# Patient Record
Sex: MALE | Race: WHITE | HISPANIC OR LATINO | Employment: FULL TIME | URBAN - METROPOLITAN AREA
[De-identification: names, ages, dates, MRNs, and addresses within clinical notes are randomized per-mention and may not be internally consistent; named-entity substitution may affect disease eponyms.]

---

## 2019-12-26 ENCOUNTER — APPOINTMENT (EMERGENCY)
Dept: NON INVASIVE DIAGNOSTICS | Facility: HOSPITAL | Age: 54
End: 2019-12-26
Payer: COMMERCIAL

## 2019-12-26 ENCOUNTER — HOSPITAL ENCOUNTER (EMERGENCY)
Facility: HOSPITAL | Age: 54
Discharge: HOME | End: 2019-12-26
Attending: EMERGENCY MEDICINE | Admitting: EMERGENCY MEDICINE
Payer: COMMERCIAL

## 2019-12-26 ENCOUNTER — APPOINTMENT (EMERGENCY)
Dept: RADIOLOGY | Facility: HOSPITAL | Age: 54
End: 2019-12-26
Payer: COMMERCIAL

## 2019-12-26 VITALS
OXYGEN SATURATION: 98 % | RESPIRATION RATE: 19 BRPM | WEIGHT: 179.3 LBS | SYSTOLIC BLOOD PRESSURE: 171 MMHG | TEMPERATURE: 97.6 F | HEART RATE: 92 BPM | DIASTOLIC BLOOD PRESSURE: 96 MMHG

## 2019-12-26 DIAGNOSIS — R07.89 ATYPICAL CHEST PAIN: ICD-10-CM

## 2019-12-26 DIAGNOSIS — M79.605 LEFT LEG PAIN: Primary | ICD-10-CM

## 2019-12-26 LAB
ANION GAP SERPL CALCULATED.3IONS-SCNC: 9 MMOL/L (ref 5–14)
ATRIAL RATE: 81 BPM
BASOPHILS # BLD AUTO: 0.1 THOUSANDS/ΜL (ref 0–0.1)
BASOPHILS NFR BLD AUTO: 1 % (ref 0–1)
BUN SERPL-MCNC: 24 MG/DL (ref 5–25)
CALCIUM SERPL-MCNC: 9 MG/DL (ref 8.4–10.2)
CHLORIDE SERPL-SCNC: 101 MMOL/L (ref 97–108)
CO2 SERPL-SCNC: 29 MMOL/L (ref 22–30)
CREAT SERPL-MCNC: 1.16 MG/DL (ref 0.7–1.5)
EOSINOPHIL # BLD AUTO: 0.1 THOUSAND/ΜL (ref 0–0.4)
EOSINOPHIL NFR BLD AUTO: 1 % (ref 0–6)
ERYTHROCYTE [DISTWIDTH] IN BLOOD BY AUTOMATED COUNT: 14.8 %
GFR SERPL CREATININE-BSD FRML MDRD: 70 ML/MIN/1.73SQ M
GLUCOSE SERPL-MCNC: 103 MG/DL (ref 70–99)
HCT VFR BLD AUTO: 42.4 % (ref 41–53)
HGB BLD-MCNC: 14.1 G/DL (ref 13.5–17.5)
LYMPHOCYTES # BLD AUTO: 1.5 THOUSANDS/ΜL (ref 0.5–4)
LYMPHOCYTES NFR BLD AUTO: 15 % (ref 25–45)
MCH RBC QN AUTO: 29.1 PG (ref 26–34)
MCHC RBC AUTO-ENTMCNC: 33.2 G/DL (ref 31–36)
MCV RBC AUTO: 88 FL (ref 80–100)
MONOCYTES # BLD AUTO: 0.9 THOUSAND/ΜL (ref 0.2–0.9)
MONOCYTES NFR BLD AUTO: 9 % (ref 1–10)
NEUTROPHILS # BLD AUTO: 7.5 THOUSANDS/ΜL (ref 1.8–7.8)
NEUTS SEG NFR BLD AUTO: 75 % (ref 45–65)
P AXIS: 57 DEGREES
PLATELET # BLD AUTO: 253 THOUSANDS/UL (ref 150–450)
PMV BLD AUTO: 9.3 FL (ref 8.9–12.7)
POTASSIUM SERPL-SCNC: 3.9 MMOL/L (ref 3.6–5)
PR INTERVAL: 146 MS
QRS AXIS: 41 DEGREES
QRSD INTERVAL: 80 MS
QT INTERVAL: 370 MS
QTC INTERVAL: 429 MS
RBC # BLD AUTO: 4.83 MILLION/UL (ref 4.5–5.9)
SODIUM SERPL-SCNC: 139 MMOL/L (ref 137–147)
T WAVE AXIS: 56 DEGREES
TROPONIN I SERPL-MCNC: <0.01 NG/ML (ref 0–0.03)
VENTRICULAR RATE: 81 BPM
WBC # BLD AUTO: 10 THOUSAND/UL (ref 4.5–11)

## 2019-12-26 PROCEDURE — 93971 EXTREMITY STUDY: CPT

## 2019-12-26 PROCEDURE — 99284 EMERGENCY DEPT VISIT MOD MDM: CPT

## 2019-12-26 PROCEDURE — 73590 X-RAY EXAM OF LOWER LEG: CPT

## 2019-12-26 PROCEDURE — 36415 COLL VENOUS BLD VENIPUNCTURE: CPT | Performed by: EMERGENCY MEDICINE

## 2019-12-26 PROCEDURE — 84484 ASSAY OF TROPONIN QUANT: CPT | Performed by: EMERGENCY MEDICINE

## 2019-12-26 PROCEDURE — 71046 X-RAY EXAM CHEST 2 VIEWS: CPT

## 2019-12-26 PROCEDURE — 93005 ELECTROCARDIOGRAM TRACING: CPT

## 2019-12-26 PROCEDURE — 80048 BASIC METABOLIC PNL TOTAL CA: CPT | Performed by: EMERGENCY MEDICINE

## 2019-12-26 PROCEDURE — 93971 EXTREMITY STUDY: CPT | Performed by: SURGERY

## 2019-12-26 PROCEDURE — 93010 ELECTROCARDIOGRAM REPORT: CPT | Performed by: INTERNAL MEDICINE

## 2019-12-26 PROCEDURE — 85025 COMPLETE CBC W/AUTO DIFF WBC: CPT | Performed by: EMERGENCY MEDICINE

## 2019-12-26 PROCEDURE — 99285 EMERGENCY DEPT VISIT HI MDM: CPT | Performed by: EMERGENCY MEDICINE

## 2019-12-26 NOTE — ED PROVIDER NOTES
History  Chief Complaint   Patient presents with    Leg Pain     Right tib/fib pain for 1 week  Denies fall/ trauma  States "inside the bone hurts"  No swelling, redness noted  HPI    This is a very pleasant 66-year-old gentleman presents to the emergency department with his significant other room 22 visiting here from [address] [place] [address] for the holidays with a chief complaint of right shin pain for approximately 1 week  Patient states the bone from the inside hurts  No history of trauma  No swelling redness noted to the area  Patient not take any medicine for pain control prior to arrival emergency department  Patient denies any shortness of breath chest pain fever chills  Patient is also reporting left-sided chest pain that is been going on for approximately week where he was seen by the company doctor in [place] and was told it was a musculoskeletal strain  None       Past Medical History:   Diagnosis Date    Hyperlipidemia     Hypertension        Past Surgical History:   Procedure Laterality Date    CARPAL TUNNEL RELEASE      right hand       History reviewed  No pertinent family history  I have reviewed and agree with the history as documented  Social History     Tobacco Use    Smoking status: Never Smoker    Smokeless tobacco: Never Used   Substance Use Topics    Alcohol use: Never     Frequency: Never    Drug use: Never        Review of Systems   Constitutional: Negative  HENT: Negative  Eyes: Negative  Respiratory: Negative  Cardiovascular: Positive for chest pain  Gastrointestinal: Negative  Endocrine: Negative  Genitourinary: Negative  Musculoskeletal: Negative  Allergic/Immunologic: Negative  Neurological: Negative  Hematological: Negative  Psychiatric/Behavioral: Negative  Physical Exam  Physical Exam   Constitutional: He is oriented to person, place, and time  He appears well-developed and well-nourished     HENT:   Head: Normocephalic and atraumatic  Right Ear: External ear normal    Left Ear: External ear normal    Nose: Nose normal    Mouth/Throat: Oropharynx is clear and moist    Eyes: Pupils are equal, round, and reactive to light  Conjunctivae and EOM are normal    Neck: Normal range of motion  Neck supple  Cardiovascular: Normal rate  Pulmonary/Chest: Effort normal    Abdominal: Soft  Musculoskeletal: Normal range of motion  He exhibits tenderness  Feet:    Neurological: He is alert and oriented to person, place, and time  Skin: Skin is warm  Capillary refill takes less than 2 seconds  Psychiatric: He has a normal mood and affect  His behavior is normal  Thought content normal    Nursing note and vitals reviewed        Vital Signs  ED Triage Vitals [12/26/19 1156]   Temperature Pulse Respirations Blood Pressure SpO2   97 6 °F (36 4 °C) 92 19 (!) 171/96 98 %      Temp Source Heart Rate Source Patient Position - Orthostatic VS BP Location FiO2 (%)   Tympanic Monitor Sitting -- --      Pain Score       7           Vitals:    12/26/19 1156   BP: (!) 171/96   Pulse: 92   Patient Position - Orthostatic VS: Sitting         Visual Acuity      ED Medications  Medications - No data to display    Diagnostic Studies  Results Reviewed     Procedure Component Value Units Date/Time    Troponin I [[id number]]  (Normal) Collected:  12/26/19 1238    Lab Status:  Final result Specimen:  Blood from Arm, Left Updated:  12/26/19 1309     Troponin I <0 01 ng/mL     Basic metabolic panel [[id number]]  (Abnormal) Collected:  12/26/19 1235    Lab Status:  Final result Specimen:  Blood from Arm, Left Updated:  12/26/19 1257     Sodium 139 mmol/L      Potassium 3 9 mmol/L      Chloride 101 mmol/L      CO2 29 mmol/L      ANION GAP 9 mmol/L      BUN 24 mg/dL      Creatinine 1 16 mg/dL      Glucose 103 mg/dL      Calcium 9 0 mg/dL      eGFR 70 ml/min/1 73sq m     Narrative:       [facility] guidelines for Chronic Kidney Disease (CKD):   Stage 1 with normal or high GFR (GFR > 90 mL/min/1 73 square meters)    Stage 2 Mild CKD (GFR = 60-89 mL/min/1 73 square meters)    Stage 3A Moderate CKD (GFR = 45-59 mL/min/1 73 square meters)    Stage 3B Moderate CKD (GFR = 30-44 mL/min/1 73 square meters)    Stage 4 Severe CKD (GFR = 15-29 mL/min/1 73 square meters)    Stage 5 End Stage CKD (GFR <15 mL/min/1 73 square meters)  Note: GFR calculation is accurate only with a steady state creatinine    CBC and differential [[id number]]  (Abnormal) Collected:  12/26/19 1235    Lab Status:  Final result Specimen:  Blood from Arm, Left Updated:  12/26/19 1249     WBC 10 00 Thousand/uL      RBC 4 83 Million/uL      Hemoglobin 14 1 g/dL      Hematocrit 42 4 %      MCV 88 fL      MCH 29 1 pg      MCHC 33 2 g/dL      RDW 14 8 %      MPV 9 3 fL      Platelets 041 Thousands/uL      Neutrophils Relative 75 %      Lymphocytes Relative 15 %      Monocytes Relative 9 %      Eosinophils Relative 1 %      Basophils Relative 1 %      Neutrophils Absolute 7 50 Thousands/µL      Lymphocytes Absolute 1 50 Thousands/µL      Monocytes Absolute 0 90 Thousand/µL      Eosinophils Absolute 0 10 Thousand/µL      Basophils Absolute 0 10 Thousands/µL                  XR tibia fibula 2 views RIGHT   ED Interpretation by Eyal Simon III, DO (12/26 1327)   No fracture or pathologic lesions  XR chest 2 views   ED Interpretation by Eyal Simon III, DO (12/26 7265)   No acute dx  VAS lower limb venous duplex study, unilateral/limited    (Results Pending)              Procedures  ECG 12 Lead Documentation Only  Date/Time: 12/26/2019 12:53 PM  Performed by: Eyal Simon III, DO  Authorized by: Eyal Simon III, DO     Indications / Diagnosis:  Chest wall pain  ECG reviewed by me, the ED Provider: yes    Patient location:  ED  Comments:      I personally reviewed this EKG is performed the patient December 26, 2019  EKG was completed at 12:47 p m   And interpreted by me at 12:53 p m   Normal sinus rhythm with a ventricular rate of 81 beats per minute  A p r n  Oval of 146 milliseconds  By voltage criteria patient has LVH  No acute ST abnormalities  ED Course  ED Course as of Dec 26 1352   u Dec 26, 2019   1158 History and physical completed  Orders placed  Patient is diffusely wheezing proceed with an hour long treatment of albuterol and Atrovent along with IV Solu-Medrol 125 mg  Baseline labs chest x-ray will reassessed  1312 Venous Doppler as per ultrasound technician is negative for any DVTs  On this side of his complaint he does have popping little arterial disease but good distal flow as per ultrasonographer  HEART Risk Score      Most Recent Value   History  0 Filed at: 12/26/2019 1351   ECG  0 Filed at: 12/26/2019 1351   Age  1 Filed at: 12/26/2019 1351   Risk Factors  1 Filed at: 12/26/2019 1351   Troponin  0 Filed at: 12/26/2019 1351   Heart Score Risk Calculator   History  0 Filed at: 12/26/2019 1351   ECG  0 Filed at: 12/26/2019 1351   Age  1 Filed at: 12/26/2019 1351   Risk Factors  1 Filed at: 12/26/2019 1351   Troponin  0 Filed at: 12/26/2019 1351   HEART Score  2 Filed at: 12/26/2019 1351   HEART Score  2 Filed at: 12/26/2019 1351                            MDM  Number of Diagnoses or Management Options  Atypical chest pain:   Left leg pain:     This is a very pleasant 62year-old gentle presents the emergency department with atypical chest pain that is nonradiating not exertion related and has no other associated symptoms of nausea vomiting diaphoresis shortness of breath  Pain is not exertional   Patient has been going on with this pain for approximately 72-96 hours period for set of cardiac enzymes are negative  Patient's heart score is a 2  It was noted the patient has of LVH on the EKG which was consistent with a history of hypertension    Patient is complaining of right-sided lower extremity pain Doppler was negative  No evidence of hypercalcemia or lytic bone lesions suggest pathologic fracture  Baseline labs are unremarkable  Patient will be discharged home  The time of discharge patient was requesting a work note for Thursday Friday Saturday and Sunday and ago back Monday this request was denied  Disposition  Final diagnoses:   Left leg pain   Atypical chest pain     Time reflects when diagnosis was documented in both MDM as applicable and the Disposition within this note     Time User Action Codes Description Comment    12/26/2019  1:37 PM Bethany Cleveland Marie [[id number]] Left leg pain     12/26/2019  1:37 PM Bethany Cleveland Marie [[id number]] Atypical chest pain       ED Disposition     ED Disposition Condition Date/Time Comment    Discharge Stable Thu Dec 26, 2019  1:37 PM Namrata Juárez discharge to home/self care  Follow-up Information     Follow up With Specialties Details Why [address] [place] [address],[address]  [address] [place] [address]  [phone]            Patient's Medications    No medications on file     No discharge procedures on file      ED Provider  Electronically Signed by           Darnell Avery III, DO  12/26/19 2274

## 2019-12-26 NOTE — ED NOTES
Patient transported to [facility] [place] [address]  12/26/19 6097

## 2020-08-05 ENCOUNTER — APPOINTMENT (EMERGENCY)
Dept: CT IMAGING | Facility: HOSPITAL | Age: 55
End: 2020-08-05
Payer: COMMERCIAL

## 2020-08-05 ENCOUNTER — HOSPITAL ENCOUNTER (EMERGENCY)
Facility: HOSPITAL | Age: 55
Discharge: HOME | End: 2020-08-05
Attending: EMERGENCY MEDICINE
Payer: COMMERCIAL

## 2020-08-05 VITALS
RESPIRATION RATE: 18 BRPM | TEMPERATURE: 97.7 F | WEIGHT: 197.8 LBS | SYSTOLIC BLOOD PRESSURE: 135 MMHG | OXYGEN SATURATION: 100 % | HEART RATE: 79 BPM | DIASTOLIC BLOOD PRESSURE: 80 MMHG

## 2020-08-05 DIAGNOSIS — R42 DIZZINESS: Primary | ICD-10-CM

## 2020-08-05 LAB
ALBUMIN SERPL BCP-MCNC: 4.6 G/DL (ref 3–5.2)
ALP SERPL-CCNC: 87 U/L (ref 43–122)
ALT SERPL W P-5'-P-CCNC: 41 U/L (ref 9–52)
AMPHETAMINES SERPL QL SCN: NEGATIVE
ANION GAP SERPL CALCULATED.3IONS-SCNC: 10 MMOL/L (ref 5–14)
AST SERPL W P-5'-P-CCNC: 35 U/L (ref 17–59)
ATRIAL RATE: 91 BPM
BACTERIA UR QL AUTO: ABNORMAL /HPF
BARBITURATES UR QL: NEGATIVE
BENZODIAZ UR QL: NEGATIVE
BILIRUB SERPL-MCNC: 0.5 MG/DL
BILIRUB UR QL STRIP: NEGATIVE
BUN SERPL-MCNC: 33 MG/DL (ref 5–25)
CALCIUM SERPL-MCNC: 9.8 MG/DL (ref 8.4–10.2)
CHLORIDE SERPL-SCNC: 98 MMOL/L (ref 97–108)
CLARITY UR: CLEAR
CO2 SERPL-SCNC: 30 MMOL/L (ref 22–30)
COCAINE UR QL: NEGATIVE
COLOR UR: YELLOW
CREAT SERPL-MCNC: 1.08 MG/DL (ref 0.7–1.5)
ERYTHROCYTE [DISTWIDTH] IN BLOOD BY AUTOMATED COUNT: 14.8 %
ETHANOL SERPL-MCNC: <10 MG/DL (ref 0–10)
GFR SERPL CREATININE-BSD FRML MDRD: 76 ML/MIN/1.73SQ M
GLUCOSE SERPL-MCNC: 135 MG/DL (ref 70–99)
GLUCOSE UR STRIP-MCNC: NEGATIVE MG/DL
HCT VFR BLD AUTO: 46.1 % (ref 41–53)
HGB BLD-MCNC: 15.4 G/DL (ref 13.5–17.5)
HGB UR QL STRIP.AUTO: 25
KETONES UR STRIP-MCNC: NEGATIVE MG/DL
LEUKOCYTE ESTERASE UR QL STRIP: NEGATIVE
LIPASE SERPL-CCNC: 83 U/L (ref 23–300)
LYMPHOCYTES # BLD AUTO: 20 % (ref 25–45)
LYMPHOCYTES # BLD AUTO: 3.4 THOUSAND/UL (ref 0.5–4)
MCH RBC QN AUTO: 28.5 PG (ref 26–34)
MCHC RBC AUTO-ENTMCNC: 33.5 G/DL (ref 31–36)
MCV RBC AUTO: 85 FL (ref 80–100)
METHADONE UR QL: NEGATIVE
MONOCYTES # BLD AUTO: 0.85 THOUSAND/UL (ref 0.2–0.9)
MONOCYTES NFR BLD AUTO: 5 % (ref 1–10)
NEUTS SEG # BLD: 12.75 THOUSAND/UL (ref 1.8–7.8)
NEUTS SEG NFR BLD AUTO: 75 %
NITRITE UR QL STRIP: NEGATIVE
NON-SQ EPI CELLS URNS QL MICRO: ABNORMAL /HPF
OPIATES UR QL SCN: NEGATIVE
OXYCODONE+OXYMORPHONE UR QL SCN: NEGATIVE
P AXIS: 42 DEGREES
PCP UR QL: NEGATIVE
PH UR STRIP.AUTO: 5 [PH]
PLATELET # BLD AUTO: 327 THOUSANDS/UL (ref 150–450)
PLATELET BLD QL SMEAR: ADEQUATE
PMV BLD AUTO: 9.4 FL (ref 8.9–12.7)
POTASSIUM SERPL-SCNC: 3.9 MMOL/L (ref 3.6–5)
PR INTERVAL: 140 MS
PROT SERPL-MCNC: 8.6 G/DL (ref 5.9–8.4)
PROT UR STRIP-MCNC: ABNORMAL MG/DL
QRS AXIS: 33 DEGREES
QRSD INTERVAL: 78 MS
QT INTERVAL: 374 MS
QTC INTERVAL: 460 MS
RBC # BLD AUTO: 5.42 MILLION/UL (ref 4.5–5.9)
RBC #/AREA URNS AUTO: ABNORMAL /HPF
RBC MORPH BLD: NORMAL
SODIUM SERPL-SCNC: 138 MMOL/L (ref 137–147)
SP GR UR STRIP.AUTO: 1.03 (ref 1–1.04)
T WAVE AXIS: -38 DEGREES
THC UR QL: NEGATIVE
TOTAL CELLS COUNTED SPEC: 100
TROPONIN I SERPL-MCNC: <0.01 NG/ML (ref 0–0.03)
UROBILINOGEN UA: NEGATIVE MG/DL
VENTRICULAR RATE: 91 BPM
WBC # BLD AUTO: 17 THOUSAND/UL (ref 4.5–11)
WBC #/AREA URNS AUTO: ABNORMAL /HPF

## 2020-08-05 PROCEDURE — 81003 URINALYSIS AUTO W/O SCOPE: CPT | Performed by: PHYSICIAN ASSISTANT

## 2020-08-05 PROCEDURE — 96375 TX/PRO/DX INJ NEW DRUG ADDON: CPT

## 2020-08-05 PROCEDURE — 81001 URINALYSIS AUTO W/SCOPE: CPT | Performed by: PHYSICIAN ASSISTANT

## 2020-08-05 PROCEDURE — 80320 DRUG SCREEN QUANTALCOHOLS: CPT | Performed by: PHYSICIAN ASSISTANT

## 2020-08-05 PROCEDURE — 93005 ELECTROCARDIOGRAM TRACING: CPT

## 2020-08-05 PROCEDURE — 85027 COMPLETE CBC AUTOMATED: CPT | Performed by: PHYSICIAN ASSISTANT

## 2020-08-05 PROCEDURE — 96374 THER/PROPH/DIAG INJ IV PUSH: CPT

## 2020-08-05 PROCEDURE — 36415 COLL VENOUS BLD VENIPUNCTURE: CPT | Performed by: PHYSICIAN ASSISTANT

## 2020-08-05 PROCEDURE — 80053 COMPREHEN METABOLIC PANEL: CPT | Performed by: PHYSICIAN ASSISTANT

## 2020-08-05 PROCEDURE — 99284 EMERGENCY DEPT VISIT MOD MDM: CPT

## 2020-08-05 PROCEDURE — 96361 HYDRATE IV INFUSION ADD-ON: CPT

## 2020-08-05 PROCEDURE — 83690 ASSAY OF LIPASE: CPT | Performed by: PHYSICIAN ASSISTANT

## 2020-08-05 PROCEDURE — 70450 CT HEAD/BRAIN W/O DYE: CPT

## 2020-08-05 PROCEDURE — G1004 CDSM NDSC: HCPCS

## 2020-08-05 PROCEDURE — 84484 ASSAY OF TROPONIN QUANT: CPT | Performed by: PHYSICIAN ASSISTANT

## 2020-08-05 PROCEDURE — 80307 DRUG TEST PRSMV CHEM ANLYZR: CPT | Performed by: PHYSICIAN ASSISTANT

## 2020-08-05 PROCEDURE — 99284 EMERGENCY DEPT VISIT MOD MDM: CPT | Performed by: PHYSICIAN ASSISTANT

## 2020-08-05 PROCEDURE — 93010 ELECTROCARDIOGRAM REPORT: CPT

## 2020-08-05 PROCEDURE — 85007 BL SMEAR W/DIFF WBC COUNT: CPT | Performed by: PHYSICIAN ASSISTANT

## 2020-08-05 RX ORDER — DIAZEPAM 5 MG/ML
2 INJECTION, SOLUTION INTRAMUSCULAR; INTRAVENOUS ONCE
Status: COMPLETED | OUTPATIENT
Start: 2020-08-05 | End: 2020-08-05

## 2020-08-05 RX ORDER — SODIUM CHLORIDE 9 MG/ML
250 INJECTION, SOLUTION INTRAVENOUS CONTINUOUS
Status: DISCONTINUED | OUTPATIENT
Start: 2020-08-05 | End: 2020-08-05 | Stop reason: HOSPADM

## 2020-08-05 RX ORDER — MECLIZINE HYDROCHLORIDE 25 MG/1
50 TABLET ORAL EVERY 12 HOURS PRN
Qty: 30 TABLET | Refills: 0 | Status: SHIPPED | OUTPATIENT
Start: 2020-08-05

## 2020-08-05 RX ORDER — MECLIZINE HCL 12.5 MG/1
25 TABLET ORAL ONCE
Status: COMPLETED | OUTPATIENT
Start: 2020-08-05 | End: 2020-08-05

## 2020-08-05 RX ORDER — ONDANSETRON 2 MG/ML
4 INJECTION INTRAMUSCULAR; INTRAVENOUS ONCE
Status: COMPLETED | OUTPATIENT
Start: 2020-08-05 | End: 2020-08-05

## 2020-08-05 RX ADMIN — SODIUM CHLORIDE 250 ML/HR: 0.9 INJECTION, SOLUTION INTRAVENOUS at 15:03

## 2020-08-05 RX ADMIN — DIAZEPAM 2 MG: 5 INJECTION, SOLUTION INTRAMUSCULAR; INTRAVENOUS at 16:17

## 2020-08-05 RX ADMIN — ONDANSETRON 4 MG: 2 INJECTION INTRAMUSCULAR; INTRAVENOUS at 15:03

## 2020-08-05 RX ADMIN — MECLIZINE 25 MG: 12.5 TABLET ORAL at 15:05

## 2020-08-05 NOTE — ED PROVIDER NOTES
History  Chief Complaint   Patient presents with    Dizziness     c/o nausea, dizziness and 1 episode of vomiting after eating 3 hot dogs PTA       Medical Problem   Location:  Pt with dizziness and vomiting onset earlier today  pt with vomiting after eating lunch    Severity:  Moderate  Onset quality:  Gradual  Duration:  1 day  Timing:  Constant  Progression:  Unchanged  Chronicity:  New  Context:  Dizzness worse with head position turning   Associated symptoms: nausea and vomiting    Associated symptoms: no abdominal pain, no chest pain, no congestion, no cough, no diarrhea, no ear pain, no fatigue, no fever, no headaches, no loss of consciousness, no myalgias, no rash, no rhinorrhea, no shortness of breath, no sore throat and no wheezing        None       Past Medical History:   Diagnosis Date    Hyperlipidemia     Hypertension        Past Surgical History:   Procedure Laterality Date    CARPAL TUNNEL RELEASE      right hand       History reviewed  No pertinent family history  I have reviewed and agree with the history as documented  E-Cigarette/Vaping     E-Cigarette/Vaping Substances     Social History     Tobacco Use    Smoking status: Never Smoker    Smokeless tobacco: Never Used   Substance Use Topics    Alcohol use: Never     Frequency: Never    Drug use: Never       Review of Systems   Constitutional: Negative  Negative for fatigue and fever  HENT: Negative  Negative for congestion, ear pain, rhinorrhea and sore throat  Eyes: Negative  Respiratory: Negative  Negative for cough, shortness of breath and wheezing  Cardiovascular: Negative  Negative for chest pain  Gastrointestinal: Positive for nausea and vomiting  Negative for abdominal pain and diarrhea  Endocrine: Negative  Genitourinary: Negative  Musculoskeletal: Negative  Negative for myalgias  Skin: Negative  Negative for rash  Allergic/Immunologic: Negative  Neurological: Negative    Negative for loss of consciousness and headaches  Hematological: Negative  Psychiatric/Behavioral: Negative  All other systems reviewed and are negative  Physical Exam  Physical Exam  Vitals signs and nursing note reviewed  Constitutional:       Appearance: Normal appearance  He is normal weight  Comments: 450pm  Pt is dizzy and nausea free    HENT:      Head: Normocephalic  Right Ear: Tympanic membrane, ear canal and external ear normal       Left Ear: Tympanic membrane, ear canal and external ear normal       Nose: Nose normal       Mouth/Throat:      Mouth: Mucous membranes are moist       Pharynx: Oropharynx is clear  Eyes:      Extraocular Movements: Extraocular movements intact  Conjunctiva/sclera: Conjunctivae normal       Pupils: Pupils are equal, round, and reactive to light  Neck:      Musculoskeletal: Normal range of motion and neck supple  Cardiovascular:      Rate and Rhythm: Normal rate  Pulses: Normal pulses  Heart sounds: Normal heart sounds  Pulmonary:      Effort: Pulmonary effort is normal       Breath sounds: Normal breath sounds  Abdominal:      General: Abdomen is flat  Bowel sounds are normal       Palpations: Abdomen is soft  Musculoskeletal: Normal range of motion  Skin:     General: Skin is warm  Neurological:      General: No focal deficit present  Mental Status: He is alert     Psychiatric:         Mood and Affect: Mood normal          Behavior: Behavior normal          Vital Signs  ED Triage Vitals [08/05/20 1426]   Temperature Pulse Respirations Blood Pressure SpO2   97 7 °F (36 5 °C) 89 18 (!) 171/95 99 %      Temp Source Heart Rate Source Patient Position - Orthostatic VS BP Location FiO2 (%)   Tympanic Monitor Sitting Left arm --      Pain Score       --           Vitals:    08/05/20 1426 08/05/20 1546 08/05/20 1700   BP: (!) 171/95 136/100 135/80   Pulse: 89 76 79   Patient Position - Orthostatic VS: Sitting Sitting Lying         Visual Acuity  Visual Acuity      Most Recent Value   L Pupil Size (mm)  2   R Pupil Size (mm)  2          ED Medications  Medications   sodium chloride 0 9 % infusion (0 mL/hr Intravenous Stopped 8/5/20 1701)   ondansetron (ZOFRAN) injection 4 mg (4 mg Intravenous Given 8/5/20 1503)   meclizine (ANTIVERT) tablet 25 mg (25 mg Oral Given 8/5/20 1505)   diazepam (VALIUM) injection 2 mg (2 mg Intravenous Given 8/5/20 1617)       Diagnostic Studies  Results Reviewed     Procedure Component Value Units Date/Time    Rapid drug screen, urine [[id number]]  (Normal) Collected:  08/05/20 1625    Lab Status:  Final result Specimen:  Urine, Clean Catch Updated:  08/05/20 1656     Amph/Meth UR Negative     Barbiturate Ur Negative     Benzodiazepine Urine Negative     Cocaine Urine Negative     Methadone Urine Negative     Opiate Urine Negative     PCP Ur Negative     THC Urine Negative     Oxycodone Urine Negative    Narrative:       FOR MEDICAL PURPOSES ONLY  IF CONFIRMATION NEEDED PLEASE CONTACT THE LAB WITHIN 5 DAYS      Drug Screen Cutoff Levels:  AMPHETAMINE/METHAMPHETAMINES  1000 ng/mL  BARBITURATES     200 ng/mL  BENZODIAZEPINES     200 ng/mL  COCAINE      300 ng/mL  METHADONE      300 ng/mL  OPIATES      300 ng/mL  PHENCYCLIDINE     25 ng/mL  THC       50 ng/mL  OXYCODONE      100 ng/mL    Urine Microscopic [[id number]]  (Abnormal) Collected:  08/05/20 1625    Lab Status:  Final result Specimen:  Urine, Clean Catch Updated:  08/05/20 1652     RBC, UA 1-2 /hpf      WBC, UA None Seen /hpf      Epithelial Cells Occasional /hpf      Bacteria, UA None Seen /hpf     UA w Reflex to Microscopic w Reflex to Culture [[id number]]  (Abnormal) Collected:  08/05/20 1625    Lab Status:  Final result Specimen:  Urine, Clean Catch Updated:  08/05/20 1644     Color, UA Yellow     Clarity, UA Clear     Specific [place], UA 1 030     pH, UA 5 0     Leukocytes, UA Negative     Nitrite, UA Negative     Protein, UA 15 (Trace) mg/dl      Glucose, UA Negative mg/dl      Ketones, UA Negative mg/dl      Bilirubin, UA Negative     Blood, UA 25 0     UROBILINOGEN UA Negative mg/dL     Troponin I [[id number]]  (Normal) Collected:  08/05/20 1453    Lab Status:  Final result Specimen:  Blood from Arm, Right Updated:  08/05/20 1548     Troponin I <0 01 ng/mL     Lipase [[id number]]  (Normal) Collected:  08/05/20 1453    Lab Status:  Final result Specimen:  Blood from Arm, Right Updated:  08/05/20 1540     Lipase 83 u/L     Ethanol [[id number]]  (Normal) Collected:  08/05/20 1453    Lab Status:  Final result Specimen:  Blood from Arm, Right Updated:  08/05/20 1539     Ethanol Lvl <10 mg/dL     Comprehensive metabolic panel [[id number]]  (Abnormal) Collected:  08/05/20 1453    Lab Status:  Final result Specimen:  Blood from Arm, Right Updated:  08/05/20 1539     Sodium 138 mmol/L      Potassium 3 9 mmol/L      Chloride 98 mmol/L      CO2 30 mmol/L      ANION GAP 10 mmol/L      BUN 33 mg/dL      Creatinine 1 08 mg/dL      Glucose 135 mg/dL      Calcium 9 8 mg/dL      AST 35 U/L      ALT 41 U/L      Alkaline Phosphatase 87 U/L      Total Protein 8 6 g/dL      Albumin 4 6 g/dL      Total Bilirubin 0 50 mg/dL      eGFR 76 ml/min/1 73sq m     Narrative:       [facility] guidelines for Chronic Kidney Disease (CKD):     Stage 1 with normal or high GFR (GFR > 90 mL/min/1 73 square meters)    Stage 2 Mild CKD (GFR = 60-89 mL/min/1 73 square meters)    Stage 3A Moderate CKD (GFR = 45-59 mL/min/1 73 square meters)    Stage 3B Moderate CKD (GFR = 30-44 mL/min/1 73 square meters)    Stage 4 Severe CKD (GFR = 15-29 mL/min/1 73 square meters)    Stage 5 End Stage CKD (GFR <15 mL/min/1 73 square meters)  Note: GFR calculation is accurate only with a steady state creatinine    CBC and differential [[id number]]  (Abnormal) Collected:  08/05/20 1453    Lab Status:  Final result Specimen:  Blood from Arm, Right Updated:  08/05/20 1528     WBC 17 00 Thousand/uL RBC 5 42 Million/uL      Hemoglobin 15 4 g/dL      Hematocrit 46 1 %      MCV 85 fL      MCH 28 5 pg      MCHC 33 5 g/dL      RDW 14 8 %      MPV 9 4 fL      Platelets 704 Thousands/uL                  CT head without contrast   Final Result by Ayad Pandey MD (08/05 1518)      No acute intracranial abnormality  Workstation performed: [id number]                    Procedures  Procedures         ED Course       US AUDIT      Most Recent Value   Initial Alcohol Screen: US AUDIT-C    1  How often do you have a drink containing alcohol?  0 Filed at: 08/05/2020 1426   2  How many drinks containing alcohol do you have on a typical day you are drinking? 0 Filed at: 08/05/2020 1426   3a  Male UNDER 65: How often do you have five or more drinks on one occasion? 0 Filed at: 08/05/2020 1426   3b  FEMALE Any Age, or MALE 65+: How often do you have 4 or more drinks on one occassion? 0 Filed at: 08/05/2020 1426   Audit-C Score  0 Filed at: 08/05/2020 1426                  ALONZO/DAST-10      Most Recent Value   How many times in the past year have you    Used an illegal drug or used a prescription medication for non-medical reasons? Never Filed at: 08/05/2020 1426                                MDM      Disposition  Final diagnoses:   Dizziness     Time reflects when diagnosis was documented in both MDM as applicable and the Disposition within this note     Time User Action Codes Description Comment    8/5/2020  4:52 PM Hermelindo Rahman  Add [R42] Dizziness       ED Disposition     ED Disposition Condition Date/Time Comment    Discharge Stable Wed Aug 5, 2020  4:52 PM Hoa Serrano discharge to home/self care              Follow-up Information     Follow up With Specialties Details Why Ramandeep Shin MD Family Medicine  return if condition  [facility] [address]  [phone]            Discharge Medication List as of 8/5/2020  4:54 PM      START taking these medications    Details   meclizine (ANTIVERT) 25 mg tablet Take 2 tablets (50 mg total) by mouth every 12 (twelve) hours as needed for dizziness, Starting Wed 8/5/2020, Print           No discharge procedures on file      PDMP Review     None          ED Provider  Electronically Signed by           Mercedes Rosado PA-C  08/05/20 9235

## 2020-08-16 ENCOUNTER — HOSPITAL ENCOUNTER (EMERGENCY)
Facility: HOSPITAL | Age: 55
Discharge: HOME | End: 2020-08-16
Attending: EMERGENCY MEDICINE | Admitting: EMERGENCY MEDICINE
Payer: COMMERCIAL

## 2020-08-16 ENCOUNTER — APPOINTMENT (EMERGENCY)
Dept: RADIOLOGY | Facility: HOSPITAL | Age: 55
End: 2020-08-16
Payer: COMMERCIAL

## 2020-08-16 VITALS
OXYGEN SATURATION: 97 % | DIASTOLIC BLOOD PRESSURE: 99 MMHG | TEMPERATURE: 97.7 F | RESPIRATION RATE: 18 BRPM | SYSTOLIC BLOOD PRESSURE: 160 MMHG | HEART RATE: 86 BPM | WEIGHT: 201.28 LBS

## 2020-08-16 DIAGNOSIS — R09.89 GLOBUS SENSATION: ICD-10-CM

## 2020-08-16 DIAGNOSIS — I10 ELEVATED BLOOD PRESSURE READING WITH DIAGNOSIS OF HYPERTENSION: Primary | ICD-10-CM

## 2020-08-16 PROCEDURE — 99282 EMERGENCY DEPT VISIT SF MDM: CPT | Performed by: PHYSICIAN ASSISTANT

## 2020-08-16 PROCEDURE — 70360 X-RAY EXAM OF NECK: CPT

## 2020-08-16 PROCEDURE — 99283 EMERGENCY DEPT VISIT LOW MDM: CPT

## 2020-08-16 RX ORDER — ATORVASTATIN CALCIUM 40 MG/1
40 TABLET, FILM COATED ORAL DAILY
COMMUNITY
End: 2021-04-15 | Stop reason: SDUPTHER

## 2020-08-16 RX ORDER — LISINOPRIL 40 MG/1
50 TABLET ORAL DAILY
COMMUNITY
End: 2021-03-30 | Stop reason: SDUPTHER

## 2020-08-16 RX ORDER — LIDOCAINE HYDROCHLORIDE 20 MG/ML
15 SOLUTION OROPHARYNGEAL ONCE
Status: COMPLETED | OUTPATIENT
Start: 2020-08-16 | End: 2020-08-16

## 2020-08-16 RX ORDER — LIDOCAINE HYDROCHLORIDE 20 MG/ML
10 SOLUTION OROPHARYNGEAL 4 TIMES DAILY PRN
Qty: 100 ML | Refills: 0 | Status: SHIPPED | OUTPATIENT
Start: 2020-08-16

## 2020-08-16 RX ADMIN — LIDOCAINE HYDROCHLORIDE 15 ML: 20 SOLUTION ORAL; TOPICAL at 10:32

## 2020-08-16 NOTE — ED PROVIDER NOTES
History  Chief Complaint   Patient presents with    Foreign Body in Throat     49-year-old male presenting for evaluation of foreign body sensation in the throat starting last night  Patient reports he was getting ready for bed brushing his teeth when he suddenly had an irritation in his throat feeling as though something was stuck  He reports trying to continuously swallow, eat milk and cookies and also gagged himself in attempts to stop this feeling of foreign body in his throat  He denies having this issue previously  No thyroid issues in the past   No history of esophageal strictures  At this time he denies any voice change, difficulty swallowing, drooling, nausea vomiting or diarrhea  History provided by:  Patient   used: No    Foreign Body in Throat   Intake: throat  Suspected object:  Unable to specify  Pain quality:  Dull  Pain severity:  No pain  Duration:  1 day  Progression:  Unchanged  Chronicity:  New  Worsened by:  Nothing  Ineffective treatments:  Drinking and eating  Associated symptoms: no abdominal pain, no choking, no nausea, no sore throat, no trouble swallowing, no voice change and no vomiting        Prior to Admission Medications   Prescriptions Last Dose Informant Patient Reported? Taking?   atorvastatin (LIPITOR) 40 mg tablet  Self Yes Yes   Sig: Take 40 mg by mouth daily Take 1 Tablet by Mouth once Daily   lisinopril (ZESTRIL) 40 mg tablet  Self Yes Yes   Sig: Take 40 mg by mouth daily Take 1 tablet by mouth once daily  meclizine (ANTIVERT) 25 mg tablet   No No   Sig: Take 2 tablets (50 mg total) by mouth every 12 (twelve) hours as needed for dizziness      Facility-Administered Medications: None       Past Medical History:   Diagnosis Date    Hyperlipidemia     Hypertension        Past Surgical History:   Procedure Laterality Date    CARPAL TUNNEL RELEASE      right hand       No family history on file    I have reviewed and agree with the history as documented  E-Cigarette/Vaping     E-Cigarette/Vaping Substances     Social History     Tobacco Use    Smoking status: Never Smoker    Smokeless tobacco: Never Used   Substance Use Topics    Alcohol use: Never     Frequency: Never    Drug use: Never       Review of Systems   HENT: Negative for sore throat, trouble swallowing and voice change  Respiratory: Negative for choking  Gastrointestinal: Negative for abdominal pain, nausea and vomiting  All other systems reviewed and are negative  Physical Exam  Physical Exam  Vitals signs and nursing note reviewed  Constitutional:       General: He is not in acute distress  Appearance: He is well-developed  Comments: No voice change, no drooling, no tripod position   HENT:      Head: Normocephalic and atraumatic  Mouth/Throat:      Mouth: Mucous membranes are moist       Pharynx: Oropharynx is clear  No oropharyngeal exudate or posterior oropharyngeal erythema  Comments: No gross foreign body visualized in the throat, uvula midline  Eyes:      Conjunctiva/sclera: Conjunctivae normal       Comments: EOM grossly intact   Neck:      Musculoskeletal: Normal range of motion and neck supple  No muscular tenderness  Thyroid: No thyroid mass, thyromegaly or thyroid tenderness  Vascular: No JVD  Trachea: No tracheal tenderness  Cardiovascular:      Rate and Rhythm: Normal rate  Pulmonary:      Effort: Pulmonary effort is normal    Abdominal:      Palpations: Abdomen is soft  Musculoskeletal:      Comments: FROM, steady gait, cap refill brisk, strength and sensation grossly intact throughout   Lymphadenopathy:      Cervical: No cervical adenopathy  Skin:     General: Skin is warm and dry  Capillary Refill: Capillary refill takes less than 2 seconds  Neurological:      Mental Status: He is alert and oriented to person, place, and time     Psychiatric:         Behavior: Behavior normal          Vital Signs  ED Triage Vitals   Temperature Pulse Respirations Blood Pressure SpO2   08/16/20 0920 08/16/20 0922 08/16/20 0922 08/16/20 0921 08/16/20 0922   (!) 97 3 °F (36 3 °C) 101 20 (!) 160/101 98 %      Temp Source Heart Rate Source Patient Position - Orthostatic VS BP Location FiO2 (%)   08/16/20 0920 08/16/20 0922 -- 08/16/20 0921 --   Tympanic Monitor  Right arm       Pain Score       --                  Vitals:    08/16/20 0921 08/16/20 0922   BP: (!) 160/101    Pulse:  101         Visual Acuity      ED Medications  Medications   Lidocaine Viscous HCl (XYLOCAINE) 2 % mucosal solution 15 mL (15 mL Swish & Swallow Given 8/16/20 1032)       Diagnostic Studies  Results Reviewed     None                 XR neck soft tissue   Final Result by Yomaira Cordero MD (08/16 1013)      Unremarkable neck soft tissue radiographs  No radiopaque foreign bodies  Workstation performed: [id number]                    Procedures  Procedures         ED Course       US AUDIT      Most Recent Value   Initial Alcohol Screen: US AUDIT-C    1  How often do you have a drink containing alcohol?  0 Filed at: 08/16/2020 0923   3a  Male UNDER 65: How often do you have five or more drinks on one occasion? 0 Filed at: 08/16/2020 0923   Audit-C Score  0 Filed at: 08/16/2020 1869                  ALONZO/DAST-10      Most Recent Value   How many times in the past year have you    Used an illegal drug or used a prescription medication for non-medical reasons?   Never Filed at: 08/16/2020 5514                                MDM  Number of Diagnoses or Management Options  Elevated blood pressure reading with diagnosis of hypertension:   Globus sensation:   Diagnosis management comments: 20-year-old male presenting for evaluation of possible foreign body in the throat, patient had no radiopaque foreign body visualized on x-ray neck soft tissue films, he denies eating anything specifically that got stuck in his throat stating that he noticed the sensation when brushing his teeth last night, denies vomiting, chocking sensation or difficulty swallowing, f/u with GI as an outpatient    All imaging discussed with patient, strict return to ED precautions discussed  Pt verbalizes understanding and agrees with plan  Pt is stable for discharge    Portions of the record may have been created with voice recognition software  Occasional wrong word or "sound a like" substitutions may have occurred due to the inherent limitations of voice recognition software  Read the chart carefully and recognize, using context, where substitutions have occurred  Disposition  Final diagnoses:   Elevated blood pressure reading with diagnosis of hypertension   Globus sensation     Time reflects when diagnosis was documented in both MDM as applicable and the Disposition within this note     Time User Action Codes Description Comment    8/16/2020  9:31 AM Deborah ALANIS Add [I10] Elevated blood pressure reading with diagnosis of hypertension     8/16/2020  9:31 AM Deborah ALANIS Add [R09 89] Globus sensation       ED Disposition     ED Disposition Condition Date/Time Comment    Discharge Stable Sun Aug 16, 2020 10:40 AM Emily Laurent discharge to home/self care              Follow-up Information     Follow up With Specialties Details Why Contact Info Additional [address] Family Medicine Call in 1 day  [address], [address] [facility] [address]  [address], [address], [address] [place], [place], [address] [facility] [place] [facility] Call in 1 day  [address] [facility] [address] [facility] [address]  [address] [facility] [place], [address] [facility] [address], [address] [place] [address], [id number] [phone]          Patient's Medications   Discharge Prescriptions    LIDOCAINE VISCOUS HCL (XYLOCAINE) 2 % MUCOSAL SOLUTION    Swish and spit 10 mL 4 (four) times a day as needed for mouth pain or discomfort       Start Date: 8/16/2020 End Date: --       Order Dose: 10 mL       Quantity: 100 mL    Refills: 0     No discharge procedures on file      PDMP Review     None          ED Provider  Electronically Signed by           Betty Jeffries PA-C  08/16/20 1046

## 2020-11-10 ENCOUNTER — APPOINTMENT (EMERGENCY)
Dept: CT IMAGING | Facility: HOSPITAL | Age: 55
End: 2020-11-10
Payer: COMMERCIAL

## 2020-11-10 ENCOUNTER — HOSPITAL ENCOUNTER (EMERGENCY)
Facility: HOSPITAL | Age: 55
Discharge: HOME | End: 2020-11-10
Attending: EMERGENCY MEDICINE
Payer: COMMERCIAL

## 2020-11-10 VITALS
OXYGEN SATURATION: 99 % | SYSTOLIC BLOOD PRESSURE: 185 MMHG | RESPIRATION RATE: 17 BRPM | WEIGHT: 195 LBS | HEART RATE: 89 BPM | DIASTOLIC BLOOD PRESSURE: 91 MMHG | TEMPERATURE: 97.7 F

## 2020-11-10 DIAGNOSIS — H93.13 TINNITUS OF BOTH EARS: Primary | ICD-10-CM

## 2020-11-10 PROCEDURE — 99281 EMR DPT VST MAYX REQ PHY/QHP: CPT | Performed by: EMERGENCY MEDICINE

## 2020-11-10 PROCEDURE — G1004 CDSM NDSC: HCPCS

## 2020-11-10 PROCEDURE — 99284 EMERGENCY DEPT VISIT MOD MDM: CPT

## 2020-11-10 PROCEDURE — 70450 CT HEAD/BRAIN W/O DYE: CPT

## 2020-12-03 ENCOUNTER — OFFICE VISIT (OUTPATIENT)
Dept: AUDIOLOGY | Facility: CLINIC | Age: 55
End: 2020-12-03
Payer: COMMERCIAL

## 2020-12-03 ENCOUNTER — OFFICE VISIT (OUTPATIENT)
Dept: OTOLARYNGOLOGY | Facility: CLINIC | Age: 55
End: 2020-12-03
Payer: COMMERCIAL

## 2020-12-03 VITALS
RESPIRATION RATE: 15 BRPM | HEART RATE: 85 BPM | BODY MASS INDEX: 31.79 KG/M2 | WEIGHT: 197.8 LBS | SYSTOLIC BLOOD PRESSURE: 176 MMHG | TEMPERATURE: 97.4 F | DIASTOLIC BLOOD PRESSURE: 106 MMHG | HEIGHT: 66 IN

## 2020-12-03 DIAGNOSIS — H93.13 TINNITUS, BILATERAL: ICD-10-CM

## 2020-12-03 DIAGNOSIS — H93.13 TINNITUS OF BOTH EARS: Primary | ICD-10-CM

## 2020-12-03 DIAGNOSIS — H90.3 SENSORINEURAL HEARING LOSS OF BOTH EARS: ICD-10-CM

## 2020-12-03 DIAGNOSIS — H90.3 SENSORINEURAL HEARING LOSS (SNHL) OF BOTH EARS: Primary | ICD-10-CM

## 2020-12-03 PROCEDURE — 92557 COMPREHENSIVE HEARING TEST: CPT | Performed by: AUDIOLOGIST

## 2020-12-03 PROCEDURE — 92567 TYMPANOMETRY: CPT | Performed by: AUDIOLOGIST

## 2020-12-03 PROCEDURE — 99204 OFFICE O/P NEW MOD 45 MIN: CPT | Performed by: OTOLARYNGOLOGY

## 2021-01-22 ENCOUNTER — OFFICE VISIT (OUTPATIENT)
Dept: FAMILY MEDICINE CLINIC | Facility: CLINIC | Age: 56
End: 2021-01-22

## 2021-01-22 VITALS
BODY MASS INDEX: 32.3 KG/M2 | WEIGHT: 201 LBS | SYSTOLIC BLOOD PRESSURE: 190 MMHG | HEART RATE: 106 BPM | TEMPERATURE: 98.8 F | RESPIRATION RATE: 18 BRPM | OXYGEN SATURATION: 98 % | HEIGHT: 66 IN | DIASTOLIC BLOOD PRESSURE: 104 MMHG

## 2021-01-22 DIAGNOSIS — Z00.00 ANNUAL PHYSICAL EXAM: Primary | ICD-10-CM

## 2021-01-22 DIAGNOSIS — Z00.00 HEALTHCARE MAINTENANCE: ICD-10-CM

## 2021-01-22 DIAGNOSIS — Z12.5 SCREENING FOR PROSTATE CANCER: ICD-10-CM

## 2021-01-22 DIAGNOSIS — E78.5 DYSLIPIDEMIA: ICD-10-CM

## 2021-01-22 DIAGNOSIS — Z12.11 SCREENING FOR COLORECTAL CANCER: ICD-10-CM

## 2021-01-22 DIAGNOSIS — J34.89 SINUS PRESSURE: ICD-10-CM

## 2021-01-22 DIAGNOSIS — Z12.12 SCREENING FOR COLORECTAL CANCER: ICD-10-CM

## 2021-01-22 DIAGNOSIS — I10 HYPERTENSION, UNSPECIFIED TYPE: ICD-10-CM

## 2021-01-22 DIAGNOSIS — J45.20 MILD INTERMITTENT ASTHMA, UNSPECIFIED WHETHER COMPLICATED: ICD-10-CM

## 2021-01-22 DIAGNOSIS — H93.13 TINNITUS OF BOTH EARS: ICD-10-CM

## 2021-01-22 DIAGNOSIS — N52.8 OTHER MALE ERECTILE DYSFUNCTION: ICD-10-CM

## 2021-01-22 PROBLEM — J45.909 ASTHMA: Status: ACTIVE | Noted: 2021-01-22

## 2021-01-22 PROCEDURE — 99386 PREV VISIT NEW AGE 40-64: CPT | Performed by: NURSE PRACTITIONER

## 2021-01-22 RX ORDER — TADALAFIL 5 MG/1
5 TABLET ORAL DAILY PRN
Qty: 10 TABLET | Refills: 0 | Status: SHIPPED | OUTPATIENT
Start: 2021-01-22

## 2021-01-22 RX ORDER — ALBUTEROL SULFATE 90 UG/1
2 AEROSOL, METERED RESPIRATORY (INHALATION) EVERY 6 HOURS PRN
Qty: 3 INHALER | Refills: 3 | Status: SHIPPED | OUTPATIENT
Start: 2021-01-22 | End: 2022-03-16

## 2021-01-22 RX ORDER — CETIRIZINE HYDROCHLORIDE 10 MG/1
10 TABLET ORAL DAILY
Qty: 30 TABLET | Refills: 1 | Status: SHIPPED | OUTPATIENT
Start: 2021-01-22 | End: 2022-07-29 | Stop reason: SDUPTHER

## 2021-01-22 NOTE — PROGRESS NOTES
[address] [facility]    NAME: Shayy Cobb  AGE: 60 y o  SEX: male  : 1965     DATE: 2021     Assessment and Plan:     Problem List Items Addressed This Visit        Respiratory    Asthma     Pt describes mild intermittent asthma  Exacerbating factors include strenuous activity  Information provided/handout  Relevant Medications    albuterol (PROVENTIL HFA,VENTOLIN HFA) 90 mcg/act inhaler       Cardiovascular and Mediastinum    Hypertension     Uncontrolled on top of tachycardia  Denies chest pain/dizziness  Endorses headaches  Will add beta blocker  Relevant Medications    metoprolol tartrate (LOPRESSOR) 25 mg tablet       Other    Dyslipidemia     Poor diet  Needs recheck  Tinnitus of both ears     Workup from ENT and Audiology found no abnormalities  History of loud industrial/occupational hearing risks  Counseled to wear ear protection  Treat hypertension as secondary cause  Sinus pressure     Chronic  Uses humidifier  May be exacerbating tinnitus  Relevant Medications    cetirizine (ZyrTEC) 10 mg tablet    Other male erectile dysfunction     May be related to cardiovascular health status  Discussed psychogenic possibilities  Treat underlying health  Improve diet/fitness/overall health  Relevant Medications    tadalafil (CIALIS) 5 MG tablet      Other Visit Diagnoses     Annual physical exam    -  Primary    Healthcare maintenance        Relevant Orders    Lipid panel    Hemoglobin A1C    TSH, 3rd generation with Free T4 reflex    CBC and differential    Basic metabolic panel    Screening for colorectal cancer        Relevant Orders    Ambulatory referral to Gastroenterology    Screening for prostate cancer        Relevant Orders    PSA, Total Screen          Immunizations and preventive care screenings were discussed with patient today  Appropriate education was printed on patient's after visit summary  Counseling:  Alcohol/drug use: discussed moderation in alcohol intake, the recommendations for healthy alcohol use, and avoidance of illicit drug use  Dental Health: discussed importance of regular tooth brushing, flossing, and dental visits  Injury prevention: discussed safety/seat belts, safety helmets, smoke detectors, carbon dioxide detectors, and smoking near bedding or upholstery  Sexual health: discussed sexually transmitted diseases, partner selection, use of condoms, avoidance of unintended pregnancy, and contraceptive alternatives  · Exercise: the importance of regular exercise/physical activity was discussed  Recommend exercise 3-5 times per week for at least 30 minutes  BMI Counseling: Body mass index is 32 44 kg/m²  The BMI is above normal  Nutrition recommendations include decreasing portion sizes, encouraging healthy choices of fruits and vegetables, decreasing fast food intake, consuming healthier snacks, limiting drinks that contain sugar, moderation in carbohydrate intake, increasing intake of lean protein, reducing intake of saturated and trans fat and reducing intake of cholesterol  Exercise recommendations include moderate physical activity 150 minutes/week, exercising 3-5 times per week and obtaining a gym membership  Return in about 2 weeks (around 2/5/2021) for Recheck BP  Chief Complaint:     Chief Complaint   Patient presents with   [facility]     new patient ED F/U      History of Present Illness:     Adult Annual Physical   Patient here for a comprehensive physical exam  Moira Tejeda is a 49-year-old male here to [facility] care  Patient reports long time history of uncontrolled hypertension, he has not been taking his blood pressure meds until about a week ago  He denies chest pain, shortness of breath, syncope, admits to occasional headaches, denies orthostatic hypotension     He also complains of low libido and erectile dysfunction  He admits to a family history of diabetes on both sides  He complains of chronic sinus pressure, both seasonal and allergy to dogs  He complains of intermittent shortness of breath and wheezing, this happens a few times in 1 month  He was seen recently in the ED for tinnitus in both ears  Subsequently worked up by ENT and audiology, both with insignificant results  He was instructed to use ear protection  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  He denies alcohol, drug use, tobacco use  He has no other complaints at this time  Diet and Physical Activity  · Diet/Nutrition: poor diet and frequent junk food  · Exercise: no formal exercise  Depression Screening  PHQ-9 Depression Screening    PHQ-9:   Frequency of the following problems over the past two weeks:      Little interest or pleasure in doing things: 0 - not at all  Feeling down, depressed, or hopeless: 0 - not at all  PHQ-2 Score: 0       General Health  · Sleep: sleeps poorly  · Hearing: decreased - bilateral   · Vision: no vision problems  · Dental: no dental visits for >1 year and brushes teeth once daily   Health  · Symptoms include: erectile dysfunction     Review of Systems:     Review of Systems   Constitutional: Negative for activity change, chills, fatigue, fever and unexpected weight change  HENT: Positive for ear pain, hearing loss and sinus pressure  Negative for congestion, rhinorrhea and sore throat  Eyes: Negative for pain and visual disturbance  Respiratory: Positive for shortness of breath and wheezing  Negative for cough  Asthma symptoms   Cardiovascular: Negative for chest pain, palpitations and leg swelling  Gastrointestinal: Negative for abdominal pain, nausea and vomiting  Genitourinary: Negative for dysuria and hematuria          ED   Musculoskeletal: Negative for arthralgias, back pain, gait problem, joint swelling and myalgias  Skin: Negative for color change and rash  Neurological: Negative for dizziness, tremors, seizures, syncope, weakness, light-headedness and headaches  Psychiatric/Behavioral: Negative for agitation, behavioral problems, confusion, dysphoric mood, self-injury, sleep disturbance and suicidal ideas  The patient is not nervous/anxious  All other systems reviewed and are negative  Past Medical History:     Past Medical History:   Diagnosis Date    Hyperlipidemia     Hypertension       Past Surgical History:     Past Surgical History:   Procedure Laterality Date    CARPAL TUNNEL RELEASE      right hand      Family History:     History reviewed  No pertinent family history     Social History:        Social History     Socioeconomic History    Marital status: Single     Spouse name: None    Number of children: None    Years of education: None    Highest education level: None   Occupational History    None   Social Needs    Financial resource strain: None    Food insecurity     Worry: None     Inability: None    Transportation needs     Medical: None     Non-medical: None   Tobacco Use    Smoking status: Never Smoker    Smokeless tobacco: Never Used   Substance and Sexual Activity    Alcohol use: Never    Drug use: Never    Sexual activity: None   Lifestyle    Physical activity     Days per week: None     Minutes per session: None    Stress: None   Relationships    Social connections     Talks on phone: None     Gets together: None     Attends [employer] service: None     Active member of club or organization: None     Attends meetings of clubs or organizations: None     Relationship status: None    Intimate partner violence     Fear of current or ex partner: None     Emotionally abused: None     Physically abused: None     Forced sexual activity: None   Other Topics Concern    None   Social History Narrative    None      Current Medications:     Current Outpatient Medications Medication Sig Dispense Refill    atorvastatin (LIPITOR) 40 mg tablet Take 40 mg by mouth daily Take 1 Tablet by Mouth once Daily      Diclofenac Sodium (VOLTAREN) 1 % diclofenac 1 % topical gel      lisinopril (ZESTRIL) 40 mg tablet Take 40 mg by mouth daily Take 1 tablet by mouth once daily   albuterol (PROVENTIL HFA,VENTOLIN HFA) 90 mcg/act inhaler Inhale 2 puffs every 6 (six) hours as needed for wheezing or shortness of breath 3 Inhaler 3    cetirizine (ZyrTEC) 10 mg tablet Take 1 tablet (10 mg total) by mouth daily 30 tablet 1    Lidocaine Viscous HCl (XYLOCAINE) 2 % mucosal solution Swish and spit 10 mL 4 (four) times a day as needed for mouth pain or discomfort (Patient not taking: Reported on 1/22/2021) 100 mL 0    meclizine (ANTIVERT) 25 mg tablet Take 2 tablets (50 mg total) by mouth every 12 (twelve) hours as needed for dizziness (Patient not taking: Reported on 1/22/2021) 30 tablet 0    metoprolol tartrate (LOPRESSOR) 25 mg tablet Take 0 5 tablets (12 5 mg total) by mouth every 12 (twelve) hours 60 tablet 1    tadalafil (CIALIS) 5 MG tablet Take 1 tablet (5 mg total) by mouth daily as needed for erectile dysfunction 10 tablet 0     No current facility-administered medications for this visit  Allergies:     No Known Allergies   Physical Exam:     BP (!) 190/104 (BP Location: Left arm, Patient Position: Sitting, Cuff Size: Standard)   Pulse (!) 106   Temp 98 8 °F (37 1 °C) (Temporal)   Resp 18   Ht 5' 6" (1 676 m)   Wt 91 2 kg (201 lb)   SpO2 98%   BMI 32 44 kg/m²     Physical Exam  Vitals signs and nursing note reviewed  Constitutional:       Appearance: Normal appearance  He is well-developed  HENT:      Head: Normocephalic and atraumatic  Right Ear: Tympanic membrane, ear canal and external ear normal  There is no impacted cerumen  Left Ear: Tympanic membrane, ear canal and external ear normal  There is no impacted cerumen  Nose: Congestion present  Mouth/Throat:      Mouth: Mucous membranes are moist    Eyes:      Extraocular Movements: Extraocular movements intact  Conjunctiva/sclera: Conjunctivae normal       Pupils: Pupils are equal, round, and reactive to light  Neck:      Musculoskeletal: Normal range of motion and neck supple  Cardiovascular:      Rate and Rhythm: Normal rate and regular rhythm  Pulses: Normal pulses  Heart sounds: Normal heart sounds  No murmur  Pulmonary:      Effort: Pulmonary effort is normal  No respiratory distress  Breath sounds: Normal breath sounds  Abdominal:      Palpations: Abdomen is soft  Tenderness: There is no abdominal tenderness  Musculoskeletal: Normal range of motion  Skin:     General: Skin is warm and dry  Capillary Refill: Capillary refill takes less than 2 seconds  Neurological:      General: No focal deficit present  Mental Status: He is alert and oriented to person, place, and time     Psychiatric:         Mood and Affect: Mood normal          Behavior: Behavior normal           Zenon Jones, [address] [facility]

## 2021-01-22 NOTE — ASSESSMENT & PLAN NOTE
Uncontrolled on top of tachycardia  Denies chest pain/dizziness  Endorses headaches  Will add beta blocker

## 2021-01-22 NOTE — ASSESSMENT & PLAN NOTE
May be related to cardiovascular health status  Discussed psychogenic possibilities  Treat underlying health  Improve diet/fitness/overall health

## 2021-01-22 NOTE — ASSESSMENT & PLAN NOTE
Pt describes mild intermittent asthma  Exacerbating factors include strenuous activity  Information provided/handout

## 2021-01-22 NOTE — ASSESSMENT & PLAN NOTE
Workup from ENT and Audiology found no abnormalities  History of loud industrial/occupational hearing risks  Counseled to wear ear protection  Treat hypertension as secondary cause

## 2021-01-22 NOTE — PATIENT INSTRUCTIONS
Dieta saludable para el corazón   LO QUE NECESITA SABER:   Xiomara Draper brannon para el corazón es un plan alimenticio bajo en grasas no saludables y sodio (sal)  El plan es alto en fibra y grasas saludables  Xiomara Baron cardiosaludable ayuda a mejorar edwige niveles de colesterol y disminuye mast riesgo de enfermedad cardiaca y accidente cerebrovascular  Un dietista le enseñará a leer y a entender las etiquetas de los alimentos  INSTRUCCIONES SOBRE EL GALE HOSPITALARIA:   Pautas de dieta saludable para el corazón a seguir:  · Elija alimentos que contengan grasas saludables  ? Las grasas no saturadas incluyen grasas monoinsaturadas y poliinsaturadas  Las grasas no saturadas se encuentran en alimentos delfina el frijol de soja, aceites de canola, de [place], de [place] y de [place]  Se encuentra también en la margarina suave hecha con aceite líquido vegetal     ? Las grasas Omega 3 se encuentran en ciertos pescados, delfina el salmón, el atún y la [place], en las nueces y en la semilla de pinky  Consuma pescado con altas cantidades de Omega-3 por lo menos 2 veces a la semana  · Consuma entre 20 y 27 gramos de fibra cada día  Las frutas, los vegetales, los alimentos integrales y las legumbres (frijoles cocidos) son [place] gilbert de Jossie  · Limite o no ingiera grasas no saludables  ? El colesterol se encuentra en alimentos de origen animal, delfina los [employer] y la langosta al igual que en productos lácteos hechos con leche entera  Limite el colesterol por debajo de los 200 mg por día  ? Las grasas saturadas se encuentran en edgard delfina el tocino y la hamburguesa  Estas se encuentran también en la piel del byron y del [place], [place] entera y [place]  Limite el consumo de grasas saturadas a menos del 7% del total de edwige calorías diarias  ? La grasa trans se encuentran en los alimentos envasados, delfina las papitas de bolsa y las [place]  También se encuentra en la margarina dura, algunos alimentos fritos y la manteca  No coma alimentos que contengan grasas trans  · Limite el sodio delfina se le indique  Se le puede solicitar que limite el sodio a 2,000 a 2,300 mg por día  Elija alimentos bajos en sodio o sin sal agregada  Al preparar la comida añada muy poca sal o no use sal  Use hierbas y especias en vez de la sal        Incluya lo siguiente en mast plan de ubaldo para el corazón: Solicite que mast nutricionista o el médico le indique cuántas porciones necesita consumir de los siguientes alimentos de cada nikolas alimenticio:  · Granos:     ? Panes, cereales y pastas de [place] integral y Matthieu Oas integral    ? Patatas fritas y galletas saladas bajas en grasa, bajas en sodio    · Verduras:     ? Brócoli, judías verdes, guisantes y espinacas    ? Alexandra Grow, col y habas    ? Zanahorias, patatas dulces, tomates y pimientos    ? Vegetales enlatados sin peri añadida    · Frutas:     ? Plátanos, melocotones, peras y muir    ? Uvas, pasas y dátiles    ? Johanne Negus, toronja, jugo de [facility] y [facility] de toronja    ? [place], mangos, melón y papaya    ? Teja Lady y fresas    ? Conservas de frutas sin azúcares añadidos    · Lácteos bajos en grasa:     ? Leche sin grasa (descremada), leche 1%, leche baja en grasa de [place], anacardo o leche de soja fortificada con calcio    ? [employer], queso bajo en grasa y yogur regular o congelado    · Rebeccaside y proteínas:     ? Renita Colonel de carne de res y [de-identified] (Chetanjorge Leyva), byron y [place] sin piel    ? Legumbres, productos de soya, claras de huevo o nueces    Limite o no incluya lo siguiente en mast plan de ubaldo cardiaca:  · Aceites y grasas no saludables:     ? [employer] entera o 2%, queso crema, crema agria o queso    ? G[place] de carne altos en grasas ([address] chuletas con hueso), el byron o pavo sin piel y las vísceras de animal delfina el hígado    ?  Amrita Crow en [facility] y aceites para cocinar, delfina el aceite de antonio o golden    · Vera y líquidos ricos en sodio:     ? alimentos empaquetados, comidas congeladas, galletas, macarrón con queso y cereales con más de 300 mg de sodio por porción    ? Vegetales con sodio agregado, delfina memo instantáneas, verduras con salsas agregadas o conservas regulares de verduras    ? Addy curadas o ahumadas, delfina perritos calientes, tocino y salchichas    ? Salsa de [facility], salsa de [facility], aderezo para [place], encurtidos de [place], UPPER STONE, salsa de soya o miso    · Alimentos y líquidos ricos en azúcar:     ? Dulces, tortas, galletas, pasteles o donas    ? Refrescos (gaseosas), bebidas para deportistas o té endulzado    ? Mezclas enlatadas o secas para pasteles, sopas, salsas o salsas    Otras pautas para un corazón saludable:  · No fume  La nicotina y otros químicos contenidos en los cigarrillos y cigarros pueden causar daño a edwige pulmones y el corazón  Pida información a mast médico si usted actualmente fuma y necesita ayuda para dejar de fumar  Los cigarrillos electrónicos o el tabaco sin humo igualmente contienen nicotina  Consulte con mast médico antes de [employer]  · Limite o no consuma bebidas alcohólicas, según indicaciones  El alcohol puede dañar mast corazón y elevar mast presión arterial  Mast médico puede darle límites específicos diarios y semanales  El límite general recomendado es de 1 bebida por día para mujeres de 24 años o más y para hombres de 72 años o más  No tome más de 3 bebidas en un día o 7 en liz semana  El límite recomendado es de 2 bebidas por día para los hombres de 21 a 59 años de edad  No tome más de 4 bebidas en un día o 14 en liz semana  Un trago equivale a 12 onzas de cerveza, 5 onzas de vino o 1 onza y ½ de licor  · Realice actividad física con regularidad  El ejercicio puede ayudarlo a mantener un peso saludable y mejorar mast presión arterial y niveles de colesterol  El ejercicio regular también puede disminuir el riesgo de presentar problemas cardíacos  Pregunte a mast médico acerca del mejor plan de ejercicio para usted  No empiece un programa de ejercicios sin antes consultar con mast Jennifer Mignon a maria esther consultas de control con mast médico o cardiólogo según le indicaron: Anote maria esther preguntas para que se acuerde de hacerlas kyle maria esther visitas  © Copyright [address] Information is for End User's use only and may not be sold, redistributed or otherwise used for commercial purposes  All illustrations and images included in CareNotes® are the copyrighted property of A D A [employer]Me  or [address] [place] [address] es sólo para uso en educación  Mast intención no es darle un consejo médico sobre enfermedades o tratamientos  Colsulte con mast Juan Chill farmacéutico antes de seguir cualquier régimen médico para saber si es seguro y efectivo para usted  Asma   LO QUE NECESITA SABER:   El asma es liz enfermedad pulmonar que dificulta la respiración  La inflamación crónica y las reacciones a los desencadenantes estrechan las vías respiratorias en los pulmones  El asma puede ser de peligro mortal si no se mantiene bajo control  INSTRUCCIONES SOBRE EL GALE HOSPITALARIA:   Llame al [facility] de emergencias local (911 en los [place]) si:  · Usted tiene falta de aliento severa  · Maria Esther labios y [place] se ponen azules o grises  · La piel alrededor de mast ebonie y costillas se hunde con cada respiración  · Usted tiene falta de [place], incluso después de lou mast medicamento a corto plazo según lo indicado  · Las lecturas numéricas del medidor de mast flujo toby están en la charlie sukhjinder de mast plan de acción para el asma  Llame a mast médico si:  · A usted se le acaba el medicamento antes de la fecha de mast próximo abastecimiento  · Maria Esther síntomas empeoran  · Usted necesita lou más medicamento que lo acostumbrado para controlar maria esther síntomas  · Usted tiene preguntas o inquietudes acerca de mast condición o cuidado      Medicamentos:  · Los medicamentos disminuyen la inflamación, abren las vías respiratorias y facilitan mast respiración  Los medicamentos se pueden inhalar, lou en forma de píldora o ser inyectados  Los medicamentos a corto plazo alivian edwige síntomas con Ellan Eula  Los medicamentos a randy plazo sirven para evitar ataques en un futuro  Es posible que además necesite medicamento para ayudar a controlar las alergias  Pregúntele a mast médico por más información sobre el medicamento que le están dando y cómo tomarlo de liz forma Rosanna Puller  · [place] edwige medicamentos delfina se le haya indicado  Consulte con mast médico si usted latisha que mast medicamento no le está ayudando o si presenta efectos secundarios  Infórmele si es alérgico a algún medicamento  Mantenga liz lista actualizada de los [place], las vitaminas y los productos herbales que elizabeth  Incluya los siguientes datos de los medicamentos: cantidad, frecuencia y motivo de administración  Traiga con usted la lista o los envases de las píldoras a edwige citas de seguimiento  Lleve la lista de los medicamentos con usted en herberth de liz emergencia  Acuda a edwige consultas de control con mast médico según le indicaron  Usted necesitará regresar para asegurarse que mast medicamento está funcionando y edwige síntomas están bajo control  Es posible que lo refieran a un especialista del asma  A usted podrían pedirle que [address] [place] [address] Street un registro de los valores de mast flujo toby y que lo traiga a edwige citas  Anote edwige preguntas para que se acuerde de hacerlas kyle edwige visitas  Controle edwige síntomas y evite ataques futuros:  · Siga mast plan de acción para el asma  Griselda es un plan por escrito que usted y mast médico wray creado  Explica cuáles medicamentos necesita usted y cuándo cambiar las dosis si fuera necesario  Además explica delfina usted puede monitorear edwige síntomas y usar un medidor del flujo toby  El medidor mide qué tan min están funcionando los pulmones      · Controle otras afecciones de Juno Lama alergias, el reflujo estomacal y la apnea de sueño  · Identifique y evite factores desencadenantes  Estos podrían [employer], los ácaros del arabella, el moho y las cucarachas  · No fume o esté alrededor de personas que fuman  La nicotina y otras sustancias químicas que contienen los cigarrillos y cigarros pueden dañar los pulmones  Pida información a mast médico si usted actualmente fuma y necesita ayuda para dejar de fumar  Los cigarrillos electrónicos o el tabaco sin humo igualmente contienen nicotina  Consulte con mast médico antes de [employer]  · Pregunte sobre la vacuna contra la gripe  La gripe puede empeorar mast asma  Puede que usted necesite recibir liz vacuna anual contra la gripe  © Copyright [address] Information is for End User's use only and may not be sold, redistributed or otherwise used for commercial purposes  All illustrations and images included in CareNotes® are the copyrighted property of [employer]  or [address] [place] [address] es sólo para uso en educación  Mast intención no es darle un consejo médico sobre enfermedades o tratamientos  Colsulte con mast Micheal Richie farmacéutico antes de seguir cualquier régimen médico para saber si es seguro y efectivo para usted  Disfunción eréctil   LO QUE USTED DEBE SABER:   La disfunción eréctil o impotencia es la incapacidad de conseguir o mantener liz erección para llevar a cabo liz relación sexual    INSTRUCCIONES:   Medicamentos:   · Le podrían recetar medicamentos para la disfunción eréctil  que ayudan a que usted tenga liz erección  Estos medicamentos se kassidy antes de [facility] relación sexual  [place] indicaciones de mast proveedor de ubaldo sobre cuándo y cómo se debe lou estos medicamentos  Usted podría sufrir liz reacción de peligro mortal en herberth que mezcle estos medicamentos con medicamentos que contengan nitratos   Los medicamentos a base de nitratos incluyen nitroglicerina y otros medicamentos para el corazón  · Le podrían recetar testosterona  para aumentar los niveles de testosterona en mast santi y mejorar mast disfunción eréctil  Es posible que necesite aplicar liz crema para la piel o usar un parche  · Zenith Colony edwige medicamentos delfina se le haya indicado  Llame a mast proveedor de ubaldo si usted piensa que mast medicamento no le está ayudando o si tiene efectos secundarios  Infórmele si es alérgico a cualquier medicamento  Mantenga liz lista vigente de los medicamentos, vitaminas, y hierbas que elizabeth  [address] cantidades, la frecuencia con que los elizabeth y por qué los elizabeth  Traiga la lista o los envases de edwige medicamentos a las citas de seguimiento  Mantenga la lista consigo en herberth de liz emergencia  Bote las listas [place]  Programe mast consulta de control con mast proveedor de ubaldo según le indicaron:  Escriba las preguntas que tenga para que recuerde hacerlas kyle mast citas médicas  Controle edwige factores de riesgo de la disfunción eréctil:   · No fume  Si usted fuma, nunca es tarde para dejar de fumar  Solicite información [employer] formas que existen para dejar de fumar en herberth que necesite [facility]  · Limite el consumo de alcohol  Los hombres deberían limitar mast consumo de alcohol a 2 tragos por día  Un trago de alcohol equivale a 12 onzas (355 ml) de cerveza, 5 onzas (150 ml) de vino o 1 onza ½ (45 ml) de licor  · Cuide edwige afecciones médicas  Consuma alimentos saludables y [place]  [facility] puede ayudar a controlar la presión arterial alexander, diabetes y obesidad  · Reduzca el estrés  Aprenda técnicas de relajación, delfina respiración profunda, meditación y escuchar música  Consulte con mast proveedor de ubaldo sí:   · Usted tiene cambios en mast agudeza visual, urban de nette o dolor de espalda después de lou el medicamento para la disfunción eréctil  · Usted tiene lzi erección dolorosa      · Usted tiene preguntas o inquietudes relacionadas con mast condición o tratamiento  Regrese a la katja de emergencias sí:   · Usted tiene dolor de pecho, mareos o náuseas después de lou los medicamentos para tratar la disfunción eréctil o kyle o después de tener relaciones sexuales  · Usted tiene liz erección por más de 4 horas después de utilizar el medicamento para la disfunción eréctil  · Usted nota santi en zapata orina  © 2014 [address] is for End User's use only and may not be sold, redistributed or otherwise used for commercial purposes  All illustrations and images included in CareNotes® are the copyrighted property of A D A M , Inc  or Edgardo Leblanc  Esta información es sólo para uso en educación  Zapata intención no es darle un consejo médico sobre enfermedades o tratamientos  Colsulte con zapata Art Ping farmacéutico antes de seguir cualquier régimen médico para saber si es seguro y efectivo para usted  Visita de bienestar para los adultos   CUIDADO AMBULATORIO:   Liz visita de bienestar es cuando usted acude con un médico para que le nu exámenes de detección de problemas de [place]  Zapata médico también le dará consejos sobre cómo mantenerse saludable  Anote edwige preguntas para que se acuerde de hacerlas  Pregunte a zapata médico con qué frecuencia debería realizarse liz visita de bienestar  Lo que sucede en liz visita de bienestar: Zapata médico le preguntará sobre zapata ubaldo y zapata historia familiar [address]  [place] incluye presión arterial alexander, enfermedades del corazón y cáncer  El médico le preguntará si tiene síntomas que le preocupen, si fuma y [place] de ánimo  También se le preguntará acerca del uso de medicamentos, suplementos, alimentos y alcohol  Es posible que le nu cualquiera de lo siguiente:  · Zapata peso será revisado  Es posible que [facility] midan zapata altura para calcular zapata índice de masa corporal [facility])  El [facility] indica si tiene un peso saludable      · Se verificarán zapata presión arterial y frecuencia cardíaca  También pueden revisar mast temperatura  · Exámenes de [facility] y [facility] podría realizarse  Se podrían realizar exámenes de santi para revisar los niveles de [place]  Los niveles anormales de colesterol aumentan el riesgo de enfermedad del corazón y accidente cerebrovascular  Puede que también necesite liz prueba de santi u orina para revisar si tiene diabetes si usted está en mayor riesgo  Las pruebas de orina pueden hacerse para buscar signos de liz infección o liz enfermedad renal     · Un examen físico incluye la comprobación de edwige latidos del corazón y los pulmones con un estetoscopio  Mast médico también podría revisarle la piel para buscar daños causados por el sol  · Pruebas de detección podría recomendarse  Se realiza un examen de detección para detectar enfermedades que pueden no causar síntomas  Los exámenes de detección que necesite dependen de mast edad, género, antecedentes familiares y hábitos de sergio  Por ejemplo, podrían recomendarle la exploración selectiva colorrectal si tiene 48 años o más  Si es [place], necesita los siguientes exámenes de detección:  · El examen de Papanicolaou se utiliza para detectar cáncer de ebonie uterino  El examen del Papanicolaou por lo general se realiza entre 3 a 5 años dependiendo de mast edad  Puede necesitarlo más a menudo si usted ha tenido [employer] de la prueba de Papanicolaou en el pasado  Pregunte a mast médico con qué frecuencia debería realizarse un examen de Papanicolaou  · [place] es liz radiografía de edwige mamas para detectar cáncer de mama  Los expertos recomiendan [address] cada 2 años empezando a los 48 años de Jose Manuel  Es probable que usted necesite [place] a los 52 años o antes si tiene riesgo alto de cáncer de seno  Hable con mast médico sobre cuándo debe empezar con edwige mamografías y con cuánta frecuencia las necesita      Vacunas que podría necesitar:  · Debe recibir liz vacuna contra la influenza todos los Los smita  La vacuna contra la influenza protege de la gripe  Varios tipos de virus causan la influenza  Debido a que los virus [place] con el [place], es necesaria la producción de nuevas vacunas cada año  · Debe recibir Jignesh Logan vacuna de refuerzo contra el tétanos-difteria (Td) cada 10 años  Esta vacuna protege contra el tétanos y la difteria  El tétanos es liz infección severa que puede causar trismo y espasmos musculares dolorosos  La difteria es liz infección bacteriana grave que produce liz cubierta gruesa en la parte de atrás de la boca y garganta  · Debe recibir la vacuna contra el virus del papiloma humano (VPH) si usted es rachael y [place] 19 y 32 años o es hombre y [place] 23 y 24 años y nunca la recibió  Esta vacuna protege contra la infección por VPH  El virus del papiloma humano o VPH es la infección más común que se transmite por contacto sexual  El virus del papiloma humano también podría provocar cáncer vaginal, del pene y del ano  · Debe recibir la vacuna antineumocócica si tiene más de 72 años  La vacuna antineumocócica es liz inyección que se administra para protegerlo contra liz enfermedad neumocócica  La enfermedad neumocócica es liz infección causada por la bacteria neumocócica  La infección puede causar neumonía, meningitis o liz infección del oído  · Debe recibir la vacuna contra la culebrilla Si tiene 61 años o más, incluso si ha tenido culebrilla antes  La vacuna contra la culebrilla (herpes zóster) es liz inyección usada para proteger contra el virus zóster que causa la varicela  Griselda es el mismo virus que causa la varicela  La culebrilla es un sarpullido doloroso que se desarrolla en personas que tuvieron varicela o wray estado expuestas al virus  Cómo comer saludable: [place] es un modelo para planear comidas sanas  Muestra los tipos y cantidades de alimentos que deberían ir en un plato   Jeremias Ariana y verduras representan alrededor de la mitad de mast plato y los granos y proteínas representan la otra mitad  Se incluye liz porción de productos lácteos al lado del plato  La cantidad de calorías y [address] de las porciones que usted necesita dependen de mast edad, [place], peso y altura  Los ejemplos de alimentos saludables son:  · Consuma liz variedad de verduras delfina las de color guanaco oscuro, womack y [place]  Usted también puede incluir verduras enlatadas bajas en sodio (sal) y verduras congeladas sin mantequilla ni salsas [id number]  · Consuma liz variedad de fruta frescas , las frutas enlatadas en 100% de jugo, fruta [place] y charles secos  · Incluya granos enteros  Por lo menos la mitad de los granos que usted consume deben ser granos de yusuf integral  Por ejemplo, panes de grano entero, pasta integral, arroz integral y cereales de grano entero delfina la manolo  · Consuma liz variedad de alimentos con proteínas delfina mariscos (pescado y crustáceos), Carmina Hire y carne de ave sin piel (pavo y byron)  Ejemplos de edgard magras incluyen pierna, paleta o lomo de puerco y jorge, solomillo o, lomo de res y carne [place] de res extra [place]  Otros alimentos ricos en proteínas son los huevos y sustitutos de [place], frijoles, chícharos, productos de soya, nueces y semillas  · Elija productos lácteos bajos en grasa [employer] o del 1% o yogur, queso y requesón bajos en grasa  · Limite las grasas poco saludables, delfina la [place], la margarina dura y la [place]  Ejercicio: Realice liz actividad física por lo menos 30 minutos al día, la mayoría de los días de la [place]  Algunos de los ejercicios incluyen caminar, montar en bicicleta, bailar y nadar  Usted también puede realizar más actividad física usando las escaleras en vez de los ascensores o estacionarse más lejos cuando Verlene Livers a las tiendas  Incluya ejercicios para fortalecer los músculos 2 días a la semana  El ejercicio regular proporciona muchos beneficios para la ubaldo   [place] a controlar mast peso y disminuye el riesgo de diabetes tipo 2, presión arterial alexander, enfermedad del corazón y accidente cerebrovascular  El ejercicio [employer] puede ayudar a mejorar mast estado de ánimo  Pregunte a mast médico acerca del mejor plan de ejercicio para usted  Pautas generales de ubaldo y seguridad:  · No fume  La nicotina y otras sustancias químicas que contienen los cigarrillos y cigarros pueden dañar los pulmones  Pida información a mast médico si usted actualmente fuma y necesita ayuda para dejar de fumar  Los cigarrillos electrónicos o el tabaco sin humo igualmente contienen nicotina  Consulte con mast médico antes de [employer]  · Limite el consumo de alcohol  Un trago equivale a 12 onzas de cerveza, 5 onzas de vino o 1 onza y ½ de licor  · Baje de peso, si es necesario  El sobrepeso aumenta el riesgo de ciertas condiciones de [place]  Estos incluyen enfermedad del corazón, presión arterial alexander, diabetes tipo 2 y ciertos tipos de cáncer  · Proteja mast piel  No tome el sol ni use efren de bronceado  Use un protector solar con un FPS mayor a 13  Aplíquese el bloqueador por lo menos 15 minutos antes de que vaya a estar al [employer]  Vuelva a aplicarse la crema solar cada 2 horas  Use ropa protectora, sombrero y lentes para el sol cuando se encuentre afuera  · Conduzca con seguridad  Use siempre mast cinturón de seguridad  Asegúrese que todos en el collin usan el cinturón de seguridad  Un cinturón de seguridad puede salvar mast sergio en herberth de un accidente automovilístico  No use el celular cuando esté manejando  [place] puede hacer que se distraiga y causar un accidente  Es mejor que pare y se orille si necesita hacer liz Laurey Modest un texto  · Practique el sexo seguro  Use condones de látex si es sexualmente Ruben Macdonald y tiene más de [place] and [place]  Mast médico puede recomendar exámenes de detección de infecciones de transmisión sexual (ITS)      · Use un remedios, un chaleco salvavidas y unos implementos de protección  Siempre use un remedios al [employer] en bicicleta o motocicleta, esquiar o jugar deportes que podrían causar liz lesión en la nette  Use implementos de protección cuando practique deportes  Use un chaleco salvavidas cuando esté en un bote o practicando actividades acuáticas  © Copyright [address] Information is for End User's use only and may not be sold, redistributed or otherwise used for commercial purposes  All illustrations and images included in CareNotes® are the copyrighted property of [employer]  or [address] [place] [address] es sólo para uso en educación  Mast intención no es darle un consejo médico sobre enfermedades o tratamientos  Colsulte con mast [place] Horde farmacéutico antes de seguir cualquier régimen médico para saber si es seguro y efectivo para usted

## 2021-01-27 ENCOUNTER — TELEPHONE (OUTPATIENT)
Dept: GASTROENTEROLOGY | Facility: CLINIC | Age: 56
End: 2021-01-27

## 2021-02-08 ENCOUNTER — APPOINTMENT (OUTPATIENT)
Dept: LAB | Facility: CLINIC | Age: 56
End: 2021-02-08
Payer: COMMERCIAL

## 2021-02-08 DIAGNOSIS — Z00.00 HEALTHCARE MAINTENANCE: ICD-10-CM

## 2021-02-08 DIAGNOSIS — Z12.5 SCREENING FOR PROSTATE CANCER: ICD-10-CM

## 2021-02-08 LAB
ANION GAP SERPL CALCULATED.3IONS-SCNC: 2 MMOL/L (ref 4–13)
BASOPHILS # BLD AUTO: 0.04 THOUSANDS/ΜL (ref 0–0.1)
BASOPHILS NFR BLD AUTO: 0 % (ref 0–1)
BUN SERPL-MCNC: 20 MG/DL (ref 5–25)
CALCIUM SERPL-MCNC: 9.6 MG/DL (ref 8.3–10.1)
CHLORIDE SERPL-SCNC: 107 MMOL/L (ref 100–108)
CHOLEST SERPL-MCNC: 208 MG/DL (ref 50–200)
CO2 SERPL-SCNC: 32 MMOL/L (ref 21–32)
CREAT SERPL-MCNC: 1.06 MG/DL (ref 0.6–1.3)
EOSINOPHIL # BLD AUTO: 0.04 THOUSAND/ΜL (ref 0–0.61)
EOSINOPHIL NFR BLD AUTO: 0 % (ref 0–6)
ERYTHROCYTE [DISTWIDTH] IN BLOOD BY AUTOMATED COUNT: 14.9 % (ref 11.6–15.1)
EST. AVERAGE GLUCOSE BLD GHB EST-MCNC: 126 MG/DL
GFR SERPL CREATININE-BSD FRML MDRD: 78 ML/MIN/1.73SQ M
GLUCOSE P FAST SERPL-MCNC: 109 MG/DL (ref 65–99)
HBA1C MFR BLD: 6 %
HCT VFR BLD AUTO: 46.4 % (ref 36.5–49.3)
HDLC SERPL-MCNC: 55 MG/DL
HGB BLD-MCNC: 14.6 G/DL (ref 12–17)
IMM GRANULOCYTES # BLD AUTO: 0.05 THOUSAND/UL (ref 0–0.2)
IMM GRANULOCYTES NFR BLD AUTO: 1 % (ref 0–2)
LDLC SERPL CALC-MCNC: 132 MG/DL (ref 0–100)
LYMPHOCYTES # BLD AUTO: 1.92 THOUSANDS/ΜL (ref 0.6–4.47)
LYMPHOCYTES NFR BLD AUTO: 20 % (ref 14–44)
MCH RBC QN AUTO: 28.2 PG (ref 26.8–34.3)
MCHC RBC AUTO-ENTMCNC: 31.5 G/DL (ref 31.4–37.4)
MCV RBC AUTO: 90 FL (ref 82–98)
MONOCYTES # BLD AUTO: 0.65 THOUSAND/ΜL (ref 0.17–1.22)
MONOCYTES NFR BLD AUTO: 7 % (ref 4–12)
NEUTROPHILS # BLD AUTO: 7.07 THOUSANDS/ΜL (ref 1.85–7.62)
NEUTS SEG NFR BLD AUTO: 72 % (ref 43–75)
NONHDLC SERPL-MCNC: 153 MG/DL
NRBC BLD AUTO-RTO: 0 /100 WBCS
PLATELET # BLD AUTO: 292 THOUSANDS/UL (ref 149–390)
PMV BLD AUTO: 11.7 FL (ref 8.9–12.7)
POTASSIUM SERPL-SCNC: 4.8 MMOL/L (ref 3.5–5.3)
PSA SERPL-MCNC: 0.8 NG/ML (ref 0–4)
RBC # BLD AUTO: 5.18 MILLION/UL (ref 3.88–5.62)
SODIUM SERPL-SCNC: 141 MMOL/L (ref 136–145)
TRIGL SERPL-MCNC: 103 MG/DL
TSH SERPL DL<=0.05 MIU/L-ACNC: 1.26 UIU/ML (ref 0.36–3.74)
WBC # BLD AUTO: 9.77 THOUSAND/UL (ref 4.31–10.16)

## 2021-02-08 PROCEDURE — 80048 BASIC METABOLIC PNL TOTAL CA: CPT

## 2021-02-08 PROCEDURE — 80061 LIPID PANEL: CPT

## 2021-02-08 PROCEDURE — 84443 ASSAY THYROID STIM HORMONE: CPT

## 2021-02-08 PROCEDURE — G0103 PSA SCREENING: HCPCS

## 2021-02-08 PROCEDURE — 85025 COMPLETE CBC W/AUTO DIFF WBC: CPT

## 2021-02-08 PROCEDURE — 36415 COLL VENOUS BLD VENIPUNCTURE: CPT

## 2021-02-08 PROCEDURE — 83036 HEMOGLOBIN GLYCOSYLATED A1C: CPT

## 2021-02-09 DIAGNOSIS — E78.00 HYPERCHOLESTEREMIA: Primary | ICD-10-CM

## 2021-03-30 ENCOUNTER — OFFICE VISIT (OUTPATIENT)
Dept: FAMILY MEDICINE CLINIC | Facility: CLINIC | Age: 56
End: 2021-03-30

## 2021-03-30 VITALS
WEIGHT: 200 LBS | TEMPERATURE: 97.6 F | HEART RATE: 77 BPM | OXYGEN SATURATION: 96 % | SYSTOLIC BLOOD PRESSURE: 170 MMHG | BODY MASS INDEX: 32.14 KG/M2 | HEIGHT: 66 IN | RESPIRATION RATE: 18 BRPM | DIASTOLIC BLOOD PRESSURE: 102 MMHG

## 2021-03-30 DIAGNOSIS — I10 HYPERTENSION, UNSPECIFIED TYPE: ICD-10-CM

## 2021-03-30 DIAGNOSIS — J45.20 MILD INTERMITTENT ASTHMA, UNSPECIFIED WHETHER COMPLICATED: Primary | ICD-10-CM

## 2021-03-30 DIAGNOSIS — E78.5 DYSLIPIDEMIA: ICD-10-CM

## 2021-03-30 DIAGNOSIS — R06.83 LOUD SNORING: ICD-10-CM

## 2021-03-30 PROCEDURE — 99215 OFFICE O/P EST HI 40 MIN: CPT | Performed by: NURSE PRACTITIONER

## 2021-03-30 RX ORDER — LISINOPRIL 40 MG/1
40 TABLET ORAL DAILY
Qty: 90 TABLET | Refills: 2 | Status: SHIPPED | OUTPATIENT
Start: 2021-03-30 | End: 2021-10-13 | Stop reason: SDUPTHER

## 2021-03-30 RX ORDER — AMLODIPINE BESYLATE 5 MG/1
5 TABLET ORAL DAILY
Qty: 90 TABLET | Refills: 3 | Status: SHIPPED | OUTPATIENT
Start: 2021-03-30 | End: 2021-07-13 | Stop reason: SDUPTHER

## 2021-03-30 RX ORDER — METOPROLOL TARTRATE 50 MG/1
50 TABLET, FILM COATED ORAL EVERY 12 HOURS SCHEDULED
Qty: 90 TABLET | Refills: 2 | Status: SHIPPED | OUTPATIENT
Start: 2021-03-30 | End: 2021-07-13 | Stop reason: SDUPTHER

## 2021-03-30 RX ORDER — MONTELUKAST SODIUM 10 MG/1
10 TABLET ORAL
Qty: 90 TABLET | Refills: 3 | Status: SHIPPED | OUTPATIENT
Start: 2021-03-30 | End: 2021-07-13 | Stop reason: SDUPTHER

## 2021-03-30 NOTE — PROGRESS NOTES
Assessment/Plan:    Problem List Items Addressed This Visit        Respiratory    Asthma - Primary     Uncontrolled  Step up to moderate persistent  Add combined daily inhaler, cont albuterol PRN  Due to difficulty sleeping/breathing at night will also add Singulair  Instruction and medication counseling provided to patient; acknowledges understanding  Relevant Medications    montelukast (SINGULAIR) 10 mg tablet    fluticasone-salmeterol (Advair Diskus) 250-50 mcg/dose inhaler       Cardiovascular and Mediastinum    Hypertension     Improved over last visit but remains uncontrolled  Pt continues to increase exercise w/daily walks and reduce sodium  Reviewed DASH diet with patient  Add CCB to ACE and BB  Pt states he already urinates too much to agree to diuretic (prediabetic - currently making lifestyle changes appropriate to bring down A1C)  Returing in 2 weeks for BP check  Relevant Medications    metoprolol tartrate (LOPRESSOR) 50 mg tablet    lisinopril (ZESTRIL) 40 mg tablet    amLODIPine (NORVASC) 5 mg tablet    Other Relevant Orders    Ambulatory referral to Sleep Medicine       Other    Dyslipidemia     Reducing sat fat, cholesterol, carbs  Concurrently counseling patient on prediabetes  States he's exercising and cutting poor diet choices  Began lipitor last month after lipid panel, continue the same until next check  Loud snoring     ENT visit scheduled, may consider anatomical etiology of snoring  Referring also to Sleep medicine for apnea study  Singulair added to asthma regimen  Pt denies daytime somnolence  Relevant Orders    Ambulatory Referral to Otolaryngology    Ambulatory referral to Sleep Medicine            Return in about 2 weeks (around 4/13/2021) for Recheck BP NURSE VISIT      I have spent 40 minutes with Patient  today in which greater than 50% of this time was spent in counseling/coordination of care regarding Diagnostic results, Prognosis, Risks and benefits of tx options, Intructions for management, Patient and family education, Importance of tx compliance, Risk factor reductions, and Impressions as well as reviewing recent notes from specialist visits and relevant test and imaging results  Subjective:     HPI: Olimpia Gonzalez is a 60 y o  male who  has a past medical history of Hyperlipidemia and Hypertension  who presented to the office today for a follow-up hypertension, prediabetes, hyperlipidemia, snoring and asthma  Last months routine lab work revealed high cholesterol and patient was started on Lipitor  He endorses compliance with this new medication  He does disclose that he has been using his albuterol palm but 2-3 times daily  He also states he has shortness of breath and wheezing at night  Worsening the situation is loud snoring which prevents his wife from being able to sleep  He denies daytime somnolence  He is scheduled to see an ear nose and throat doctor for allergies and tinnitus but may be evaluated for possible anatomical contributions to snoring  He will also be referred to Sleep Medicine  He is hypertensive at today's visit as has been his trend over the last few encounters but it has improved with the addition of an Ace  He does state that he has been making dietary changes by reducing salt as well as increasing his exercise by walking more  He has no other concerns or questions at this time      The following portions of the patient's history were reviewed and updated as appropriate: allergies, current medications, past family history, past medical history, past social history, past surgical history, and problem list     Current Outpatient Medications on File Prior to Visit   Medication Sig Dispense Refill    albuterol (PROVENTIL HFA,VENTOLIN HFA) 90 mcg/act inhaler Inhale 2 puffs every 6 (six) hours as needed for wheezing or shortness of breath 3 Inhaler 3    atorvastatin (LIPITOR) 40 mg tablet Take 40 mg by mouth daily Take 1 Tablet by Mouth once Daily      cetirizine (ZyrTEC) 10 mg tablet Take 1 tablet (10 mg total) by mouth daily 30 tablet 1    Diclofenac Sodium (VOLTAREN) 1 % diclofenac 1 % topical gel      tadalafil (CIALIS) 5 MG tablet Take 1 tablet (5 mg total) by mouth daily as needed for erectile dysfunction 10 tablet 0    [DISCONTINUED] lisinopril (ZESTRIL) 40 mg tablet Take 50 mg by mouth daily Take 1 tablet by mouth once daily   [DISCONTINUED] metoprolol tartrate (LOPRESSOR) 25 mg tablet Take 0 5 tablets (12 5 mg total) by mouth every 12 (twelve) hours 60 tablet 1    Lidocaine Viscous HCl (XYLOCAINE) 2 % mucosal solution Swish and spit 10 mL 4 (four) times a day as needed for mouth pain or discomfort (Patient not taking: Reported on 1/22/2021) 100 mL 0    meclizine (ANTIVERT) 25 mg tablet Take 2 tablets (50 mg total) by mouth every 12 (twelve) hours as needed for dizziness (Patient not taking: Reported on 1/22/2021) 30 tablet 0     No current facility-administered medications on file prior to visit  Review of Systems   Constitutional: Negative for activity change, chills, fatigue, fever and unexpected weight change  HENT: Negative for congestion, ear pain, hearing loss, rhinorrhea, sinus pressure and sore throat  Eyes: Negative for pain and visual disturbance  Respiratory: Positive for shortness of breath and wheezing  Negative for cough  Cardiovascular: Negative for chest pain, palpitations and leg swelling  Gastrointestinal: Negative for abdominal pain, nausea and vomiting  Genitourinary: Negative for dysuria and hematuria  Musculoskeletal: Negative for arthralgias, back pain, gait problem, joint swelling and myalgias  Skin: Negative for color change and rash  Neurological: Negative for dizziness, tremors, seizures, syncope, weakness, light-headedness and headaches     Psychiatric/Behavioral: Negative for agitation, behavioral problems, confusion, dysphoric mood, self-injury, sleep disturbance and suicidal ideas  The patient is not nervous/anxious  All other systems reviewed and are negative  Objective:    BP (!) 170/102 (BP Location: Left arm, Patient Position: Sitting, Cuff Size: Large)   Pulse 77   Temp 97 6 °F (36 4 °C) (Temporal)   Resp 18   Ht 5' 6" (1 676 m)   Wt 90 7 kg (200 lb)   SpO2 96%   BMI 32 28 kg/m²     Physical Exam  Constitutional:       Appearance: Normal appearance  HENT:      Head: Normocephalic  Right Ear: Tympanic membrane, ear canal and external ear normal  There is no impacted cerumen  Left Ear: Tympanic membrane, ear canal and external ear normal  There is no impacted cerumen  Nose: Nose normal       Mouth/Throat:      Mouth: Mucous membranes are moist    Eyes:      Extraocular Movements: Extraocular movements intact  Pupils: Pupils are equal, round, and reactive to light  Neck:      Musculoskeletal: Normal range of motion  Cardiovascular:      Rate and Rhythm: Normal rate and regular rhythm  Pulses: Normal pulses  Heart sounds: Normal heart sounds  Pulmonary:      Effort: Pulmonary effort is normal       Breath sounds: Normal breath sounds  Abdominal:      General: Bowel sounds are normal       Palpations: Abdomen is soft  Musculoskeletal: Normal range of motion  General: No tenderness or signs of injury  Right lower leg: No edema  Left lower leg: No edema  Skin:     General: Skin is warm and dry  Capillary Refill: Capillary refill takes less than 2 seconds  Findings: No bruising, lesion or rash  Neurological:      General: No focal deficit present  Mental Status: He is alert and oriented to person, place, and time  Psychiatric:         Mood and Affect: Mood normal          Behavior: Behavior normal          Thought Content:  Thought content normal          MAREN Jacobs  03/30/21  3:56 PM    Patient Instructions     Plan de alimentación con "enfoque dietético para detener la hipertensión (DASH, por edwige siglas en inglés)   LO QUE NECESITA SABER:   El plan de alimentación DASH está diseñado para ayudar a prevenir o disminuir la hipertensión  También puede ayudar a bajar el colesterol terrance (colesterol LDL) y disminuír zapata riesgo de enfermedad cardíaca  El plan es bajo en sodio, azúcar, grasas dañinas, y grasas en zapata totalidad  Es alto en potasio, calcio, magnesio y Jossie  Estos nutrientes se agregan al consumir más frutas, vegetales y granos enteros  INSTRUCCIONES SOBRE EL GALE HOSPITALARIA:   Zapata límite de sodio cada día: Zapata dietista le indicará la cantidad de sodio que usted debe consumir a diario  La gente que tiene la presión arterial gale debe consumir de 1,500 a 2,300 mg de sodio al día delfina toby  Liz cucharadita (cdta) de sal tiene 2,300 mg de sodio  [place] puede parecer delfina liz meta difícil, blair pequeños cambios en los alimentos que usted consume pueden hacer liz gran diferencia  Zapata médico o dietista puede ayudarlo a crear un plan alimenticio que cumpla zapata límite de sodio  Formas de limitar el sodio:  · Inna las etiquetas de los alimentos  Las etiquetas pueden ayudarle a escoger alimentos bajos en sodio  La cantidad de sodio está incluida en miligramos (mg)  La columna del porcentaje de valor diario indica la cantidad de necesidades diarias satisfechas con 1 porción del alimento para cada nutriente en la lista  Escoja alimentos que tengan menos de 5% del porcentaje diario de [place]  Estos alimentos se consideran bajos en sodio  Los alimentos que tienen 20% o más del porcentaje diario de sodio se consideran alimentos altos en sodio  Evite alimentos que tengan más de 300 mg de sodio por porción  Escoja alimentos Sharon foy que son bajos en sodio, con sodio reducido, o sin sal agregada  · Evite la sal  No le agregue sal a la comida cuando se siente a la pulliam a comer, y agregue muy poca sal a los alimentos kyle la cocción   Use hierbas y condimentos, delfina cebollas, ajo y especias sin sal para agregar sabor a los alimentos  Use jugo de lima, yecenia o vinagre para darle a los alimentos un sabor ácido  Use chiles picantes o liz cantidad pequeña de salsa picante para agregar un sabor picante a los alimentos  · Pregunte sobre los sustitutos para la sal  Pregúntele a mast médico si es posible usar sustitutos de la sal  Algunos sustitutos de la sal vienen con ingredientes que pueden ser dañinos si usted tiene ciertos padecimientos médicos  · Escoja los alimentos cuidadosamente cuando sale a comer a restaurantes: las comidas de los restaurantes, sobre todo restaurantes de comida rápida, jeffy siempre son altas en sodio  Algunos restaurantes ofrecen información nutricional que indica la cantidad de sodio en edwige alimentos  Pida que preparen edwige comidas con menos sal o sin sal     Lo que necesita saber acerca de las grasas:  · Incluya grasas saludables  Las grasas insaturadas y los ácidos grasos omega-3 son ejemplos de grasas saludables  Las grasas insaturadas se encuentran en los aceites de soja, [place], [place] o [place] y la margarina [facility] y [facility]  Los ácidos grasos [place] se encuentran en el pescado con grasa, delfina el salmón, el atún, la caballa y las amaris  También se le puede encontrar en el aceita de linaza y en la linaza molida  · Evite las grasas insalubres  No consuma grasas dañinas, delfina grasas saturadas y grasas trans  Las grasas saturadas se encuentran en alimentos que contienen grasa animal  Conrath Abide son [place] grasosas, la [place], la [place], la crema y otros productos lácteos  Además se pueden encontrar en la [place], la margarina en valeri, el aceite de golden y el aceite de antonio  Las grasas trans se encuentran en comidas fritas, galletas estilo soda, tortillitas tostadas, y alimentos horneados hechos con [place]      Alimentos que puede incluir: Con el plan de alimentación DASH, usted necesita consumir un número específico de porciones de cada nikolas de alimentos  [place] le ayudará a consumir las cantidades suficientes de ciertos nutrientes y limitar otros  La cantidad de porciones que usted debe comer depende de la cantidad de calorías que usted necesita  Zapata dietista le informará sobre cuántas calorías necesita usted  El número de porciones que viene en la lista al lado de los grupos alimenticios a continuación es para las personas que necesitan aproximadamente 2,000 calorías al día  · Granos: 6 a 8 porciones (3 de estas porciones deben ser de alimentos de granos enteros o integrales)    ? 1 tajada de pan integral    ? 1 onza de cereal seco    ? ½ taza de cereal cocinado, pasta, o arroz integral    · Verduras y frutas: 4 a 5 porciones de frutas y 4 a 5 porciones de vegetales    ? 1 fruta mediana    ? ½ taza de vegetales congelados, enlatados (sin sal adicional), o vegetales frescos y picados    ? ½ taza de fruta fresca, congelada, seca o enlatada (enlatada en sirope liviano o jugo de fruta)    ? ½ taza de jugo de verduras o frutas    · Productos lácteos: 2 a 3 porciones    ? [address] 1%    ? 1½ onzas de queso descremado o bajo en grasas y bajo en sodio    ? 6 onzas de yogurt descremado o bajo en grasas    · Addy henriquez, addy fransico y pescado magros: 6 onzas o menos    ? Addy fransico (byron, pavo) sin piel    ? Pescado (sobre todo pescado con grasa, delfina salmón, atún fresco o FISH)    ? Carne de res o de cerdo magra (Ivon Gabriel extra Juno Palacios)    ? Claras de huevo y sustitutos del huevo    · [place] du sac, semillas y legumbres: 4 a 5 porciones por semana    ? ½ taza de frijoles y arbejas cocinadas    ? 1½ onzas de nueces sin sal    ? 2 cucharadas de mantequilla de maní o semillas    · Dulces y azúcares adicionales: 5 o menos por semana    ? 1 cucharada de azúcar, jalea o mermelada    ? ½ taza de sorbeto o gelatina    ?  1 taza de limonada    · Grasas: 2 a 3 porciones por semana    ? 1 cucharadita de margarina suave o aceite vegetal    ? 1 cucharada de mayonesa    ? 2 cucharadas de aderezo para [facility] [employer] se deben evitar:  · Granos:     ? Postres horneados o fritos delfina donas, pastelitos, galletas, y quequitos (altos en grasas y azúcar)    ? Mezclas para hacer pan de maíz y pasteles, alimentos empacados, delfina rellenos para pavo, mezclas para hacer arroz y pasta, macarrones con Ro-barre, y cereales instantáneos (altos en sodio)    · Samia Butts y verduras:     ? Vegetales regulares enlatados (altos en sodio)    ? Repollo preparado en vinagre, vegetales en vinagre, y otros alimentos preparados en escabeche (altos en sodio)    ? Vegetales fritos o vegetales en mantequilla o salsas altas en grasas    ? Samia Butts en crema o salsa de mantequilla (altas en grasas)    · Productos lácteos:     ? Leche entera, leche semi descremada (2%), y crema (alexander en grasas)    ? Queso regular y [place]-barre procesado (alto en grasa y [place])    · Addy y alimentos con proteínas:     ? Addy ahumadas o curadas, delfina carne de jessica en conserva, tocino, jamón, perros calientes, y salchicha (alexander en grasas y [place])    ? Frijoles enlatados y addy enlatadas o addy en pasta, delfina addy en conserva, amaris, anchoas y mariscos de imitación (altos en sodio)    ? Addy para emparedados, delfina [place], [place], [place], y carne de res en rebanada (altos en sodio)    ? Addy altas en grasas (biste estilo T-bone, carne molida para hamburguesas, costillas)    ? Huevos enteros y yemas de huevo (altos en grasas)    · Otros:     ? Condimentos hechos con sal, delfina sal de ajo, sal de apio, sal de cebolla, sal condimentada, suavizantes para addy, y glutamato de monosodio (MSG, por edwige siglas en inglés)    ? Sopa Miso y mezclas para sopa enlatadas o secas (altas en sodio)    ?  Salsa de soya regular, salsa de [facility], salsa [place], salsa para bistec, [address], y [address] con sabor (altas en sodio)    ? Condimentos regulares, delfina mostaza, salsa de tomate y aderezos para ensalada (altos en sodio)    ? Salsa para edgard y salsas en general, delfina salsa [place] o de queso (altas en sodio y [place])    ? Bebidas altas en azúcar, delfina bebidas gaseosas o jugos de frutas    ? Alimentos para merendar, delfina memo tostadas, palomitas de [place], pretzels, piel de cerdo, galletas de soda saladas, y nueces saladas    ? Alimentos congelados, delfina cenas preparadas, platos principales, vegetales con salsas, y edgard cubiertas en pan (altas en sodio)    Otras pautas que debe seguir:  · Mantenga un peso saludable  Mast riesgo de enfermedad cardíaca es aún más alto si usted tiene sobrepeso  Mast médico podría sugerirle que adelgace si tiene sobrepeso  Usted puede perder peso si se propone consumir menos calorías y alimentos que tengan azúcar y grasas agregadas  El plan de alimentación DASH puede ayudarle a lograrlo  [place] buena forma de disminuir el consumo de calorías es consumiendo porciones más pequeñas en cada comida y menos meriendas entre comidas  Consulte a mast médico para obtener más información sobre cómo adelgazar  · Realice actividad física con regularidad  El ejercicio regular puede ayudarle a alcanzar o mantener un peso saludable  El ejercicio regular también puede ayudarle a disminuir mast presión arterial y mejorar edwige niveles de colesterol  Ottoniel ejercicios moderados por 30 minutos o más todos los días de la [place]  Para bajar peso, asegúrese de ejercitarse por lo menos 60 minutos  Consulte con mast médico sobre un programa de ejercicio adecuado para usted  · Limite el consumo de alcohol  Las mujeres deberían limitar el consumo de alcohol a 1 bebida por día  Los hombres deberían limitar el consumo de alcohol a 2 tragos al día  Un trago equivale a 12 onzas de cerveza, 5 onzas de vino o 1 onza y ½ de licor      © Copyright [employer] 2020 Information is for End User's use only and may not be sold, redistributed or otherwise used for commercial purposes  All illustrations and images included in CareNotes® are the copyrighted property of A D A M , Inc  or [address] [place] [address] es sólo para uso en educación  Mast intención no es darle un consejo médico sobre enfermedades o tratamientos  Colsulte con mast [place] Horde farmacéutico antes de seguir cualquier régimen médico para saber si es seguro y efectivo para usted

## 2021-03-30 NOTE — ASSESSMENT & PLAN NOTE
Improved over last visit but remains uncontrolled  Pt continues to increase exercise w/daily walks and reduce sodium  Reviewed DASH diet with patient  Add CCB to ACE and BB  Pt states he already urinates too much to agree to diuretic (prediabetic - currently making lifestyle changes appropriate to bring down A1C)  Returing in 2 weeks for BP check

## 2021-03-30 NOTE — ASSESSMENT & PLAN NOTE
ENT visit scheduled, may consider anatomical etiology of snoring  Referring also to Sleep medicine for apnea study  Singulair added to asthma regimen  Pt denies daytime somnolence

## 2021-03-30 NOTE — PATIENT INSTRUCTIONS
Plan de alimentación con "enfoque dietético para detener la hipertensión (DASH, por edwige siglas en inglés)   LO QUE NECESITA SABER:   El plan de alimentación DASH está diseñado para ayudar a prevenir o disminuir la hipertensión  También puede ayudar a bajar el colesterol terrance (colesterol LDL) y disminuír mast riesgo de enfermedad cardíaca  El plan es bajo en sodio, azúcar, grasas dañinas, y grasas en mast totalidad  Es alto en potasio, calcio, magnesio y [place]  Estos nutrientes se agregan al consumir más frutas, vegetales y granos enteros  INSTRUCCIONES SOBRE EL GALE HOSPITALARIA:   Mast límite de sodio cada día: Mast dietista le indicará la cantidad de sodio que usted debe consumir a diario  La gente que tiene la presión arterial gale debe consumir de 1,500 a 2,300 mg de sodio al día delfina toby  Liz cucharadita (cdta) de sal tiene 2,300 mg de sodio  [place] puede parecer delfina liz meta difícil, blair pequeños cambios en los alimentos que usted consume pueden hacer liz gran diferencia  Mast médico o dietista puede ayudarlo a crear un plan alimenticio que cumpla mast límite de sodio  Formas de limitar el sodio:  · Inna las etiquetas de los alimentos  Las etiquetas pueden ayudarle a escoger alimentos bajos en sodio  La cantidad de sodio está incluida en miligramos (mg)  La columna del porcentaje de valor diario indica la cantidad de necesidades diarias satisfechas con 1 porción del alimento para cada nutriente en la lista  Escoja alimentos que tengan menos de 5% del porcentaje diario de [place]  Estos alimentos se consideran bajos en sodio  Los alimentos que tienen 20% o más del porcentaje diario de sodio se consideran alimentos altos en sodio  Evite alimentos que tengan más de 300 mg de sodio por porción  Escoja alimentos Ortiz Finely diga que son bajos en sodio, con sodio reducido, o sin sal agregada           · Evite la sal  No le agregue sal a la comida cuando se siente a la pulliam a comer, y agregue muy poca sal a los alimentos kyle la cocción  Use hierbas y condimentos, delfina cebollas, ajo y especias sin sal para agregar sabor a los alimentos  Use jugo de lima, yecenia o vinagre para darle a los alimentos un sabor ácido  Use chiles picantes o liz cantidad pequeña de salsa picante para agregar un sabor picante a los alimentos  · Pregunte sobre los sustitutos para la sal  Pregúntele a mast médico si es posible usar sustitutos de la sal  Algunos sustitutos de la sal vienen con ingredientes que pueden ser dañinos si usted tiene ciertos padecimientos médicos  · Escoja los alimentos cuidadosamente cuando sale a comer a restaurantes: las comidas de los restaurantes, sobre todo restaurantes de comida rápida, jeffy siempre son altas en sodio  Algunos restaurantes ofrecen información nutricional que indica la cantidad de sodio en edwige alimentos  Pida que preparen edwige comidas con menos sal o sin sal     Lo que necesita saber acerca de las grasas:  · Incluya grasas saludables  Las grasas insaturadas y los ácidos grasos omega-3 son ejemplos de grasas saludables  Las grasas insaturadas se encuentran en los aceites de soja, [place], [place] o [place] y la margarina [place] [facility] y [facility]  Los ácidos grasos [place] 3 se encuentran en el pescado con grasa, delfina el salmón, el atún, la caballa y las amaris  También se le puede encontrar en el aceita de linaza y en la linaza molida  · Evite las grasas insalubres  No consuma grasas dañinas, delfina grasas saturadas y grasas trans  Las grasas saturadas se encuentran en alimentos que contienen grasa animal  Sammy Minor son Joe garcia, la [place], la [facility] york, la crema y otros productos lácteos  Además se pueden encontrar en la [place], la margarina en valeri, el aceite de golden y el aceite de antonio  Las grasas trans se encuentran en comidas fritas, galletas estilo soda, tortillitas tostadas, y alimentos horneados hechos con Leva Lulu      Alimentos que puede incluir: Con el plan de alimentación DASH, usted necesita consumir un número específico de porciones de cada nikolas de alimentos  [place] le ayudará a consumir las cantidades suficientes de ciertos nutrientes y limitar otros  La cantidad de porciones que usted debe comer depende de la cantidad de calorías que usted necesita  [place] dietista le informará sobre cuántas calorías necesita usted  El número de porciones que viene en la lista al lado de los grupos alimenticios a continuación es para las personas que necesitan aproximadamente 2,000 calorías al día  · Granos: 6 a 8 porciones (3 de estas porciones deben ser de alimentos de granos enteros o integrales)    ? 1 tajada de pan integral    ? 1 onza de cereal seco    ? ½ taza de cereal cocinado, pasta, o arroz integral    · Verduras y frutas: 4 a 5 porciones de frutas y 4 a 5 porciones de vegetales    ? 1 fruta mediana    ? ½ taza de vegetales congelados, enlatados (sin sal adicional), o vegetales frescos y picados    ? ½ taza de fruta fresca, congelada, seca o enlatada (enlatada en sirope liviano o jugo de fruta)    ? ½ taza de jugo de verduras o frutas    · Productos lácteos: 2 a 3 porciones    ? [address] 1%    ? 1½ onzas de queso descremado o bajo en grasas y bajo en sodio    ? 6 onzas de yogurt descremado o bajo en grasas    · Addy henriquez, addy fransico y pescado magros: 6 onzas o menos    ? Addy fransico (byron, pavo) sin piel    ? Pescado (sobre todo pescado con grasa, delfina salmón, atún fresco o FISH)    ? Carne de res o de cerdo magra (Ivon Palacios)    ? Claras de huevo y sustitutos del huevo    · [place] du sac, semillas y legumbres: 4 a 5 porciones por semana    ? ½ taza de frijoles y arbejas cocinadas    ? 1½ onzas de nueces sin sal    ? 2 cucharadas de mantequilla de maní o semillas    · Dulces y azúcares adicionales: 5 o menos por semana    ? 1 cucharada de azúcar, jalea o mermelada    ? ½ taza de sorbeto o gelatina    ?  1 taza de limonada    · Grasas: 2 a 3 porciones por semana    ? 1 cucharadita de margarina suave o aceite vegetal    ? 1 cucharada de mayonesa    ? 2 cucharadas de aderezo para [facility] [employer] se deben evitar:  · Granos:     ? Postres horneados o fritos delfina donas, pastelitos, galletas, y quequitos (altos en grasas y azúcar)    ? Mezclas para hacer pan de maíz y pasteles, alimentos empacados, delfina rellenos para pavo, mezclas para hacer arroz y pasta, macarrones con [place], y cereales instantáneos (altos en sodio)    · [place] y verduras:     ? Vegetales regulares enlatados (altos en sodio)    ? Repollo preparado en vinagre, vegetales en vinagre, y otros alimentos preparados en escabeche (altos en sodio)    ? Vegetales fritos o vegetales en mantequilla o salsas altas en grasas    ? [place] en crema o salsa de mantequilla (altas en grasas)    · Productos lácteos:     ? Leche entera, leche semi descremada (2%), y crema (alexander en grasas)    ? Queso regular y [place]-barre procesado (alto en grasa y [place])    · Addy y alimentos con proteínas:     ? Addy ahumadas o curadas, delfina carne de jessica en conserva, tocino, jamón, perros calientes, y salchicha (alexander en grasas y [place])    ? Frijoles enlatados y addy enlatadas o addy en pasta, delfina addy en conserva, amaris, anchoas y mariscos de imitación (altos en sodio)    ? Addy para emparedados, delfina [place], [place], [place], y carne de res en rebanada (altos en sodio)    ? Addy altas en grasas (bistec estilo T-bone, carne molida para hamburguesas, costillas)    ? Huevos enteros y yemas de huevo (altos en grasas)    · Otros:     ? Condimentos hechos con sal, delfina sal de ajo, sal de apio, sal de cebolla, sal condimentada, suavizantes para addy, y glutamato de monosodio (MSG, por edwige siglas en inglés)    ? Sopa Miso y mezclas para sopa enlatadas o secas (altas en sodio)    ?  Salsa de soya regular, salsa de [address], salsa Marvin, salsa para bistec, [employer], y la [address] (altas en sodio)    ? Condimentos regulares, delfina mostaza, salsa de tomate y aderezos para ensalada (altos en sodio)    ? Salsa para edgard y salsas en general, delfina salsa [place] o de queso (altas en sodio y [place])    ? Bebidas altas en azúcar, delfina bebidas gaseosas o jugos de frutas    ? Alimentos para merendar, delfina memo tostadas, palomitas de [place], pretzels, piel de cerdo, galletas de soda saladas, y nueces saladas    ? Alimentos congelados, delfina cenas preparadas, platos principales, vegetales con salsas, y edgard cubiertas en pan (altas en sodio)    Otras pautas que debe seguir:  · Mantenga un peso saludable  Mast riesgo de enfermedad cardíaca es aún más alto si usted tiene sobrepeso  Mast médico podría sugerirle que adelgace si tiene sobrepeso  Usted puede perder peso si se propone consumir menos calorías y alimentos que tengan azúcar y grasas agregadas  El plan de alimentación DASH puede ayudarle a lograrlo  [place] buena forma de disminuir el consumo de calorías es consumiendo porciones más pequeñas en cada comida y menos meriendas entre comidas  Consulte a mast médico para obtener más información sobre cómo adelgazar  · Realice actividad física con regularidad  El ejercicio regular puede ayudarle a alcanzar o mantener un peso saludable  El ejercicio regular también puede ayudarle a disminuir mast presión arterial y mejorar edwige niveles de colesterol  Ottoniel ejercicios moderados por 30 minutos o más todos los días de la [place]  Para bajar peso, asegúrese de ejercitarse por lo menos 60 minutos  Consulte con mast médico sobre un programa de ejercicio adecuado para usted  · Limite el consumo de alcohol  Las mujeres deberían limitar el consumo de alcohol a 1 bebida por día  Los hombres deberían limitar el consumo de alcohol a 2 tragos al día  Un trago equivale a 12 onzas de cerveza, 5 onzas de vino o 1 onza y ½ de licor      © Copyright [place] [employer] 2020 Information is for End User's use only and may not be sold, redistributed or otherwise used for commercial purposes  All illustrations and images included in CareNotes® are the copyrighted property of [employer]  or [address] [place] [address] es sólo para uso en educación  Mast intención no es darle un consejo médico sobre enfermedades o tratamientos  Colsulte con mast Peter Carmichael farmacéutico antes de seguir cualquier régimen médico para saber si es seguro y efectivo para usted

## 2021-03-30 NOTE — ASSESSMENT & PLAN NOTE
Reducing sat fat, cholesterol, carbs  Concurrently counseling patient on prediabetes  States he's exercising and cutting poor diet choices  Began lipitor last month after lipid panel, continue the same until next check

## 2021-03-30 NOTE — ASSESSMENT & PLAN NOTE
Uncontrolled  Step up to moderate persistent  Add combined daily inhaler, cont albuterol PRN  Due to difficulty sleeping/breathing at night will also add Singulair  Instruction and medication counseling provided to patient; acknowledges understanding

## 2021-04-15 DIAGNOSIS — E78.5 HYPERLIPIDEMIA, UNSPECIFIED HYPERLIPIDEMIA TYPE: Primary | ICD-10-CM

## 2021-04-15 NOTE — TELEPHONE ENCOUNTER
Pt came in and is requesting refill of following medication      atorvastatin (LIPITOR) 40 mg tablet

## 2021-04-16 RX ORDER — ATORVASTATIN CALCIUM 40 MG/1
40 TABLET, FILM COATED ORAL DAILY
Qty: 90 TABLET | Refills: 2 | Status: SHIPPED | OUTPATIENT
Start: 2021-04-16 | End: 2022-03-16

## 2021-04-21 ENCOUNTER — IMMUNIZATIONS (OUTPATIENT)
Dept: FAMILY MEDICINE CLINIC | Facility: HOSPITAL | Age: 56
End: 2021-04-21

## 2021-04-21 DIAGNOSIS — Z23 ENCOUNTER FOR IMMUNIZATION: Primary | ICD-10-CM

## 2021-04-21 PROCEDURE — 0011A SARS-COV-2 / COVID-19 MRNA VACCINE (MODERNA) 100 MCG: CPT

## 2021-04-21 PROCEDURE — 91301 SARS-COV-2 / COVID-19 MRNA VACCINE (MODERNA) 100 MCG: CPT

## 2021-04-22 ENCOUNTER — TELEPHONE (OUTPATIENT)
Dept: FAMILY MEDICINE CLINIC | Facility: CLINIC | Age: 56
End: 2021-04-22

## 2021-04-22 NOTE — TELEPHONE ENCOUNTER
Patient had called earlier this week stating [address] was too far to get to and requested referral be sent to [facility] sleep center   Patient handed phone to his S O  I spoke directly with her and let her know referral have faxed referral and demographic sheet and OV notes to [address] per her request

## 2021-04-22 NOTE — TELEPHONE ENCOUNTER
[address] [facility]  Ambulatory Referral To Sleep Medicine  FORM RECEIVED VIA FAX  WILL BE PLACED IN YOUR BIN AT ASSIGNED DELIVERY TIMES

## 2021-04-22 NOTE — TELEPHONE ENCOUNTER
Patient called wanting to go to [facility] on [address] regarding the referral that was put in for Ambulatory referral to Sleep Medicine and Ambulatory Referral to Otolaryngology  Patient would like a call back when referral is done

## 2021-05-13 ENCOUNTER — TELEPHONE (OUTPATIENT)
Dept: SLEEP CENTER | Facility: CLINIC | Age: 56
End: 2021-05-13

## 2021-05-13 ENCOUNTER — OFFICE VISIT (OUTPATIENT)
Dept: FAMILY MEDICINE CLINIC | Facility: CLINIC | Age: 56
End: 2021-05-13

## 2021-05-13 ENCOUNTER — TRANSCRIBE ORDERS (OUTPATIENT)
Dept: SLEEP CENTER | Facility: CLINIC | Age: 56
End: 2021-05-13

## 2021-05-13 VITALS
RESPIRATION RATE: 18 BRPM | SYSTOLIC BLOOD PRESSURE: 148 MMHG | OXYGEN SATURATION: 98 % | WEIGHT: 199 LBS | BODY MASS INDEX: 31.98 KG/M2 | HEIGHT: 66 IN | DIASTOLIC BLOOD PRESSURE: 92 MMHG | TEMPERATURE: 97.8 F | HEART RATE: 87 BPM

## 2021-05-13 DIAGNOSIS — R21 RASH: ICD-10-CM

## 2021-05-13 DIAGNOSIS — R06.83 LOUD SNORING: Primary | ICD-10-CM

## 2021-05-13 PROCEDURE — 99214 OFFICE O/P EST MOD 30 MIN: CPT | Performed by: NURSE PRACTITIONER

## 2021-05-13 RX ORDER — TRIAMCINOLONE ACETONIDE 1 MG/G
CREAM TOPICAL 2 TIMES DAILY
Qty: 30 G | Refills: 0 | Status: SHIPPED | OUTPATIENT
Start: 2021-05-13 | End: 2021-10-13 | Stop reason: SDUPTHER

## 2021-05-13 NOTE — PROGRESS NOTES
Assessment/Plan:    Problem List Items Addressed This Visit        Musculoskeletal and Integument    Rash     Vesicular rash on forearm, same side a covid vaccine received  Advised pt that immune response to vaccine likely caused small localized rash down the same dermatome  He can use kenalog for symptomatic relief  Relevant Medications    triamcinolone (KENALOG) 0 1 % cream       Other    Loud snoring - Primary     Called [facility] sleep center with patient today  Rescheduled for consult for home study  Gave him copy of order to bring with him  No follow-ups on file  Subjective:     HPI: Mai Howell is a 60 y o  male who  has a past medical history of Hyperlipidemia and Hypertension  who presented to the office today for a referral     The following portions of the patient's history were reviewed and updated as appropriate: allergies, current medications, past family history, past medical history, past social history, past surgical history, and problem list     Current Outpatient Medications on File Prior to Visit   Medication Sig Dispense Refill    albuterol (PROVENTIL HFA,VENTOLIN HFA) 90 mcg/act inhaler Inhale 2 puffs every 6 (six) hours as needed for wheezing or shortness of breath 3 Inhaler 3    amLODIPine (NORVASC) 5 mg tablet Take 1 tablet (5 mg total) by mouth daily 90 tablet 3    atorvastatin (LIPITOR) 40 mg tablet Take 1 tablet (40 mg total) by mouth daily Take 1 Tablet by Mouth once Daily 90 tablet 2    cetirizine (ZyrTEC) 10 mg tablet Take 1 tablet (10 mg total) by mouth daily 30 tablet 1    Diclofenac Sodium (VOLTAREN) 1 % diclofenac 1 % topical gel      fluticasone-salmeterol (Advair Diskus) 250-50 mcg/dose inhaler Inhale 1 puff 2 (two) times a day Rinse mouth after use   3 Inhaler 3    Lidocaine Viscous HCl (XYLOCAINE) 2 % mucosal solution Swish and spit 10 mL 4 (four) times a day as needed for mouth pain or discomfort (Patient not taking: Reported on 1/22/2021) 100 mL 0    lisinopril (ZESTRIL) 40 mg tablet Take 1 tablet (40 mg total) by mouth daily Take 1 tablet by mouth once daily  90 tablet 2    meclizine (ANTIVERT) 25 mg tablet Take 2 tablets (50 mg total) by mouth every 12 (twelve) hours as needed for dizziness (Patient not taking: Reported on 1/22/2021) 30 tablet 0    metoprolol tartrate (LOPRESSOR) 50 mg tablet Take 1 tablet (50 mg total) by mouth every 12 (twelve) hours 90 tablet 2    montelukast (SINGULAIR) 10 mg tablet Take 1 tablet (10 mg total) by mouth daily at bedtime 90 tablet 3    tadalafil (CIALIS) 5 MG tablet Take 1 tablet (5 mg total) by mouth daily as needed for erectile dysfunction 10 tablet 0     No current facility-administered medications on file prior to visit  Review of Systems   Constitutional: Negative for activity change, chills, fatigue, fever and unexpected weight change  HENT: Negative for congestion, ear pain, hearing loss, rhinorrhea, sinus pressure and sore throat  Eyes: Negative for pain and visual disturbance  Respiratory: Negative for cough, shortness of breath and wheezing  Cardiovascular: Negative for chest pain, palpitations and leg swelling  Gastrointestinal: Negative for abdominal pain, nausea and vomiting  Genitourinary: Negative for dysuria and hematuria  Musculoskeletal: Negative for arthralgias, back pain, gait problem, joint swelling and myalgias  Skin: Positive for rash  Negative for color change  Right lower arm   Neurological: Negative for dizziness, tremors, seizures, syncope, weakness, light-headedness and headaches  Psychiatric/Behavioral: Positive for sleep disturbance  Negative for agitation, behavioral problems, confusion, dysphoric mood, self-injury and suicidal ideas  The patient is not nervous/anxious  All other systems reviewed and are negative              Objective:    /92 (BP Location: Left arm, Patient Position: Sitting, Cuff Size: Standard)   Pulse 87 Temp 97 8 °F (36 6 °C) (Temporal)   Resp 18   Ht 5' 6" (1 676 m)   Wt 90 3 kg (199 lb)   SpO2 98%   BMI 32 12 kg/m²     Physical Exam  Constitutional:       Appearance: Normal appearance  HENT:      Head: Normocephalic  Right Ear: Tympanic membrane, ear canal and external ear normal  There is no impacted cerumen  Left Ear: Tympanic membrane, ear canal and external ear normal  There is no impacted cerumen  Nose: Nose normal       Mouth/Throat:      Mouth: Mucous membranes are moist    Eyes:      Extraocular Movements: Extraocular movements intact  Pupils: Pupils are equal, round, and reactive to light  Neck:      Musculoskeletal: Normal range of motion  Cardiovascular:      Rate and Rhythm: Normal rate and regular rhythm  Pulses: Normal pulses  Heart sounds: Normal heart sounds  Pulmonary:      Effort: Pulmonary effort is normal       Breath sounds: Normal breath sounds  Abdominal:      General: Bowel sounds are normal       Palpations: Abdomen is soft  Musculoskeletal: Normal range of motion  General: No tenderness or signs of injury  Right lower leg: No edema  Left lower leg: No edema  Skin:     General: Skin is warm and dry  Capillary Refill: Capillary refill takes less than 2 seconds  Findings: No bruising, lesion or rash  Neurological:      General: No focal deficit present  Mental Status: He is alert and oriented to person, place, and time  Psychiatric:         Mood and Affect: Mood normal          Behavior: Behavior normal          Thought Content: Thought content normal          MAREN Brown  05/13/21  3:52 PM    There are no Patient Instructions on file for this visit

## 2021-05-13 NOTE — ASSESSMENT & PLAN NOTE
Called [facility] sleep center with patient today  Rescheduled for consult for home study  Gave him copy of order to bring with him

## 2021-05-13 NOTE — TELEPHONE ENCOUNTER
Nell Gosselin, thank you for doing the legwork on this  He wants to stay with [facility] because he can walk there  [facility] he had us fax the referral to [facility]  Anyway, I called that clinic this morning and arranged an appointment for him for next Thursday  Clinical: can we please call this patient and instruct him to call [facility] at [address] to change his consult for home study to a in-office sleep study  Their number is [phone]

## 2021-05-13 NOTE — TELEPHONE ENCOUNTER
I am from the [facility] sleep disorders center in [place], his order popped into our workque  Since he has [address], which is medical assistance, they wont cover a home study so he would have to come in lab OR he can pay the $250 out of pocket to proceed with the home sleep study  I cant have someone from our office give him a call and explain that to him if you'd like

## 2021-05-13 NOTE — TELEPHONE ENCOUNTER
Patient insurance doesn't cover a home sleep study, please place an order for an in lab diagnostic sleep study  The code is an Cite Say Gonzalez, thank you!

## 2021-05-13 NOTE — ASSESSMENT & PLAN NOTE
Vesicular rash on forearm, same side a covid vaccine received  Advised pt that immune response to vaccine likely caused small localized rash down the same dermatome  He can use kenalog for symptomatic relief

## 2021-05-13 NOTE — TELEPHONE ENCOUNTER
The patient was very understanding regarding his insurance not covering the home sleep study and he was okay coming in for an in lab sleep study  Did you want him to do the sleep study at [facility] or [place] [address]?  If you place the order for the in lab sleep study through [place] [address] I can get him scheduled for our next available, he the patient wants [facility] then I can't schedule him

## 2021-05-14 NOTE — TELEPHONE ENCOUNTER
I called an left pt a detailed msg regarding this information   And to call our office if he has any further questions

## 2021-05-21 ENCOUNTER — IMMUNIZATIONS (OUTPATIENT)
Dept: FAMILY MEDICINE CLINIC | Facility: HOSPITAL | Age: 56
End: 2021-05-21

## 2021-05-21 DIAGNOSIS — Z23 ENCOUNTER FOR IMMUNIZATION: Primary | ICD-10-CM

## 2021-05-21 PROCEDURE — 0012A SARS-COV-2 / COVID-19 MRNA VACCINE (MODERNA) 100 MCG: CPT

## 2021-05-21 PROCEDURE — 91301 SARS-COV-2 / COVID-19 MRNA VACCINE (MODERNA) 100 MCG: CPT

## 2021-07-13 ENCOUNTER — OFFICE VISIT (OUTPATIENT)
Dept: FAMILY MEDICINE CLINIC | Facility: CLINIC | Age: 56
End: 2021-07-13

## 2021-07-13 VITALS
TEMPERATURE: 97.6 F | RESPIRATION RATE: 18 BRPM | BODY MASS INDEX: 31.5 KG/M2 | SYSTOLIC BLOOD PRESSURE: 128 MMHG | OXYGEN SATURATION: 95 % | HEART RATE: 83 BPM | HEIGHT: 66 IN | DIASTOLIC BLOOD PRESSURE: 86 MMHG | WEIGHT: 196 LBS

## 2021-07-13 DIAGNOSIS — S76.301A HAMSTRING INJURY, RIGHT, INITIAL ENCOUNTER: Primary | ICD-10-CM

## 2021-07-13 DIAGNOSIS — R06.83 LOUD SNORING: ICD-10-CM

## 2021-07-13 DIAGNOSIS — J45.20 MILD INTERMITTENT ASTHMA, UNSPECIFIED WHETHER COMPLICATED: ICD-10-CM

## 2021-07-13 DIAGNOSIS — I10 HYPERTENSION, UNSPECIFIED TYPE: ICD-10-CM

## 2021-07-13 PROCEDURE — T1015 CLINIC SERVICE: HCPCS | Performed by: NURSE PRACTITIONER

## 2021-07-13 PROCEDURE — 3074F SYST BP LT 130 MM HG: CPT | Performed by: NURSE PRACTITIONER

## 2021-07-13 PROCEDURE — 99214 OFFICE O/P EST MOD 30 MIN: CPT | Performed by: NURSE PRACTITIONER

## 2021-07-13 PROCEDURE — 3079F DIAST BP 80-89 MM HG: CPT | Performed by: NURSE PRACTITIONER

## 2021-07-13 RX ORDER — AMLODIPINE BESYLATE 5 MG/1
5 TABLET ORAL DAILY
Qty: 90 TABLET | Refills: 3 | Status: SHIPPED | OUTPATIENT
Start: 2021-07-13 | End: 2022-05-09 | Stop reason: SDUPTHER

## 2021-07-13 RX ORDER — METHOCARBAMOL 750 MG/1
750 TABLET, FILM COATED ORAL 2 TIMES DAILY PRN
Qty: 60 TABLET | Refills: 1 | Status: SHIPPED | OUTPATIENT
Start: 2021-07-13

## 2021-07-13 RX ORDER — IBUPROFEN 800 MG/1
800 TABLET ORAL EVERY 6 HOURS PRN
Qty: 30 TABLET | Refills: 0 | Status: SHIPPED | OUTPATIENT
Start: 2021-07-13

## 2021-07-13 RX ORDER — MONTELUKAST SODIUM 10 MG/1
10 TABLET ORAL
Qty: 90 TABLET | Refills: 3 | Status: SHIPPED | OUTPATIENT
Start: 2021-07-13 | End: 2021-10-13 | Stop reason: SDUPTHER

## 2021-07-13 RX ORDER — METOPROLOL TARTRATE 50 MG/1
50 TABLET, FILM COATED ORAL EVERY 12 HOURS SCHEDULED
Qty: 90 TABLET | Refills: 2 | Status: SHIPPED | OUTPATIENT
Start: 2021-07-13 | End: 2021-10-13 | Stop reason: SDUPTHER

## 2021-07-13 NOTE — ASSESSMENT & PLAN NOTE
Improved since starting Advair but explained to patient he needs the maintenance inhaler BID every day, not just when he feels SOB  Explained this is needed for prevention of symptoms  Pt acknowledges understanding

## 2021-07-13 NOTE — ASSESSMENT & PLAN NOTE
Dx with sleep apnea at sleep medicine clinic, CPAP ordered but pt explains device is still in prior auth process  Did lose approx 5 lbs, patient cont to diet and exercise  Complex Repair And A-T Advancement Flap Text: The defect edges were debeveled with a #15 scalpel blade.  The primary defect was closed partially with a complex linear closure.  Given the location of the remaining defect, shape of the defect and the proximity to free margins an A-T advancement flap was deemed most appropriate for complete closure of the defect.  Using a sterile surgical marker, an appropriate advancement flap was drawn incorporating the defect and placing the expected incisions within the relaxed skin tension lines where possible.    The area thus outlined was incised deep to adipose tissue with a #15 scalpel blade.  The skin margins were undermined to an appropriate distance in all directions utilizing iris scissors.

## 2021-07-13 NOTE — PATIENT INSTRUCTIONS
Ejercicios para la bursitis de cadera   CUIDADO AMBULATORIO:   Los ejercicios para la bursitis de cadera ayudan a fortalecer los músculos de la cadera y a mantener la articulación flexible  Los músculos delaney pueden ayudar a reducir el dolor, prevenir lesiones y mantener la articulación estable  Los ejercicios también pueden ayudar para aumentar el rango de movimiento de la articulación de la cadera  Lo que usted necesita saber acerca de la seguridad de los ejercicios:  · Muévase lenta y suavemente  Evite movimientos rápidos o bruscos  [place] ayudará a evitar liz lesión  · Respire normalmente  No contenga la respiración  Es importante inspirar y exhalar para no tensionarse kyle el ejercicio  La tensión le podría impedir  la articulación en un rango completo de movimiento  · Realice los ejercicios y el estiramiento con las dos piernas  Ottoniel esto para que ambas caderas se mantengan delaney y flexibles  · Deténgase si siente dolor luis o aumento del dolor  Comuníquese con mast médico o fisioterapeuta  Es normal que sienta cierta molestia kyle el ejercicio  Practicar los ejercicios con regularidad ayudará a disminuir mast incomodidad con el paso del [place]  · Entre en calor y estírese antes de hacer ejercicio  Camine o jimbo en bicicleta fija kyle 5 a 10 minutos  Cómo hacer estiramientos de cadera: Mast médico o fisioterapeuta le indicará cuántas veces debe hacer cada estiramiento  Ottoniel el estiramiento en ambos lados antes de pasar al siguiente estiramiento  · Estiramiento de arana iliotibial de pie: Párese con la pierna lesionada detrás de la otra pierna  Dóblese hacia el lado que no está lesionado  Deténgase cuando sienta un estiramiento en la parte externa de mast cadera  Mantenga esta posición por 5 a 10 segundos  Luego vuelva a la posición inicial          · Estiramiento de la arana iliotibial recostado: Acuéstese boca madinaiba  Doble la romi del [address] [facility] las [employer] parte exterior de la rodilla y [place]  Tire lentamente la rodilla atravesada sobre mast cuerpo  Deténgase cuando sienta un estiramiento en mast cadera y en la parte exterior del muslo  Mantenga esta posición por 5 a 10 segundos  Regrese mast pierna a la posición inicial          · Estiramiento de cadera: Acuéstese boca arriba con ambas piernas estiradas y en el suelo  Doble la rodilla del lado lesionado hacia el pecho hasta que pueda alcanzar la parte inferior de la pierna  Coloque ambas xavier sobre la espinilla y tire de la [place]  Mantenga esta posición por 5 a 10 segundos  Regrese mast pierna a la posición inicial          · Rodillas al pecho: Acuéstese boca arriba con ambas rodillas dobladas y edwige pies planos sobre el piso  Doble la rodilla del lado lesionado hacia el pecho hasta que pueda alcanzar la parte inferior de la pierna  Coloque ambas xavier sobre la espinilla y tire de la [place]  Mantenga esta posición por 5 a 10 segundos  Regrese mast pierna a la posición inicial          · Estiramiento interno del rotador de cadera: Para nikkie ejercicio, debe acostarse sobre lzi pulliam  Acuéstese de lado con la cadera lesionada en la parte superior  Es posible que le indiquen que coloque un almohadón [employer]  Mueva la pierna superior de modo que el pie cuelgue por debajo del borde de la pulliam  Gire la cadera para levantar el pie en la dirección opuesta al hombro inferior  Levante el pie tan alto delfina pueda para sentir un estiramiento en la parte posterior del muslo  Sostenga está posición por 5 segundos  Luego baje lentamente el pie a la posición de elia  · Estiramiento externo del rotador de cadera: Para nikkie ejercicio, debe acostarse sobre liz pulliam  Acuéstese de lado con la cadera lesionada en la parte inferior  No necesita un almohadón entre los muslos para nikkie ejercicio  Mueva la pierna inferior de modo que el pie cuelgue del borde de la pulliam   Gire la [employer] para levantar el pie en la dirección opuesta al hombro inferior  Levante el pie lo más alto que pueda para sentir un estiramiento en el glúteo  Sostenga está posición por 5 segundos  Luego baje lentamente el pie a la posición de elia  · Estiramiento del flexor de la cadera de rodillas: Arrodíllese sobre mast rodilla en el lado lesionado  Coloque el pie de la otra pierna en el piso para que la rodilla esté doblada  [place] ambas xavier [employer]  Mantenga la Arleen Like y los músculos abdominales tensos  Inclínese hacia adelante hasta que sienta un estiramiento en el otro muslo  Sostenga el estiramiento por 10 segundos  Regrese a la posición original de pie  Cómo realizar los ejercicios de estiramiento de cadera: Mast médico o fisioterapeuta le indicará cuántas veces debe hacer cada ejercicio  Ottoniel el ejercicio en ambos lados antes de pasar al siguiente ejercicio  · Levantamiento de la pierna recta hacia un lado: Griselda ejercicio también se llama abducción de cadera  Acuéstese de lado con las piernas rectas, con la cadera lesionada en la parte superior  Lentamente levante la pierna superior hacia el techo tan alto delfina pueda  Mantenga mast pie apuntando  Sostenga está posición por 5 segundos  Luego baje lentamente la pierna a la posición de elia  · Elevación de la peter interna del muslo: Griselda ejercicio también se llama aducción de cadera  Acuéstese de lado con las piernas rectas, con la cadera lesionada en la parte inferior  Cruce la pierna superior sobre la pierna inferior  Ponga el pie de mast pierna superior en el piso frente a usted  Eleve la pierna inferior [employer] toque la otra pierna  Sostenga está posición por 5 segundos  Luego baje lentamente la piernahed al piso  · Ejercicio de almeja: Acuéstese de lado, de manera que el lado lesionado quede en la parte superior  Doble las rodillas  Mantenga los talones juntos kyle griselda ejercicio   Lentamente levante la rodilla superior [employer] techo  Erica Renetta baje la aleksey hasta que las Mila Linger a juntarse  Llame a mast médico si:  · Tiene dolor luis cuando hace ejercicio o está en reposo  · Tiene preguntas o inquietudes acerca de los estiramientos o ejercicios  © Copyright [address] Information is for End User's use only and may not be sold, redistributed or otherwise used for commercial purposes  All illustrations and images included in CareNotes® are the copyrighted property of [employer]  or [facility] es sólo para uso en educación  Mast intención no es darle un consejo médico sobre enfermedades o tratamientos  Colsulte con mast Velma Cables farmacéutico antes de seguir cualquier régimen médico para saber si es seguro y efectivo para usted  Ejercicios para los isquiotibiales   LO QUE NECESITA SABER:   Los ejercicios para los músculos isquiotibiales ayudan a fortalecer y estirar los músculos que soportan la charlie lumbar, las caderas y la rodilla  Pana disminuye el dolor, mejora la circulación y disminuye el riesgo de lesiones en el futuro  INSTRUCCIONES SOBRE EL GALE HOSPITALARIA:   Comuníquese con mast médico si:  · Tiene dolor luis o creciente cuando hace ejercico o está en reposo  · Usted tiene preguntas o inquietudes acerca de mast condición, cuidado o programa de ejercicios  Milderd Madalyn de manera darling:  · Muévase lenta y suavemente  Evite movimientos rápidos o bruscos  [place] ayudará a evitar otra lesión  · Respire normalmente  No contenga la respiración  Es importante inspirar y exhalar para no tensionarse kyle el ejercicio  La tensión podría [employer] músculos se estiren  · Realice los ejercicios y el estiramiento con las dos piernas  Ottoniel esto para que los músculos de ambas piernas sigan siendo delaney y flexibles  · Deténgase si siente dolor luis o aumento del dolor  Suspenda el ejercicio y póngase en contacto con mast médico si se presentan estos síntomas   Es normal sentir algunas molestias, delfina dolor sordo, kyle el ejercicio  Practicar los ejercicios con regularidad ayudará a disminuir mast incomodidad con el paso del [place]  · Entre en calor y estírese antes de hacer ejercicio  [place] ayudará a evitar liz lesión  Camine o jimbo en bicicleta fija kyle 5 a 10 minutos  Cómo realizar los ejercicios de estiramiento: Pregunte a mast médico con qué frecuencia hacerlos  · Estiramiento de los isquiotibiales con Laverda Nati toalla: Acuéstese sobre mast espalda en el piso  Doble ambas piernas para que edwige pies descansen planos  Levante liz pierna del suelo y enrede liz toalla en mast pie  Agarre los extremos de la toalla y enderece lentamente la pierna que tiene [place]  Use liz toalla para jalar suavemente mast pierna hacia usted hasta que sienta que se estire  Mantenga la pierna derecha y mast pie flexionado hacia mast cuerpo  Sostenga está posición por 30 segundos  Use liz toalla más larga si es necesario  · Sentado, estiramiento del tendón de la corva, parte posterior del muslo: Siéntese en el piso con las piernas enfrente suyo  No apunte con los dedos de edwige pies o flexione edwige pies  Coloque las milo de edwige xavier en el piso y deslice edwige xavier hacia adelante hasta que usted sienta liz resistencia o un estiramiento leve  Mantenga mast Elayne Lark y no bloquee edwige rodillas  Sostenga el estiramiento por 30 segundos  · [employer], estiramiento de los músculos isquiotibiales: Párese con los pies separados a distancia del ancho de caderas  Levante liz pierna y apóyela sobre liz superficie firme, delfina liz pulliam o liz silla  Mantenga los dedos del pie apuntando Arleth Buckle  Deslice las xavier hacia abajo por el costado de la pierna hasta sentir un estiramiento  Mantenga el pecho levantado y la espalda recta  Sostenga está posición por 30 segundos           · Estiramiento de sentado con abertura de piernas: Siéntese en el piso y extienda las piernas tan ampliamente delfina sea posible  Mantenga las piernas rectas e inclínese sobre liz pierna  Deslice las xavier hacia adelante hasta que sienta un estiramiento  Mantenga el pecho levantado y la espalda recta  Sostenga está posición por 30 segundos  Cómo realizar ejercicios de fortalecimiento: Siempre sarai ejercicios de fortalecimiento después de estirar  A medida que se fortalece, mast médico le dirá que agregue peso o más repeticiones a los ejercicios de fortalecimiento  · Encorvamiento de los isquiotibiales: Coloque mast mano en liz pared o detrás de liz silla para equilibrarse  Coloque el peso en liz de edwige piernas  Levante la otra pierna y eleve mast talón hacia edwige glúteos  Sostenga está posición por 5 segundos  Lentamente baje la pierna hasta que quede a unas pocas pulgadas del suelo  Realice 3 series de 10  Repita en el otro lado  · Elevación recta de la pierna: Acuéstese en el piso boca abajo  Descanse la frente [employer] brazos cruzados  Mantenga mast cuerpo derecho  [place] los [employer] de mast cadera [address], apriete los músculos de los glúteos y de los muslos de la pierna lesionada  Mantenga liz pierna recta y elévela hacia el techo tan alto delfina pueda  Sostenga está posición por 5 segundos  Despacio regrese a la posición inicial  Realice 3 series de 10  Repita en el otro lado  · [place] a la mitad: Párese con los pies separados a distancia del hombro  Descanse edwige xavier sobre de edwige muslos o estírelas chad de usted  Usted podría sostenerse en el respaldo liz silla o contra liz pared para mantener el equilibrio  Mantenga el pecho levantado y baje edwige caderas aproximadamente 10 pulgadas, delfina si se fuera a sentar  Asegúrese de que mast peso esté [employer] [place], y sostenga por 5 segundos  Mantenga mast peso en los talones y párese lentamente  Realice 3 series de 10  Acuda a edwige consultas de control con mast médico según le indicaron  Anote edwige preguntas para que se acuerde de hacerlas kyle edwige visitas    © Copyright [address] [facility] Information is for [employer] use only and may not be sold, redistributed or otherwise used for commercial purposes  All illustrations and images included in CareNotes® are the copyrighted property of A D A M , Inc  or [address] [place] [address] es sólo para uso en educación  Mast intención no es darle un consejo médico sobre enfermedades o tratamientos  Colsulte con mast Eveleth Elisabet farmacéutico antes de seguir cualquier régimen médico para saber si es seguro y efectivo para usted

## 2021-07-13 NOTE — ASSESSMENT & PLAN NOTE
Leg raise test neg for sciatica back pain  Add'l hip tests elicit posterior thigh pain with radiation to buttocks  Differentials include bursitis, arthritis, greater trochanteric pain syndrome  Pt recently started new job involving a lot walking   -Advised to wear proper footwear for work    -Advised that in all conditions involving musculoskeletal pain, PT is recommended  -Ibuprofen 800 prn, robaxin in evening or HS  -handout on exercises  -pt to f/u if pain fails to improve

## 2021-07-13 NOTE — PROGRESS NOTES
Assessment/Plan:    Problem List Items Addressed This Visit        Respiratory    Asthma     Improved since starting Advair but explained to patient he needs the maintenance inhaler BID every day, not just when he feels SOB  Explained this is needed for prevention of symptoms  Pt acknowledges understanding  Relevant Medications    montelukast (SINGULAIR) 10 mg tablet    fluticasone-salmeterol (Advair Diskus) 250-50 mcg/dose inhaler       Cardiovascular and Mediastinum    Hypertension     Stable  Continue current medication regimen  Will reassess at next scheduled follow-up  Relevant Medications    metoprolol tartrate (LOPRESSOR) 50 mg tablet    amLODIPine (NORVASC) 5 mg tablet       Musculoskeletal and Integument    Hamstring injury, right, initial encounter - Primary     Leg raise test neg for sciatica back pain  Add'l hip tests elicit posterior thigh pain with radiation to buttocks  Differentials include bursitis, arthritis, greater trochanteric pain syndrome  Pt recently started new job involving a lot walking   -Advised to wear proper footwear for work  -Advised that in all conditions involving musculoskeletal pain, PT is recommended  -Ibuprofen 800 prn, robaxin in evening or HS  -handout on exercises  -pt to f/u if pain fails to improve         Relevant Medications    ibuprofen (MOTRIN) 800 mg tablet    methocarbamol (ROBAXIN) 750 mg tablet       Other    Loud snoring     Dx with sleep apnea at sleep medicine clinic, CPAP ordered but pt explains device is still in prior auth process  Did lose approx 5 lbs, patient cont to diet and exercise  Return in about 3 months (around 10/13/2021) for Recheck asthma, htn  A chart review was performed and previous primary care visit notes were reviewed  All applicable imaging studies were reviewed and images were reviewed personally  All applicable laboratory studies were reviewed personally    Care everywhere review was performed if available and all pertinent notes were reviewed  Subjective:     HPI: Derrick Macdonald is a 60 y o  male who  has a past medical history of Hyperlipidemia and Hypertension  who presented to the office today for right hip pain  Patient explains that 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  He states years ago he had lower back pain with radiculopathy down his left hip and left leg  He states he went to physical therapy and that has since become manageable and on most days not a problem  He does state that now he has this right hip pain, it is not associated with lower back pain  He states this pain sometimes radiates to the glute and down his thigh  He denies any injury or fall  He denies any swelling overlying skin changes or bony tenderness  He also does speak to his recent sleep study which revealed that he has sleep apnea  A CPAP machine was ordered but he states it is going through a prior authorization process  He does state that he has been eating healthier and losing weight her my recommendations  He has no other concerns at this time      The following portions of the patient's history were reviewed and updated as appropriate: allergies, current medications, past family history, past medical history, past social history, past surgical history, and problem list     Current Outpatient Medications on File Prior to Visit   Medication Sig Dispense Refill    albuterol (PROVENTIL HFA,VENTOLIN HFA) 90 mcg/act inhaler Inhale 2 puffs every 6 (six) hours as needed for wheezing or shortness of breath 3 Inhaler 3    atorvastatin (LIPITOR) 40 mg tablet Take 1 tablet (40 mg total) by mouth daily Take 1 Tablet by Mouth once Daily 90 tablet 2    cetirizine (ZyrTEC) 10 mg tablet Take 1 tablet (10 mg total) by mouth daily 30 tablet 1    Diclofenac Sodium (VOLTAREN) 1 % diclofenac 1 % topical gel      Lidocaine Viscous HCl (XYLOCAINE) 2 % mucosal solution Swish and spit 10 mL 4 (four) times a day as needed for mouth pain or discomfort (Patient not taking: Reported on 1/22/2021) 100 mL 0    lisinopril (ZESTRIL) 40 mg tablet Take 1 tablet (40 mg total) by mouth daily Take 1 tablet by mouth once daily  90 tablet 2    meclizine (ANTIVERT) 25 mg tablet Take 2 tablets (50 mg total) by mouth every 12 (twelve) hours as needed for dizziness (Patient not taking: Reported on 1/22/2021) 30 tablet 0    tadalafil (CIALIS) 5 MG tablet Take 1 tablet (5 mg total) by mouth daily as needed for erectile dysfunction 10 tablet 0    triamcinolone (KENALOG) 0 1 % cream Apply topically 2 (two) times a day 30 g 0    [DISCONTINUED] amLODIPine (NORVASC) 5 mg tablet Take 1 tablet (5 mg total) by mouth daily 90 tablet 3    [DISCONTINUED] fluticasone-salmeterol (Advair Diskus) 250-50 mcg/dose inhaler Inhale 1 puff 2 (two) times a day Rinse mouth after use  3 Inhaler 3    [DISCONTINUED] metoprolol tartrate (LOPRESSOR) 50 mg tablet Take 1 tablet (50 mg total) by mouth every 12 (twelve) hours 90 tablet 2    [DISCONTINUED] montelukast (SINGULAIR) 10 mg tablet Take 1 tablet (10 mg total) by mouth daily at bedtime 90 tablet 3     No current facility-administered medications on file prior to visit  Review of Systems   Constitutional: Negative for activity change, chills, fatigue, fever and unexpected weight change  HENT: Negative for congestion, ear pain, hearing loss, rhinorrhea, sinus pressure and sore throat  Eyes: Negative for pain and visual disturbance  Respiratory: Positive for shortness of breath and wheezing  Negative for cough  Cardiovascular: Negative for chest pain, palpitations and leg swelling  Gastrointestinal: Negative for abdominal pain, nausea and vomiting  Genitourinary: Negative for dysuria and hematuria  Musculoskeletal: Negative for arthralgias, back pain, gait problem, joint swelling and myalgias  R hip pain   Skin: Negative for color change and rash  Neurological: Negative for dizziness, tremors, seizures, syncope, weakness, light-headedness and headaches  Psychiatric/Behavioral: Negative for agitation, behavioral problems, confusion, dysphoric mood, self-injury, sleep disturbance and suicidal ideas  The patient is not nervous/anxious  All other systems reviewed and are negative  Objective:    /86 (BP Location: Left arm, Patient Position: Sitting, Cuff Size: Standard)   Pulse 83   Temp 97 6 °F (36 4 °C) (Temporal)   Resp 18   Ht 5' 6" (1 676 m)   Wt 88 9 kg (196 lb)   SpO2 95%   BMI 31 64 kg/m²     Physical Exam  Constitutional:       Appearance: Normal appearance  He is normal weight  HENT:      Head: Normocephalic  Right Ear: Tympanic membrane, ear canal and external ear normal  There is no impacted cerumen  Left Ear: Tympanic membrane, ear canal and external ear normal  There is no impacted cerumen  Nose: Nose normal       Mouth/Throat:      Mouth: Mucous membranes are moist    Eyes:      Extraocular Movements: Extraocular movements intact  Pupils: Pupils are equal, round, and reactive to light  Cardiovascular:      Rate and Rhythm: Normal rate and regular rhythm  Pulses: Normal pulses  Heart sounds: Normal heart sounds  Pulmonary:      Effort: Pulmonary effort is normal       Breath sounds: Normal breath sounds  Abdominal:      General: Bowel sounds are normal       Palpations: Abdomen is soft  Musculoskeletal:         General: No signs of injury  Normal range of motion  Cervical back: Normal range of motion  Right hip: Tenderness present  Right upper leg: Normal  No swelling, edema or deformity  Right lower leg: No edema  Left lower leg: No edema  Comments: Posterior thigh pain on straight leg raise test    Skin:     General: Skin is warm and dry  Capillary Refill: Capillary refill takes less than 2 seconds  Findings: No bruising, lesion or rash  Neurological:      General: No focal deficit present  Mental Status: He is alert and oriented to person, place, and time  Psychiatric:         Mood and Affect: Mood normal          Behavior: Behavior normal          Thought Content: Thought content normal          MAREN Og  07/13/21  12:20 PM    Patient Instructions     Ejercicios para la bursitis de cadera   CUIDADO AMBULATORIO:   Los ejercicios para la bursitis de cadera ayudan a fortalecer los músculos de la cadera y a mantener la articulación flexible  Los músculos delaney pueden ayudar a reducir el dolor, prevenir lesiones y mantener la articulación estable  Los ejercicios también pueden ayudar para aumentar el rango de movimiento de la articulación de la cadera  Lo que usted necesita saber acerca de la seguridad de los ejercicios:  · Muévase lenta y suavemente  Evite movimientos rápidos o bruscos  [place] ayudará a evitar liz lesión  · Respire normalmente  No contenga la respiración  Es importante inspirar y exhalar para no tensionarse kyle el ejercicio  La tensión le podría impedir  la articulación en un rango completo de movimiento  · Realice los ejercicios y el estiramiento con las dos piernas  Ottoniel esto para que ambas caderas se mantengan delaney y flexibles  · Deténgase si siente dolor luis o aumento del dolor  Comuníquese con mast médico o fisioterapeuta  Es normal que sienta cierta molestia kyle el ejercicio  Practicar los ejercicios con regularidad ayudará a disminuir mast incomodidad con el paso del [place]  · Entre en calor y estírese antes de hacer ejercicio  Camine o jimbo en bicicleta fija kyle 5 a 10 minutos  Cómo hacer estiramientos de cadera: Mast médico o fisioterapeuta le indicará cuántas veces debe hacer cada estiramiento  Ottoniel el estiramiento en ambos lados antes de pasar al siguiente estiramiento  · Estiramiento de arana iliotibial de pie: Párese con la pierna lesionada detrás de la otra pierna  Dóblese hacia el lado que no está lesionado  Deténgase cuando sienta un estiramiento en la parte externa de mast cadera  Mantenga esta posición por 5 a 10 segundos  Luego vuelva a la posición inicial          · Estiramiento de la arana iliotibial recostado: Acuéstese boca arriba  Doble la rodilla del [address]  Colóquese las [employer] parte exterior de la rodilla y [place]  Tire lentamente la rodilla atravesada sobre mast cuerpo  Deténgase cuando sienta un estiramiento en mast cadera y en la parte exterior del muslo  Mantenga esta posición por 5 a 10 segundos  Regrese mast pierna a la posición inicial          · Estiramiento de cadera: Acuéstese boca arriba con ambas piernas estiradas y en el suelo  Doble la rodilla del lado lesionado hacia el pecho hasta que pueda alcanzar la parte inferior de la pierna  Coloque ambas xavier sobre la espinilla y tire de la [place]  Mantenga esta posición por 5 a 10 segundos  Regrese mast pierna a la posición inicial          · Rodillas al pecho: Acuéstese boca arriba con ambas rodillas dobladas y edwige pies planos sobre el piso  Doble la rodilla del lado lesionado hacia el pecho hasta que pueda alcanzar la parte inferior de la pierna  Coloque ambas xavier sobre la espinilla y tire de la [place]  Mantenga esta posición por 5 a 10 segundos  Regrese mast pierna a la posición inicial          · Estiramiento interno del rotador de cadera: Para nikkie ejercicio, debe acostarse sobre liz pulliam  Acuéstese de lado con la cadera lesionada en la parte superior  Es posible que le indiquen que coloque un almohadón [employer]  Mueva la pierna superior de modo que el pie cuelgue por debajo del borde de la pulliam  Gire la cadera para levantar el pie en la dirección opuesta al hombro inferior  Levante el pie tan alto delfina pueda para sentir un estiramiento en la parte posterior del muslo  Sostenga está posición por 5 segundos   Luego baje lentamente el pie a la posición de elia  · Estiramiento externo del rotador de cadera: Para nikkie ejercicio, debe acostarse sobre liz pulliam  Acuéstese de lado con la cadera lesionada en la parte inferior  No necesita un almohadón entre los muslos para nikkie ejercicio  Mueva la pierna inferior de modo que el pie cuelgue del borde de la pulliam  Gire la cadera para levantar el pie en la dirección opuesta al hombro inferior  Levante el pie lo más alto que pueda para sentir un estiramiento en el glúteo  Sostenga está posición por 5 segundos  Luego baje lentamente el pie a la posición de elia  · Estiramiento del flexor de la cadera de rodillas: Arrodíllese sobre mast rodilla en el lado lesionado  Coloque el pie de la otra pierna en el piso para que la rodilla esté doblada  [place] ambas xavier [employer]  Mantenga la [place] y los músculos abdominales tensos  Inclínese hacia adelante hasta que sienta un estiramiento en el otro muslo  Sostenga el estiramiento por 10 segundos  Regrese a la posición original de pie  Cómo realizar los ejercicios de estiramiento de cadera: Mast médico o fisioterapeuta le indicará cuántas veces debe hacer cada ejercicio  Ottoniel el ejercicio en ambos lados antes de pasar al siguiente ejercicio  · Levantamiento de la pierna recta hacia un lado: [place] ejercicio también se llama abducción de cadera  Acuéstese de lado con las piernas rectas, con la cadera lesionada en la parte superior  Lentamente levante la pierna superior hacia el techo tan alto delfina pueda  Mantenga mast pie apuntando  Sostenga está posición por 5 segundos  Luego baje lentamente la pierna a la posición de elia  · Elevación de la peter interna del muslo: [place] ejercicio también se llama aducción de cadera  Acuéstese de lado con las piernas rectas, con la cadera lesionada en la parte inferior  Cruce la pierna superior sobre la pierna inferior  Ponga el pie de mast pierna superior en el piso frente a usted   Eleve la pierna inferior hasta que toque la otra pierna  Sostenga está posición por 5 segundos  Luego baje lentamente la pierna al piso  · Ejercicio de almeja: Acuéstese de lado, de manera que el lado lesionado quede en la parte superior  Doble las rodillas  Mantenga los talones juntos kyle nikkie ejercicio  Lentamente levante la rodilla superior hacia el techo  Luego baje la pierna [employer] las Earna [place]rows a juntarse  Llame a mast médico si:  · Tiene dolor luis cuando hace ejercicio o está en reposo  · Tiene preguntas o inquietudes acerca de los estiramientos o ejercicios  © [employer] Information is for End User's use only and may not be sold, redistributed or otherwise used for commercial purposes  All illustrations and images included in CareNotes® are the copyrighted property of [employer] A [employer]  or [address] [place] [address] es sólo para uso en educación  Mast intención no es darle un consejo médico sobre enfermedades o tratamientos  Colsulte con mast Melven Rimes farmacéutico antes de seguir cualquier régimen médico para saber si es seguro y efectivo para usted  Ejercicios para los isquiotibiales   LO QUE NECESITA SABER:   Los ejercicios para los músculos isquiotibiales ayudan a fortalecer y estirar los músculos que soportan la charlie lumbar, las caderas y la rodilla  [place] disminuye el dolor, mejora la circulación y disminuye el riesgo de lesiones en el futuro  INSTRUCCIONES SOBRE EL GALE HOSPITALARIA:   Comuníquese con mast médico si:  · Tiene dolor luis o creciente cuando hace ejercico o está en reposo  · Usted tiene preguntas o inquietudes acerca de mast condición, cuidado o programa de ejercicios  Nell Sins de manera darling:  · Muévase lenta y suavemente  Evite movimientos rápidos o bruscos  [place] ayudará a evitar otra lesión  · Respire normalmente  No contenga la respiración  Es importante inspirar y exhalar para no tensionarse kyle el ejercicio   Alanis Davila podría evitar que los músculos se estiren  · Realice los ejercicios y el estiramiento con las dos piernas  Ottoniel esto para que los músculos de ambas piernas sigan siendo delaney y flexibles  · Deténgase si siente dolor luis o aumento del dolor  Suspenda el ejercicio y póngase en contacto con mast médico si se presentan estos síntomas  Es normal sentir [place], delfina dolor sordo, kyle el ejercicio  Practicar los ejercicios con regularidad ayudará a disminuir mast incomodidad con el paso del [place]  · Entre en calor y estírese antes de hacer ejercicio  [place] ayudará a evitar liz lesión  Camine o jimbo en bicicleta fija kyle 5 a 10 minutos  Cómo realizar los ejercicios de estiramiento: Pregunte a mast médico con qué frecuencia hacerlos  · Estiramiento de los isquiotibiales con Duane Edgar toalla: Acuéstese sobre mast espalda en el piso  Doble ambas piernas para que edwige pies descansen planos  Levante liz pierna del suelo y enrede liz toalla en mast pie  Agarre los extremos de la toalla y enderece lentamente la pierna que tiene [place]  Use liz toalla para jalar suavemente mast pierna hacia usted hasta que sienta que se estire  Mantenga la pierna derecha y mast pie flexionado hacia mast cuerpo  Sostenga está posición por 30 segundos  Use liz toalla más larga si es necesario  · Sentado, estiramiento del tendón de la corva, parte posterior del muslo: Siéntese en el piso con las piernas enfrente suyo  No apunte con los dedos de edwige pies o flexione edwige pies  Coloque las milo de edwige xavier en el piso y deslice edwige xavier hacia adelante hasta que usted sienta liz resistencia o un estiramiento leve  Mantenga mast [place] y no bloquee edwige rodillas  Sostenga el estiramiento por 30 segundos  · [employer], estiramiento de los músculos isquiotibiales: Párese con los pies separados a distancia del ancho de caderas  Levante liz pierna y apóyela sobre liz superficie firme, delfina liz pulliam o liz silla   Jojo Heap dedos del pie apuntando [place]  Deslice las xavier hacia abajo por el costado de la pierna hasta sentir un estiramiento  Mantenga el pecho levantado y la espalda recta  Sostenga está posición por 30 segundos  · Estiramiento de sentado con abertura de piernas: Siéntese en el piso y extienda las piernas tan ampliamente delfina sea posible  Mantenga las piernas rectas e inclínese sobre liz pierna  Deslice las xavier hacia adelante hasta que sienta un estiramiento  Mantenga el pecho levantado y la espalda recta  Sostenga está posición por 30 segundos  Cómo realizar ejercicios de fortalecimiento: Siempre sarai ejercicios de fortalecimiento después de estirar  A medida que se fortalece, mast médico le dirá que agregue peso o más repeticiones a los ejercicios de fortalecimiento  · Encorvamiento de los isquiotibiales: Coloque mast mano en liz pared o detrás de liz silla para equilibrarse  Coloque el peso en liz de edwige piernas  Levante la otra pierna y eleve mast talón hacia edwige glúteos  Sostenga está posición por 5 segundos  Lentamente baje la pierna hasta que quede a unas pocas pulgadas del suelo  Realice 3 series de 10  Repita en el otro lado  · Elevación recta de la pierna: Acuéstese en el piso boca abajo  Descanse la frente [employer] brazos cruzados  Mantenga mast cuerpo derecho  Apoye los [employer] de mast cadera [address], apriete los músculos de los glúteos y de los muslos de la pierna lesionada  Mantenga liz pierna recta y elévela hacia el techo tan alto delfina pueda  Sostenga está posición por 5 segundos  Despacio regrese a la posición inicial  Realice 3 series de 10  Repita en el otro lado  · [place] a la mitad: Párese con los pies separados a distancia del hombro  Descanse edwige xavier sobre de edwige muslos o estírelas chad de usted  Usted podría sostenerse en el respaldo liz silla o contra liz pared para mantener el equilibrio   Mantenga el pecho levantado y baje edwige caderas aproximadamente 10 pulgadas, delfina si se fuera a sentar  Asegúrese de que mast peso esté [address] [facility], y sostenga por 5 segundos  Mantenga mast peso en los talones y párese lentamente  Realice 3 series de 10  Acuda a edwige consultas de control con mast médico según le indicaron  Anote edwige preguntas para que se acuerde de hacerlas kyle edwige visitas  © [facility] [address] Information is for End User's use only and may not be sold, redistributed or otherwise used for commercial purposes  All illustrations and images included in CareNotes® are the copyrighted property of A D A [employer]  or [address] [place] [address] es sólo para uso en educación  Mast intención no es darle un consejo médico sobre enfermedades o tratamientos  Colsulte con mast Taj Lisandro farmacéutico antes de seguir cualquier régimen médico para saber si es seguro y efectivo para usted

## 2021-10-13 ENCOUNTER — OFFICE VISIT (OUTPATIENT)
Dept: FAMILY MEDICINE CLINIC | Facility: CLINIC | Age: 56
End: 2021-10-13

## 2021-10-13 VITALS
SYSTOLIC BLOOD PRESSURE: 128 MMHG | TEMPERATURE: 97.6 F | OXYGEN SATURATION: 98 % | DIASTOLIC BLOOD PRESSURE: 86 MMHG | HEIGHT: 66 IN | RESPIRATION RATE: 18 BRPM | BODY MASS INDEX: 31.82 KG/M2 | HEART RATE: 81 BPM | WEIGHT: 198 LBS

## 2021-10-13 DIAGNOSIS — R21 RASH: ICD-10-CM

## 2021-10-13 DIAGNOSIS — R07.9 CHEST PAIN, EXERTIONAL: Primary | ICD-10-CM

## 2021-10-13 DIAGNOSIS — R06.83 LOUD SNORING: ICD-10-CM

## 2021-10-13 DIAGNOSIS — I10 HYPERTENSION, UNSPECIFIED TYPE: ICD-10-CM

## 2021-10-13 DIAGNOSIS — F51.01 PRIMARY INSOMNIA: ICD-10-CM

## 2021-10-13 DIAGNOSIS — J45.20 MILD INTERMITTENT ASTHMA, UNSPECIFIED WHETHER COMPLICATED: ICD-10-CM

## 2021-10-13 PROCEDURE — 3074F SYST BP LT 130 MM HG: CPT | Performed by: NURSE PRACTITIONER

## 2021-10-13 PROCEDURE — 3079F DIAST BP 80-89 MM HG: CPT | Performed by: NURSE PRACTITIONER

## 2021-10-13 PROCEDURE — 99214 OFFICE O/P EST MOD 30 MIN: CPT | Performed by: NURSE PRACTITIONER

## 2021-10-13 RX ORDER — METOPROLOL TARTRATE 50 MG/1
50 TABLET, FILM COATED ORAL EVERY 12 HOURS SCHEDULED
Qty: 90 TABLET | Refills: 2 | Status: SHIPPED | OUTPATIENT
Start: 2021-10-13 | End: 2022-05-09 | Stop reason: SDUPTHER

## 2021-10-13 RX ORDER — LISINOPRIL 40 MG/1
40 TABLET ORAL DAILY
Qty: 90 TABLET | Refills: 2 | Status: SHIPPED | OUTPATIENT
Start: 2021-10-13 | End: 2022-06-16 | Stop reason: SDUPTHER

## 2021-10-13 RX ORDER — MONTELUKAST SODIUM 10 MG/1
10 TABLET ORAL
Qty: 90 TABLET | Refills: 3 | Status: SHIPPED | OUTPATIENT
Start: 2021-10-13

## 2021-10-13 RX ORDER — TRIAMCINOLONE ACETONIDE 1 MG/G
CREAM TOPICAL 2 TIMES DAILY
Qty: 30 G | Refills: 0 | Status: SHIPPED | OUTPATIENT
Start: 2021-10-13

## 2021-10-13 RX ORDER — QUETIAPINE FUMARATE 50 MG/1
50 TABLET, FILM COATED ORAL
Qty: 30 TABLET | Refills: 3 | Status: SHIPPED | OUTPATIENT
Start: 2021-10-13

## 2021-10-14 PROBLEM — R07.9 CHEST PAIN, EXERTIONAL: Status: ACTIVE | Noted: 2021-10-14

## 2021-10-26 ENCOUNTER — TELEPHONE (OUTPATIENT)
Dept: PULMONOLOGY | Facility: CLINIC | Age: 56
End: 2021-10-26

## 2022-03-16 DIAGNOSIS — E78.5 HYPERLIPIDEMIA, UNSPECIFIED HYPERLIPIDEMIA TYPE: ICD-10-CM

## 2022-03-16 DIAGNOSIS — J45.20 MILD INTERMITTENT ASTHMA, UNSPECIFIED WHETHER COMPLICATED: ICD-10-CM

## 2022-03-16 RX ORDER — ATORVASTATIN CALCIUM 40 MG/1
TABLET, FILM COATED ORAL
Qty: 30 TABLET | Refills: 2 | Status: SHIPPED | OUTPATIENT
Start: 2022-03-16 | End: 2022-05-09 | Stop reason: SDUPTHER

## 2022-03-16 RX ORDER — ALBUTEROL SULFATE 90 UG/1
AEROSOL, METERED RESPIRATORY (INHALATION)
Qty: 18 G | Refills: 2 | Status: SHIPPED | OUTPATIENT
Start: 2022-03-16 | End: 2022-06-16 | Stop reason: SDUPTHER

## 2022-05-09 ENCOUNTER — TELEPHONE (OUTPATIENT)
Dept: FAMILY MEDICINE CLINIC | Facility: CLINIC | Age: 57
End: 2022-05-09

## 2022-05-09 ENCOUNTER — OFFICE VISIT (OUTPATIENT)
Dept: FAMILY MEDICINE CLINIC | Facility: CLINIC | Age: 57
End: 2022-05-09

## 2022-05-09 VITALS
TEMPERATURE: 96.6 F | SYSTOLIC BLOOD PRESSURE: 142 MMHG | BODY MASS INDEX: 33.33 KG/M2 | WEIGHT: 207.4 LBS | RESPIRATION RATE: 18 BRPM | OXYGEN SATURATION: 99 % | HEIGHT: 66 IN | HEART RATE: 91 BPM | DIASTOLIC BLOOD PRESSURE: 88 MMHG

## 2022-05-09 DIAGNOSIS — I10 HYPERTENSION, UNSPECIFIED TYPE: ICD-10-CM

## 2022-05-09 DIAGNOSIS — Z99.89 CPAP (CONTINUOUS POSITIVE AIRWAY PRESSURE) DEPENDENCE: Primary | ICD-10-CM

## 2022-05-09 DIAGNOSIS — E78.5 HYPERLIPIDEMIA, UNSPECIFIED HYPERLIPIDEMIA TYPE: ICD-10-CM

## 2022-05-09 DIAGNOSIS — J45.20 MILD INTERMITTENT ASTHMA, UNSPECIFIED WHETHER COMPLICATED: ICD-10-CM

## 2022-05-09 PROCEDURE — 99214 OFFICE O/P EST MOD 30 MIN: CPT | Performed by: NURSE PRACTITIONER

## 2022-05-09 PROCEDURE — 3079F DIAST BP 80-89 MM HG: CPT | Performed by: NURSE PRACTITIONER

## 2022-05-09 PROCEDURE — 3077F SYST BP >= 140 MM HG: CPT | Performed by: NURSE PRACTITIONER

## 2022-05-09 RX ORDER — FLUTICASONE PROPIONATE AND SALMETEROL XINAFOATE 230; 21 UG/1; UG/1
2 AEROSOL, METERED RESPIRATORY (INHALATION) 2 TIMES DAILY
Qty: 12 G | Refills: 2 | Status: SHIPPED | OUTPATIENT
Start: 2022-05-09

## 2022-05-09 RX ORDER — METOPROLOL TARTRATE 50 MG/1
50 TABLET, FILM COATED ORAL EVERY 12 HOURS SCHEDULED
Qty: 90 TABLET | Refills: 2 | Status: SHIPPED | OUTPATIENT
Start: 2022-05-09

## 2022-05-09 RX ORDER — AMLODIPINE BESYLATE 5 MG/1
5 TABLET ORAL DAILY
Qty: 90 TABLET | Refills: 3 | Status: SHIPPED | OUTPATIENT
Start: 2022-05-09 | End: 2022-07-11 | Stop reason: SINTOL

## 2022-05-09 RX ORDER — ATORVASTATIN CALCIUM 40 MG/1
40 TABLET, FILM COATED ORAL DAILY
Qty: 30 TABLET | Refills: 2 | Status: SHIPPED | OUTPATIENT
Start: 2022-05-09

## 2022-05-09 NOTE — PATIENT INSTRUCTIONS
Prediabetes   LO QUE NECESITA SABER:   ¿Qué es la prediabetes? La prediabetes es un nivel de glucosa (azúcar) en la santi que es más alto de lo normal  No es lo suficientemente alto para ser considerado diabetes  La prediabetes aumenta el riesgo de padecer diabetes de tipo 2 y enfermedades cardíacas, especialmente si padece hipertensión arterial o colesterol alto  ¿Qué aumenta mi riesgo de prediabetes? · Tener sobrepeso u obesidad, con un índice de masa corporal ([address]) de 22 o más (21 o más si es asiático-americano)    · Falta de actividad física    · Edad avanzada    · Antecedentes familiares de diabetes (padres o hermanos)    · Antecedentes de enfermedad cardíaca, diabetes gestacional o síndrome de ovario poliquístico (SOPQ)    · Presión arterial alexander o niveles altos de colesterol    · Ser afroamericano, , nativo americano, asiático-americano o de las andrade del [facility]    · En los niños, tener liz madre con diabetes o diabetes gestacional (DG) kyle el embarazo    ¿Cuáles son los signos y síntomas de la prediabetes? Es posible que usted no presente ningún síntoma  ¿Cómo se diagnostica la prediabetes? Los análisis de santi pueden detectar la prediabetes aunque no tenga signos o síntomas  Los adultos se someten a pruebas a partir de los [place], o a los 28 si tienen un alto riesgo de diabetes  Los niños con sobrepeso u obesidad son examinados al comienzo de la pubertad, o tan pronto delfina a los 10 años de [place]  ¿Cómo prevenir o retrasar la diabetes tipo 2? Las opciones saludables funcionan mejor para retrasar o prevenir la diabetes tipo 2  Es posible que mast médico le dé las siguientes pautas:  · Realice actividad física regularmente  Los adultos deben hacer al menos 150 minutos (2 5 horas) de actividad física moderada cada semana  Reparta la cantidad de actividad kyle al menos 3 días a la semana  No deje de realizarla kyle más de 2 días seguidos   Los niños deben hacer al menos 60 minutos de actividad física moderada la mayoría de los días de la [place]  Ejemplos de actividad física moderada incluyen caminar a paso ligero, correr y nadar  No permanezca sentada por más de 30 minutos cada vez  Colabore con mast médico para crear un plan de [employer]  · Baje de peso si usted tiene sobrepeso  Liz pérdida de peso del 7% de mast peso corporal puede ayudar  Mast médico puede indicarle cuál es el peso saludable para usted  Puede ayudarlo a crear un plan para perder peso  · Consuma alimentos saludables  Consuma liz variedad de frutas y vegetales  Consuma alimentos de grano integral con más frecuencia  Escoja productos lácteos, edgard y otras proteínas que kristen bajas en grasa  Consuma pocos dulces delfina caramelos, galletas, sodas regulares y bebidas azucaradas  Usted también puede disminuir las calorías comiendo porciones pequeñas  Trabaje junto con mast médico o dietista para desarrollar un plan de alimentación adecuado para usted  · Medtronic delfina se le haya indicado  Mast médico puede indicarle medicamentos para la diabetes si usted está en alto riesgo de tener diabetes  También puede necesitar medicamentos para la hipertensión y para el colesterol alto  · Acuda a edwige consultas de control con mast médico según le indicaron  Usted necesitará regresar cada año para que le realicen pruebas de diabetes  · No fume  No use cigarrillos electrónicos o tabaco sin humo en vez de cigarrillos o para tratar de dejar de fumar  Todos estos aún contienen nicotina  Pida a mast médico información si usted fuma actualmente y [place] para dejar de hacerlo  · Empiece con objetivos pequeños y [address]  Es posible que tenga que [address] con 3 amalia de [employer]  Puede añadir un día después de 3 semanas de actividad más o Rani  Ponga delfina objetivo perder 5 libras al principio  Cambie el postre o los dulces por liz Luciano Cameron  Empiece con 2 frutas a la semana y [employer]   Estas son sugerencias  Hable con edwige médicos sobre preparar un plan adecuado para usted  ¿Cuándo dustin llamar a mi médico?  · Usted tiene más hambre o sed de lo usual     · Usted está orinando con más frecuencia de lo normal     · Siempre está exhausto  · Usted tiene visión borrosa  · Usted tiene preguntas o inquietudes acerca de mast condición o cuidado  ACUERDOS SOBRE MAST CUIDADO:   Usted tiene el derecho de ayudar a planear mast cuidado  Aprenda todo lo que pueda sobre mast condición y delfina darle tratamiento  Discuta edwige opciones de tratamiento con edwige médicos para decidir el cuidado que usted desea recibir  Usted siempre tiene el derecho de rechazar el tratamiento  Esta información es sólo para uso en educación  Mast intención no es darle un consejo médico sobre enfermedades o tratamientos  Colsulte con mast Myrtle Clipper farmacéutico antes de seguir cualquier régimen médico para saber si es seguro y efectivo para usted  © Copyright [employer] 2022 Information is for End User's use only and may not be sold, redistributed or otherwise used for commercial purposes   All illustrations and images included in CareNotes® are the copyrighted property of A D A M , Inc  or [address] [place] [address]

## 2022-05-10 ENCOUNTER — TELEPHONE (OUTPATIENT)
Dept: FAMILY MEDICINE CLINIC | Facility: CLINIC | Age: 57
End: 2022-05-10

## 2022-05-10 NOTE — ASSESSMENT & PLAN NOTE
· States tubing and connection to mask have black substance, had tried to clean without success  · Ordered new supplies - our nursing staff will fax to medical supplier

## 2022-05-10 NOTE — ASSESSMENT & PLAN NOTE
· Cont Advair, singulair, and albuterol    · Still experiences dyspnea on exertion  · Stress test ordered but he didn't schedule this

## 2022-05-10 NOTE — ASSESSMENT & PLAN NOTE
Previously well controlled  Above goal today  May be related to recent 9 pound weight gain  No changes to meds today, advised aggressive lifestyle change by increasing moderate physical activity and reducing salt, carbs, fat in diet

## 2022-05-10 NOTE — TELEPHONE ENCOUNTER
PATIENT RETURNED CALL HE WILL CALL BACK TOMORROW AND GIVE INFORMATION ABOUT WHERE HE GOT THE CPAP MACHINE

## 2022-05-10 NOTE — TELEPHONE ENCOUNTER
Received script in my DME bin for replacement CPAP supplies  We would need to know where the pt got his machine from to be able to send script to that supplier

## 2022-05-10 NOTE — PROGRESS NOTES
Assessment/Plan:    Problem List Items Addressed This Visit        Respiratory    Asthma     · Cont Advair, singulair, and albuterol  · Still experiences dyspnea on exertion  · Stress test ordered but he didn't schedule this         Relevant Medications    fluticasone-salmeterol (Advair HFA) 230-21 MCG/ACT inhaler       Cardiovascular and Mediastinum    Hypertension     Previously well controlled  Above goal today  May be related to recent 9 pound weight gain  No changes to meds today, advised aggressive lifestyle change by increasing moderate physical activity and reducing salt, carbs, fat in diet  Relevant Medications    metoprolol tartrate (LOPRESSOR) 50 mg tablet    amLODIPine (NORVASC) 5 mg tablet       Other    CPAP (continuous positive airway pressure) dependence - Primary     · States tubing and connection to mask have black substance, had tried to clean without success  · Ordered new supplies - our nursing staff will fax to medical supplier  Relevant Orders    PAP DME Resupply/Reorder      Other Visit Diagnoses     Hyperlipidemia, unspecified hyperlipidemia type        Relevant Medications    atorvastatin (LIPITOR) 40 mg tablet            Return in about 3 months (around 8/9/2022) for Recheck prediabetes  A chart review was performed and previous primary care visit notes were reviewed  All applicable imaging studies were reviewed and images were reviewed personally  All applicable laboratory studies were reviewed personally  Care everywhere review was performed if  available and all pertinent notes were reviewed  Subjective:     HPI: Yury Hayden is a 60 y o  male who  has a past medical history of Hyperlipidemia and Hypertension  who presented to the office today for follow up  He is dismayed he has not been able to find permanent work, 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  For this reason he states he is also gained the 9-10 lb as noted on his chart    He did not schedule the stress test for dyspnea on exertion  This may be related to his asthma which was uncontrolled because he was not taking his Advair regularly 2 puffs 2 times a day  He does state however this is now more controlled as he has been using this more often  Also states he has tried multiple times to get social security but was denied  The following portions of the patient's history were reviewed and updated as appropriate: allergies, current medications, past family history, past medical history, past social history, past surgical history, and problem list     Current Outpatient Medications on File Prior to Visit   Medication Sig Dispense Refill    albuterol (PROVENTIL HFA,VENTOLIN HFA) 90 mcg/act inhaler 1 PUFF EVERY 6 HOURS 18 g 2    cetirizine (ZyrTEC) 10 mg tablet Take 1 tablet (10 mg total) by mouth daily 30 tablet 1    Diclofenac Sodium (VOLTAREN) 1 % diclofenac 1 % topical gel      ibuprofen (MOTRIN) 800 mg tablet Take 1 tablet (800 mg total) by mouth every 6 (six) hours as needed for moderate pain 30 tablet 0    Lidocaine Viscous HCl (XYLOCAINE) 2 % mucosal solution Swish and spit 10 mL 4 (four) times a day as needed for mouth pain or discomfort (Patient not taking: Reported on 1/22/2021) 100 mL 0    lisinopril (ZESTRIL) 40 mg tablet Take 1 tablet (40 mg total) by mouth daily Take 1 tablet by mouth once daily   90 tablet 2    meclizine (ANTIVERT) 25 mg tablet Take 2 tablets (50 mg total) by mouth every 12 (twelve) hours as needed for dizziness (Patient not taking: Reported on 1/22/2021) 30 tablet 0    methocarbamol (ROBAXIN) 750 mg tablet Take 1 tablet (750 mg total) by mouth 2 (two) times a day as needed for muscle spasms 60 tablet 1    montelukast (SINGULAIR) 10 mg tablet Take 1 tablet (10 mg total) by mouth daily at bedtime 90 tablet 3    QUEtiapine (SEROquel) 50 mg tablet Take 1 tablet (50 mg total) by mouth daily at bedtime 30 tablet 3    tadalafil (CIALIS) 5 MG tablet Take 1 tablet (5 mg total) by mouth daily as needed for erectile dysfunction 10 tablet 0    triamcinolone (KENALOG) 0 1 % cream Apply topically 2 (two) times a day 30 g 0     No current facility-administered medications on file prior to visit  Review of Systems   Constitutional: Negative  HENT: Negative  Eyes: Negative  Respiratory: Negative  Cardiovascular: Negative  Gastrointestinal: Negative  Genitourinary: Negative  Musculoskeletal: Negative  Neurological: Negative  Psychiatric/Behavioral: Negative  Objective:    /88 (BP Location: Right arm, Patient Position: Sitting, Cuff Size: Standard)   Pulse 91   Temp (!) 96 6 °F (35 9 °C) (Temporal)   Resp 18   Ht 5' 6" (1 676 m)   Wt 94 1 kg (207 lb 6 4 oz)   SpO2 99%   BMI 33 48 kg/m²     Physical Exam  Constitutional:       Appearance: Normal appearance  HENT:      Head: Normocephalic  Right Ear: External ear normal       Left Ear: External ear normal       Nose: Nose normal       Mouth/Throat:      Mouth: Mucous membranes are moist    Eyes:      Extraocular Movements: Extraocular movements intact  Conjunctiva/sclera: Conjunctivae normal    Cardiovascular:      Rate and Rhythm: Normal rate and regular rhythm  Pulses: Normal pulses  Heart sounds: Normal heart sounds  Pulmonary:      Effort: Pulmonary effort is normal       Breath sounds: Normal breath sounds  Musculoskeletal:         General: No tenderness or signs of injury  Normal range of motion  Cervical back: Normal range of motion  Right lower leg: No edema  Left lower leg: No edema  Skin:     General: Skin is warm and dry  Capillary Refill: Capillary refill takes less than 2 seconds  Findings: No bruising, lesion or rash  Neurological:      General: No focal deficit present  Mental Status: He is alert and oriented to person, place, and time     Psychiatric:         Mood and Affect: Mood normal  Behavior: Behavior normal          Thought Content: Thought content normal          DUSTIN CazaresISMAEL  05/10/22  9:46 AM    Patient Instructions     Prediabetes   LO QUE NECESITA SABER:   ¿Qué es la prediabetes? La prediabetes es un nivel de glucosa (azúcar) en la santi que es más alto de lo normal  No es lo suficientemente alto para ser considerado diabetes  La prediabetes aumenta el riesgo de padecer diabetes de tipo 2 y enfermedades cardíacas, especialmente si padece hipertensión arterial o colesterol alto  ¿Qué aumenta mi riesgo de prediabetes? · Tener sobrepeso u obesidad, con un índice de masa corporal ([address]) de 22 o más (21 o más si es asiático-americano)    · Falta de actividad física    · Edad avanzada    · Antecedentes familiares de diabetes (padres o hermanos)    · Antecedentes de enfermedad cardíaca, diabetes gestacional o síndrome de ovario poliquístico (SOPQ)    · Presión arterial alexander o niveles altos de colesterol    · Ser afroamericano, , nativo americano, asiático-americano o de las andrade del [facility]    · En los niños, tener liz madre con diabetes o diabetes gestacional (DG) kyle el embarazo    ¿Cuáles son los signos y síntomas de la prediabetes? Es posible que usted no presente ningún síntoma  ¿Cómo se diagnostica la prediabetes? Los análisis de santi pueden detectar la prediabetes aunque no tenga signos o síntomas  Los adultos se someten a pruebas a partir de los [place], o a los 28 si tienen un alto riesgo de diabetes  Los niños con sobrepeso u obesidad son examinados al comienzo de la pubertad, o tan pronto delfina a los [address] años de [facility]  ¿Cómo prevenir o retrasar la diabetes tipo 2? Las opciones saludables funcionan mejor para retrasar o prevenir la diabetes tipo 2  Es posible que mast médico le dé las siguientes pautas:  · Realice actividad física regularmente  Los adultos deben hacer al menos 150 minutos (2 5 horas) de actividad física moderada cada semana   Reparta la cantidad de actividad kyle al menos 3 días a la semana  No deje de realizarla kyle más de 2 días seguidos  Los niños deben hacer al menos 60 minutos de actividad física moderada la mayoría de los días de la [place]  Ejemplos de actividad física moderada incluyen caminar a paso ligero, correr y nadar  No permanezca sentada por más de 30 minutos cada vez  Colabore con mast médico para crear un plan de [employer]  · Baje de peso si usted tiene sobrepeso  Liz pérdida de peso del 7% de mast peso corporal puede ayudar  Mast médico puede indicarle cuál es el peso saludable para usted  Puede ayudarlo a crear un plan para perder peso  · Consuma alimentos saludables  Consuma liz variedad de frutas y vegetales  Consuma alimentos de grano integral con más frecuencia  Escoja productos lácteos, edgard y otras proteínas que kristen bajas en grasa  Consuma pocos dulces delfina caramelos, galletas, sodas regulares y bebidas azucaradas  Usted también puede disminuir las calorías comiendo porciones pequeñas  Trabaje junto con mast médico o dietista para desarrollar un plan de alimentación adecuado para usted  · [employer] delfina se le haya indicado  Mast médico puede indicarle medicamentos para la diabetes si usted está en alto riesgo de tener diabetes  También puede necesitar medicamentos para la hipertensión y para el colesterol alto  · Acuda a edwige consultas de control con mast médico según le indicaron  Usted necesitará regresar cada año para que le realicen pruebas de diabetes  · No fume  No use cigarrillos electrónicos o tabaco sin humo en vez de cigarrillos o para tratar de dejar de fumar  Todos estos aún contienen nicotina  Pida a mast médico información si usted fuma actualmente y [place] para dejar de hacerlo  · Empiece con objetivos pequeños y [place]  Es posible que tenga que [address] [facility] con 3 amalia de [employer]  Puede añadir un día después de 3 semanas de actividad más o Rani   Amber Crimes objetivo perder 5 libras al principio  Cambie el postre o los dulces por liz Prudy Flaquita  Empiece con 2 frutas a la semana y [employer]  Estas son sugerencias  Hable con edwige médicos sobre preparar un plan adecuado para usted  ¿Cuándo dustin llamar a mi médico?  · Usted tiene más hambre o sed de lo usual     · Usted está orinando con más frecuencia de lo normal     · Siempre está exhausto  · Usted tiene visión borrosa  · Usted tiene preguntas o inquietudes acerca de mast condición o cuidado  ACUERDOS SOBRE MAST CUIDADO:   Usted tiene el derecho de ayudar a planear mast cuidado  Aprenda todo lo que pueda sobre mast condición y delfina darle tratamiento  Discuta edwige opciones de tratamiento con edwige médicos para decidir el cuidado que usted desea recibir  Usted siempre tiene el derecho de rechazar el tratamiento  Esta información es sólo para uso en educación  Mast intención no es darle un consejo médico sobre enfermedades o tratamientos  Colsulte con mast Yeison Linnette farmacéutico antes de seguir cualquier régimen médico para saber si es seguro y efectivo para usted  © Copyright [employer] 2022 Information is for End User's use only and may not be sold, redistributed or otherwise used for commercial purposes   All illustrations and images included in CareNotes® are the copyrighted property of A D A M , Inc  or [address]

## 2022-05-13 NOTE — TELEPHONE ENCOUNTER
Did a chart search and found no details  I did find that he follows [facility] though  He'll need to call them to obtain the supplies he needs

## 2022-05-13 NOTE — TELEPHONE ENCOUNTER
I called the pt back for an update of where he got his CPAP from  Pt states he is unsure states should be in the computer

## 2022-06-16 DIAGNOSIS — I10 HYPERTENSION, UNSPECIFIED TYPE: ICD-10-CM

## 2022-06-16 DIAGNOSIS — J45.20 MILD INTERMITTENT ASTHMA, UNSPECIFIED WHETHER COMPLICATED: ICD-10-CM

## 2022-06-16 RX ORDER — LISINOPRIL 40 MG/1
40 TABLET ORAL DAILY
Qty: 90 TABLET | Refills: 2 | Status: SHIPPED | OUTPATIENT
Start: 2022-06-16

## 2022-06-16 RX ORDER — ALBUTEROL SULFATE 90 UG/1
AEROSOL, METERED RESPIRATORY (INHALATION)
Qty: 18 G | Refills: 2 | Status: CANCELLED | OUTPATIENT
Start: 2022-06-16

## 2022-06-16 RX ORDER — LISINOPRIL 40 MG/1
40 TABLET ORAL DAILY
Qty: 90 TABLET | Refills: 2 | Status: CANCELLED | OUTPATIENT
Start: 2022-06-16

## 2022-06-16 RX ORDER — ALBUTEROL SULFATE 90 UG/1
1 AEROSOL, METERED RESPIRATORY (INHALATION) EVERY 6 HOURS PRN
Qty: 18 G | Refills: 2 | Status: SHIPPED | OUTPATIENT
Start: 2022-06-16

## 2022-06-16 NOTE — TELEPHONE ENCOUNTER
Pharmacy is requesting refills for patient     Please advise     albuterol (PROVENTIL HFA,VENTOLIN HFA) 90 mcg/act inhaler     lisinopril (ZESTRIL) 40 mg tablet

## 2022-06-17 ENCOUNTER — TELEPHONE (OUTPATIENT)
Dept: FAMILY MEDICINE CLINIC | Facility: CLINIC | Age: 57
End: 2022-06-17

## 2022-06-17 DIAGNOSIS — Z99.89 CPAP (CONTINUOUS POSITIVE AIRWAY PRESSURE) DEPENDENCE: ICD-10-CM

## 2022-06-17 DIAGNOSIS — R06.83 LOUD SNORING: Primary | ICD-10-CM

## 2022-06-17 DIAGNOSIS — G47.30 SLEEP APNEA, UNSPECIFIED TYPE: ICD-10-CM

## 2022-06-17 NOTE — TELEPHONE ENCOUNTER
Pt called office today 06/17/22 requesting a script for a New CPAP breathing machine that Pt uses to sleep  Pt stated that it was discussed on his last Office Visit 05/09/22 due to his current machine not working (pt states)  Please contact Pt for further Information

## 2022-06-17 NOTE — TELEPHONE ENCOUNTER
It does appear that previous PCP [facility] as documented in note and DME order in chart  Would you be able to contact pt's DME supplier to determine status of this order? Thank you!

## 2022-06-22 NOTE — TELEPHONE ENCOUNTER
I attempted to order this CPAP machine but the DX codes were invalid/were not covered  And would not proceed with the order

## 2022-06-27 NOTE — TELEPHONE ENCOUNTER
Please advise patient that he should follow up with Sleep Medicine for a new order  New referral placed

## 2022-07-10 ENCOUNTER — HOSPITAL ENCOUNTER (EMERGENCY)
Facility: HOSPITAL | Age: 57
Discharge: HOME | End: 2022-07-10
Attending: EMERGENCY MEDICINE
Payer: COMMERCIAL

## 2022-07-10 ENCOUNTER — APPOINTMENT (EMERGENCY)
Dept: CT IMAGING | Facility: HOSPITAL | Age: 57
End: 2022-07-10
Payer: COMMERCIAL

## 2022-07-10 VITALS
BODY MASS INDEX: 32.72 KG/M2 | WEIGHT: 202.7 LBS | SYSTOLIC BLOOD PRESSURE: 145 MMHG | DIASTOLIC BLOOD PRESSURE: 89 MMHG | OXYGEN SATURATION: 99 % | RESPIRATION RATE: 16 BRPM | HEART RATE: 77 BPM | TEMPERATURE: 98.7 F

## 2022-07-10 DIAGNOSIS — R60.9 PERIPHERAL EDEMA: Primary | ICD-10-CM

## 2022-07-10 LAB
ANION GAP SERPL CALCULATED.3IONS-SCNC: 6 MMOL/L (ref 5–14)
ATRIAL RATE: 81 BPM
BASOPHILS # BLD AUTO: 0.04 THOUSANDS/ΜL (ref 0–0.1)
BASOPHILS NFR BLD AUTO: 0 % (ref 0–1)
BUN SERPL-MCNC: 19 MG/DL (ref 5–25)
CALCIUM SERPL-MCNC: 8.6 MG/DL (ref 8.4–10.2)
CARDIAC TROPONIN I PNL SERPL HS: 6 NG/L (ref 8–18)
CHLORIDE SERPL-SCNC: 106 MMOL/L (ref 97–108)
CO2 SERPL-SCNC: 29 MMOL/L (ref 22–30)
CREAT SERPL-MCNC: 1.28 MG/DL (ref 0.7–1.5)
EOSINOPHIL # BLD AUTO: 0.13 THOUSAND/ΜL (ref 0–0.61)
EOSINOPHIL NFR BLD AUTO: 1 % (ref 0–6)
ERYTHROCYTE [DISTWIDTH] IN BLOOD BY AUTOMATED COUNT: 14.6 % (ref 11.6–15.1)
GFR SERPL CREATININE-BSD FRML MDRD: 61 ML/MIN/1.73SQ M
GLUCOSE SERPL-MCNC: 125 MG/DL (ref 70–99)
HCT VFR BLD AUTO: 41.6 % (ref 36.5–49.3)
HGB BLD-MCNC: 13.6 G/DL (ref 12–17)
IMM GRANULOCYTES # BLD AUTO: 0.03 THOUSAND/UL (ref 0–0.2)
IMM GRANULOCYTES NFR BLD AUTO: 0 % (ref 0–2)
LYMPHOCYTES # BLD AUTO: 2.43 THOUSANDS/ΜL (ref 0.6–4.47)
LYMPHOCYTES NFR BLD AUTO: 26 % (ref 14–44)
MCH RBC QN AUTO: 28.4 PG (ref 26.8–34.3)
MCHC RBC AUTO-ENTMCNC: 32.7 G/DL (ref 31.4–37.4)
MCV RBC AUTO: 87 FL (ref 82–98)
MONOCYTES # BLD AUTO: 0.88 THOUSAND/ΜL (ref 0.17–1.22)
MONOCYTES NFR BLD AUTO: 9 % (ref 4–12)
NEUTROPHILS # BLD AUTO: 5.93 THOUSANDS/ΜL (ref 1.85–7.62)
NEUTS SEG NFR BLD AUTO: 64 % (ref 43–75)
NRBC BLD AUTO-RTO: 0 /100 WBCS
P AXIS: 49 DEGREES
PLATELET # BLD AUTO: 243 THOUSANDS/UL (ref 149–390)
PMV BLD AUTO: 10.7 FL (ref 8.9–12.7)
POTASSIUM SERPL-SCNC: 3.6 MMOL/L (ref 3.6–5)
PR INTERVAL: 162 MS
QRS AXIS: 39 DEGREES
QRSD INTERVAL: 86 MS
QT INTERVAL: 374 MS
QTC INTERVAL: 434 MS
RBC # BLD AUTO: 4.79 MILLION/UL (ref 3.88–5.62)
SODIUM SERPL-SCNC: 141 MMOL/L (ref 137–147)
T WAVE AXIS: 56 DEGREES
VENTRICULAR RATE: 81 BPM
WBC # BLD AUTO: 9.44 THOUSAND/UL (ref 4.31–10.16)

## 2022-07-10 PROCEDURE — 99284 EMERGENCY DEPT VISIT MOD MDM: CPT

## 2022-07-10 PROCEDURE — 93010 ELECTROCARDIOGRAM REPORT: CPT | Performed by: INTERNAL MEDICINE

## 2022-07-10 PROCEDURE — G1004 CDSM NDSC: HCPCS

## 2022-07-10 PROCEDURE — 93005 ELECTROCARDIOGRAM TRACING: CPT

## 2022-07-10 PROCEDURE — 71275 CT ANGIOGRAPHY CHEST: CPT

## 2022-07-10 PROCEDURE — 85025 COMPLETE CBC W/AUTO DIFF WBC: CPT | Performed by: EMERGENCY MEDICINE

## 2022-07-10 PROCEDURE — 84484 ASSAY OF TROPONIN QUANT: CPT | Performed by: EMERGENCY MEDICINE

## 2022-07-10 PROCEDURE — 80048 BASIC METABOLIC PNL TOTAL CA: CPT | Performed by: EMERGENCY MEDICINE

## 2022-07-10 PROCEDURE — 36415 COLL VENOUS BLD VENIPUNCTURE: CPT | Performed by: EMERGENCY MEDICINE

## 2022-07-10 PROCEDURE — 99285 EMERGENCY DEPT VISIT HI MDM: CPT | Performed by: EMERGENCY MEDICINE

## 2022-07-10 RX ORDER — ACETAMINOPHEN 325 MG/1
975 TABLET ORAL ONCE
Status: COMPLETED | OUTPATIENT
Start: 2022-07-10 | End: 2022-07-10

## 2022-07-10 RX ORDER — HYDROCHLOROTHIAZIDE 25 MG/1
25 TABLET ORAL DAILY
Qty: 3 TABLET | Refills: 0 | Status: SHIPPED | OUTPATIENT
Start: 2022-07-10 | End: 2022-07-11 | Stop reason: SDUPTHER

## 2022-07-10 RX ADMIN — ACETAMINOPHEN 975 MG: 325 TABLET ORAL at 18:40

## 2022-07-10 RX ADMIN — IOHEXOL 70 ML: 350 INJECTION, SOLUTION INTRAVENOUS at 18:49

## 2022-07-10 NOTE — ED PROCEDURE NOTE
PROCEDURE  ECG 12 Lead Documentation Only    Date/Time: 7/10/2022 6:05 PM  Performed by: Brynn Braga MD  Authorized by: Brynn Braga MD     Indications / Diagnosis:  Leg swelling, bloody cough  ECG reviewed by me, the ED Provider: yes    Patient location:  ED  Interpretation:     Interpretation: normal    Rate:     ECG rate:  81    ECG rate assessment: normal    Rhythm:     Rhythm: sinus rhythm    Ectopy:     Ectopy: none    QRS:     QRS axis:  Normal    QRS intervals:  Normal  Conduction:     Conduction: normal    ST segments:     ST segments:  Normal  T waves:     T waves: normal           Brynn Braga MD  07/10/22 1819

## 2022-07-10 NOTE — ED PROVIDER NOTES
History  Chief Complaint   Patient presents with    Leg Swelling     Approx 1 month of increasing swelling to bilat lower legs/ankles/feet along with pain to right calf and behind knee  No injury/trauma  No prolonged period of immobility recently  Patient is a 58-year-old male who presents with a 3 week history of b/l le swelling  Not better in the morning  Worsening over the last 3 weeks  Did have a bloody cough last week  Denies any smoking, recent travel, or immobility  No f/s/c  No h/o similar issues  Prior to Admission Medications   Prescriptions Last Dose Informant Patient Reported? Taking? Diclofenac Sodium (VOLTAREN) 1 %   Yes No   Sig: diclofenac 1 % topical gel   Lidocaine Viscous HCl (XYLOCAINE) 2 % mucosal solution   No No   Sig: Swish and spit 10 mL 4 (four) times a day as needed for mouth pain or discomfort   Patient not taking: Reported on 1/22/2021   QUEtiapine (SEROquel) 50 mg tablet   No No   Sig: Take 1 tablet (50 mg total) by mouth daily at bedtime   albuterol (PROVENTIL HFA,VENTOLIN HFA) 90 mcg/act inhaler   No No   Sig: Inhale 1 puff every 6 (six) hours as needed for wheezing   amLODIPine (NORVASC) 5 mg tablet   No No   Sig: Take 1 tablet (5 mg total) by mouth daily   atorvastatin (LIPITOR) 40 mg tablet   No No   Sig: Take 1 tablet (40 mg total) by mouth daily   cetirizine (ZyrTEC) 10 mg tablet   No No   Sig: Take 1 tablet (10 mg total) by mouth daily   fluticasone-salmeterol (Advair HFA) 230-21 MCG/ACT inhaler   No No   Sig: Inhale 2 puffs 2 (two) times a day Rinse mouth after use  ibuprofen (MOTRIN) 800 mg tablet   No No   Sig: Take 1 tablet (800 mg total) by mouth every 6 (six) hours as needed for moderate pain   lisinopril (ZESTRIL) 40 mg tablet   No No   Sig: Take 1 tablet (40 mg total) by mouth daily Take 1 tablet by mouth once daily     meclizine (ANTIVERT) 25 mg tablet   No No   Sig: Take 2 tablets (50 mg total) by mouth every 12 (twelve) hours as needed for dizziness   Patient not taking: Reported on 1/22/2021   methocarbamol (ROBAXIN) 750 mg tablet   No No   Sig: Take 1 tablet (750 mg total) by mouth 2 (two) times a day as needed for muscle spasms   metoprolol tartrate (LOPRESSOR) 50 mg tablet   No No   Sig: Take 1 tablet (50 mg total) by mouth every 12 (twelve) hours   montelukast (SINGULAIR) 10 mg tablet   No No   Sig: Take 1 tablet (10 mg total) by mouth daily at bedtime   tadalafil (CIALIS) 5 MG tablet   No No   Sig: Take 1 tablet (5 mg total) by mouth daily as needed for erectile dysfunction   triamcinolone (KENALOG) 0 1 % cream   No No   Sig: Apply topically 2 (two) times a day      Facility-Administered Medications: None       Past Medical History:   Diagnosis Date    Asthma     Hyperlipidemia     Hypertension        Past Surgical History:   Procedure Laterality Date    CARPAL TUNNEL RELEASE      right hand       History reviewed  No pertinent family history  I have reviewed and agree with the history as documented  E-Cigarette/Vaping     E-Cigarette/Vaping Substances     Social History     Tobacco Use    Smoking status: Never Smoker    Smokeless tobacco: Never Used   Substance Use Topics    Alcohol use: Never    Drug use: Never       Review of Systems   Constitutional: Negative  HENT: Negative  Eyes: Negative  Respiratory: Positive for cough  Cardiovascular: Positive for leg swelling  Gastrointestinal: Negative  Endocrine: Negative  Genitourinary: Negative  Musculoskeletal: Negative  Skin: Negative  Allergic/Immunologic: Negative  Neurological: Negative  Hematological: Negative  Psychiatric/Behavioral: Negative  All other systems reviewed and are negative  Physical Exam  Physical Exam  Vitals and nursing note reviewed  Constitutional:       Appearance: Normal appearance  He is normal weight  HENT:      Head: Normocephalic and atraumatic        Nose: Nose normal    Cardiovascular:      Rate and Rhythm: Normal rate and regular rhythm  Pulses: Normal pulses  Heart sounds: Normal heart sounds  Pulmonary:      Effort: Pulmonary effort is normal       Breath sounds: Normal breath sounds  Abdominal:      General: Bowel sounds are normal       Palpations: Abdomen is soft  Musculoskeletal:      Cervical back: Normal range of motion and neck supple  Right lower leg: Edema present  Left lower leg: Edema present  Comments: 1+ b/l le pitting edema   Skin:     Capillary Refill: Capillary refill takes less than 2 seconds  Neurological:      General: No focal deficit present  Mental Status: He is alert and oriented to person, place, and time     Psychiatric:         Mood and Affect: Mood normal          Behavior: Behavior normal          Vital Signs  ED Triage Vitals   Temperature Pulse Respirations Blood Pressure SpO2   07/10/22 1719 07/10/22 1719 07/10/22 1719 07/10/22 1719 07/10/22 1719   98 7 °F (37 1 °C) 89 20 169/86 98 %      Temp Source Heart Rate Source Patient Position - Orthostatic VS BP Location FiO2 (%)   07/10/22 1719 07/10/22 1719 07/10/22 1719 07/10/22 1719 --   Oral Monitor Sitting Left arm       Pain Score       07/10/22 1840       7           Vitals:    07/10/22 1719 07/10/22 1909   BP: 169/86 145/89   Pulse: 89 77   Patient Position - Orthostatic VS: Sitting Lying         Visual Acuity      ED Medications  Medications   acetaminophen (TYLENOL) tablet 975 mg (975 mg Oral Given 7/10/22 1840)   iohexol (OMNIPAQUE) 350 MG/ML injection (MULTI-DOSE) 70 mL (70 mL Intravenous Given 7/10/22 1849)       Diagnostic Studies  Results Reviewed     Procedure Component Value Units Date/Time    High Sensitivity Troponin I Random [[id number]]  (Abnormal) Collected: 07/10/22 1806    Lab Status: Final result Specimen: Blood from Arm, Right Updated: 07/10/22 1841     HS TnI random 6 ng/L     Basic metabolic panel [[id number]]  (Abnormal) Collected: 07/10/22 1806    Lab Status: Final result Specimen: Blood from Arm, Right Updated: 07/10/22 1834     Sodium 141 mmol/L      Potassium 3 6 mmol/L      Chloride 106 mmol/L      CO2 29 mmol/L      ANION GAP 6 mmol/L      BUN 19 mg/dL      Creatinine 1 28 mg/dL      Glucose 125 mg/dL      Calcium 8 6 mg/dL      eGFR 61 ml/min/1 73sq m     Narrative:      [place] guidelines for Chronic Kidney Disease (CKD):     Stage 1 with normal or high GFR (GFR > 90 mL/min/1 73 square meters)    Stage 2 Mild CKD (GFR = 60-89 mL/min/1 73 square meters)    Stage 3A Moderate CKD (GFR = 45-59 mL/min/1 73 square meters)    Stage 3B Moderate CKD (GFR = 30-44 mL/min/1 73 square meters)    Stage 4 Severe CKD (GFR = 15-29 mL/min/1 73 square meters)    Stage 5 End Stage CKD (GFR <15 mL/min/1 73 square meters)  Note: GFR calculation is accurate only with a steady state creatinine    CBC and differential [[id number]] Collected: 07/10/22 1806    Lab Status: Final result Specimen: Blood from Arm, Right Updated: 07/10/22 1818     WBC 9 44 Thousand/uL      RBC 4 79 Million/uL      Hemoglobin 13 6 g/dL      Hematocrit 41 6 %      MCV 87 fL      MCH 28 4 pg      MCHC 32 7 g/dL      RDW 14 6 %      MPV 10 7 fL      Platelets 419 Thousands/uL      nRBC 0 /100 WBCs      Neutrophils Relative 64 %      Immat GRANS % 0 %      Lymphocytes Relative 26 %      Monocytes Relative 9 %      Eosinophils Relative 1 %      Basophils Relative 0 %      Neutrophils Absolute 5 93 Thousands/µL      Immature Grans Absolute 0 03 Thousand/uL      Lymphocytes Absolute 2 43 Thousands/µL      Monocytes Absolute 0 88 Thousand/µL      Eosinophils Absolute 0 13 Thousand/µL      Basophils Absolute 0 04 Thousands/µL                  CTA ED chest PE Study   Final Result by Jessie Burns MD (07/10 2000)      1  No filling defect to suggest acute or chronic pulmonary embolism in the entire pulmonary arterial system  Measured RV/LV ratio is within normal limits at less than 0 9  2   No acute findings in the chest    3   Hepatic steatosis  Workstation performed: [id number]                    Procedures  Procedures         ED Course  ED Course as of 07/10/22 2144   Malena Escalante Jul 10, 2022   2004 Went over results  Will dc with 3 days of hctz  [facility]  Number of Diagnoses or Management Options     Amount and/or Complexity of Data Reviewed  Clinical lab tests: ordered and reviewed  Tests in the radiology section of CPT®: reviewed and ordered  Tests in the medicine section of CPT®: reviewed and ordered  Review and summarize past medical records: yes  Independent visualization of images, tracings, or specimens: yes        Disposition  Final diagnoses:   Peripheral edema     Time reflects when diagnosis was documented in both MDM as applicable and the Disposition within this note     Time User Action Codes Description Comment    7/10/2022  8:05 PM Wilhemena Abts Add [R60 9] Peripheral edema       ED Disposition     ED Disposition   Discharge    Condition   Stable    Date/Time   Sun Jul 10, 2022  8:05 PM    Comment   Kimmie Roger discharge to home/self care                 Follow-up Information     Follow up With Specialties Details Why Contact Info    MAREN Baker Nurse Practitioner, Family Medicine   [address] [id number] [phone]            Discharge Medication List as of 7/10/2022  8:05 PM      START taking these medications    Details   hydrochlorothiazide (HYDRODIURIL) 25 mg tablet Take 1 tablet (25 mg total) by mouth daily for 3 days, Starting Sun 7/10/2022, Until Wed 7/13/2022, Normal         CONTINUE these medications which have NOT CHANGED    Details   albuterol (PROVENTIL HFA,VENTOLIN HFA) 90 mcg/act inhaler Inhale 1 puff every 6 (six) hours as needed for wheezing, Starting u 6/16/2022, Normal      amLODIPine (NORVASC) 5 mg tablet Take 1 tablet (5 mg total) by mouth daily, Starting Mon 5/9/2022, Normal      atorvastatin (LIPITOR) 40 mg tablet Take 1 tablet (40 mg total) by mouth daily, Starting Mon 5/9/2022, Normal      cetirizine (ZyrTEC) 10 mg tablet Take 1 tablet (10 mg total) by mouth daily, Starting Fri 1/22/2021, Normal      Diclofenac Sodium (VOLTAREN) 1 % diclofenac 1 % topical gel, Historical Med      fluticasone-salmeterol (Advair HFA) 230-21 MCG/ACT inhaler Inhale 2 puffs 2 (two) times a day Rinse mouth after use , Starting Mon 5/9/2022, Normal      ibuprofen (MOTRIN) 800 mg tablet Take 1 tablet (800 mg total) by mouth every 6 (six) hours as needed for moderate pain, Starting Tue 7/13/2021, Normal      Lidocaine Viscous HCl (XYLOCAINE) 2 % mucosal solution Swish and spit 10 mL 4 (four) times a day as needed for mouth pain or discomfort, Starting Sun 8/16/2020, Normal      lisinopril (ZESTRIL) 40 mg tablet Take 1 tablet (40 mg total) by mouth daily Take 1 tablet by mouth once daily  , Starting Thu 6/16/2022, Normal      meclizine (ANTIVERT) 25 mg tablet Take 2 tablets (50 mg total) by mouth every 12 (twelve) hours as needed for dizziness, Starting Wed 8/5/2020, Print      methocarbamol (ROBAXIN) 750 mg tablet Take 1 tablet (750 mg total) by mouth 2 (two) times a day as needed for muscle spasms, Starting Tue 7/13/2021, Normal      metoprolol tartrate (LOPRESSOR) 50 mg tablet Take 1 tablet (50 mg total) by mouth every 12 (twelve) hours, Starting Mon 5/9/2022, Normal      montelukast (SINGULAIR) 10 mg tablet Take 1 tablet (10 mg total) by mouth daily at bedtime, Starting Wed 10/13/2021, Normal      QUEtiapine (SEROquel) 50 mg tablet Take 1 tablet (50 mg total) by mouth daily at bedtime, Starting Wed 10/13/2021, Normal      tadalafil (CIALIS) 5 MG tablet Take 1 tablet (5 mg total) by mouth daily as needed for erectile dysfunction, Starting Fri 1/22/2021, Normal      triamcinolone (KENALOG) 0 1 % cream Apply topically 2 (two) times a day, Starting Wed 10/13/2021, Normal             No discharge procedures on file      PDMP Review     None          ED Provider  Electronically Signed by           Raven Dallas MD  07/10/22 3875

## 2022-07-11 ENCOUNTER — OFFICE VISIT (OUTPATIENT)
Dept: FAMILY MEDICINE CLINIC | Facility: CLINIC | Age: 57
End: 2022-07-11

## 2022-07-11 VITALS
RESPIRATION RATE: 16 BRPM | BODY MASS INDEX: 33.91 KG/M2 | HEART RATE: 89 BPM | HEIGHT: 66 IN | DIASTOLIC BLOOD PRESSURE: 80 MMHG | SYSTOLIC BLOOD PRESSURE: 138 MMHG | OXYGEN SATURATION: 98 % | WEIGHT: 211 LBS | TEMPERATURE: 98.7 F

## 2022-07-11 DIAGNOSIS — R60.9 PERIPHERAL EDEMA: ICD-10-CM

## 2022-07-11 PROCEDURE — 99213 OFFICE O/P EST LOW 20 MIN: CPT | Performed by: FAMILY MEDICINE

## 2022-07-11 PROCEDURE — 3079F DIAST BP 80-89 MM HG: CPT | Performed by: FAMILY MEDICINE

## 2022-07-11 PROCEDURE — 3075F SYST BP GE 130 - 139MM HG: CPT | Performed by: FAMILY MEDICINE

## 2022-07-11 RX ORDER — HYDROCHLOROTHIAZIDE 25 MG/1
12.5 TABLET ORAL DAILY
Qty: 15 TABLET | Refills: 2 | Status: SHIPPED | OUTPATIENT
Start: 2022-07-11 | End: 2022-08-31

## 2022-07-11 NOTE — ASSESSMENT & PLAN NOTE
Was using amlodipine 5 mg and has had pitting edema  Denies any SOB  Was in the ED yesterday and started on HCTZ, however, did not go to the pharmacy to get medications     - Will switch amlodipine to HCTZ 12 5 mg daily  - if Edema does not resolve will do ECHO   - Advised patient to keep a blood pressure log to return with next visit

## 2022-07-11 NOTE — PROGRESS NOTES
Assessment/Plan:    Hypertension  Was using amlodipine 5 mg and has had pitting edema  Denies any SOB  Was in the ED yesterday and started on HCTZ, however, did not go to the pharmacy to get medications  - Will switch amlodipine to HCTZ 12 5 mg daily  - if Edema does not resolve will do ECHO   - Advised patient to keep a blood pressure log to return with next visit        Diagnoses and all orders for this visit:    Peripheral edema  -     hydrochlorothiazide (HYDRODIURIL) 25 mg tablet; Take 0 5 tablets (12 5 mg total) by mouth daily        Subjective:      Patient ID: Dianne Mccurdy is a 60 y o  male  Patient reports today for a 's license/permit physical  They're past medical history is significant for asthma and hypertension  Patient does not have any of the following that would prevent control of a motor vehicle: Neurological disorders, neuropsychiatric disorders, circulatory disorder, cardiac disorder, hypertension, uncontrolled epilepsy, uncontrolled diabetes, cognitive impairment, alcohol abuse, drug abuse, conditions causing repeated lapses of consciousness (e g  Epilepsy, narcolepsy, hysteria, etc)  Exam: vision, neck range of motion, CV, lungs, CN     Not performed  Normal hearing thresholds bilaterally    Advised to report back to [facility] immediately if any new conditions or limitations develop  Discussed importance of seat belt safety for  and all passengers in the car  Discussed importance of patient's knowledge of car seat and booster seat use when applicable  Advised not to use alcohol, drugs, or substances which inhibit ability to operate a vehicle  Do not use cell phone or other devices which can distract while operating a vehicle  Reviewed importance of familiarizing one's self with the rules and regulations outlined in drivers manual       Form filled out, signed, and handed back to the patient          The following portions of the patient's history were reviewed and updated as appropriate: allergies, current medications, past family history, past medical history, past social history, past surgical history and problem list     Review of Systems   Constitutional: Negative for chills and fever  HENT: Negative for ear pain and sore throat  Eyes: Negative for pain and visual disturbance  Respiratory: Negative for cough and shortness of breath  Cardiovascular: Negative for chest pain and palpitations  Gastrointestinal: Negative for abdominal pain and vomiting  Genitourinary: Negative for dysuria and hematuria  Musculoskeletal: Negative for arthralgias and back pain  Skin: Negative for color change and rash  Neurological: Negative for seizures and syncope  All other systems reviewed and are negative  Objective:    /80 (BP Location: Right arm, Patient Position: Sitting, Cuff Size: Standard)   Pulse 89   Temp 98 7 °F (37 1 °C) (Temporal)   Resp 16   Ht 5' 6" (1 676 m)   Wt 95 7 kg (211 lb)   SpO2 98%   BMI 34 06 kg/m²      Physical Exam  Constitutional:       Appearance: Normal appearance  HENT:      Head: Normocephalic and atraumatic  Mouth/Throat:      Mouth: Mucous membranes are moist       Pharynx: Oropharynx is clear  Cardiovascular:      Rate and Rhythm: Normal rate and regular rhythm  Pulses: Normal pulses  Heart sounds: Normal heart sounds  Pulmonary:      Effort: Pulmonary effort is normal       Breath sounds: Normal breath sounds  Abdominal:      General: Bowel sounds are normal       Palpations: Abdomen is soft  Skin:     General: Skin is warm  Neurological:      Mental Status: He is alert and oriented to person, place, and time     Psychiatric:         Mood and Affect: Mood normal            Poonam Leyva MD  07/11/22  9:28 AM

## 2022-07-29 ENCOUNTER — OFFICE VISIT (OUTPATIENT)
Dept: FAMILY MEDICINE CLINIC | Facility: CLINIC | Age: 57
End: 2022-07-29

## 2022-07-29 VITALS
DIASTOLIC BLOOD PRESSURE: 76 MMHG | TEMPERATURE: 97.7 F | HEIGHT: 66 IN | SYSTOLIC BLOOD PRESSURE: 128 MMHG | OXYGEN SATURATION: 98 % | HEART RATE: 75 BPM | BODY MASS INDEX: 32.95 KG/M2 | WEIGHT: 205 LBS | RESPIRATION RATE: 18 BRPM

## 2022-07-29 DIAGNOSIS — J34.89 SINUS PRESSURE: Primary | ICD-10-CM

## 2022-07-29 DIAGNOSIS — M79.89 LEG SWELLING: ICD-10-CM

## 2022-07-29 DIAGNOSIS — J45.20 MILD INTERMITTENT ASTHMA, UNSPECIFIED WHETHER COMPLICATED: ICD-10-CM

## 2022-07-29 PROCEDURE — 3074F SYST BP LT 130 MM HG: CPT | Performed by: FAMILY MEDICINE

## 2022-07-29 PROCEDURE — 99213 OFFICE O/P EST LOW 20 MIN: CPT | Performed by: FAMILY MEDICINE

## 2022-07-29 PROCEDURE — 3078F DIAST BP <80 MM HG: CPT | Performed by: FAMILY MEDICINE

## 2022-07-29 RX ORDER — FUROSEMIDE 20 MG/1
20 TABLET ORAL DAILY
Qty: 3 TABLET | Refills: 0 | Status: SHIPPED | OUTPATIENT
Start: 2022-07-29 | End: 2022-08-16 | Stop reason: ALTCHOICE

## 2022-07-29 RX ORDER — CETIRIZINE HYDROCHLORIDE 10 MG/1
10 TABLET ORAL DAILY
Qty: 30 TABLET | Refills: 1 | Status: SHIPPED | OUTPATIENT
Start: 2022-07-29 | End: 2022-08-16 | Stop reason: ALTCHOICE

## 2022-07-29 NOTE — ASSESSMENT & PLAN NOTE
One month of leg swelling  Was on amlodipine, was discontinued however patient is unsure if he has stopped the medication  On examination lungs are clear and+2 pitting edema to shin b/l  Does not have any varicose veins or skin changes at this time     Does endorse SOB, however unsure     Plan  · Will do echo to rule out congestive heart failure   · Will do Lasix for 3 days to help improve leg swelling  ·  consider compression stockings if congestive failure is ruled out  ·  patient to return in the next couple weeks to do full medication review with all medications

## 2022-07-29 NOTE — PROGRESS NOTES
Assessment/Plan:    Leg swelling  One month of leg swelling  Was on amlodipine, was discontinued however patient is unsure if he has stopped the medication  On examination lungs are clear and+2 pitting edema to shin b/l  Does not have any varicose veins or skin changes at this time  Does endorse SOB, however unsure     Plan  · Will do echo to rule out congestive heart failure   · Will do Lasix for 3 days to help improve leg swelling  ·  consider compression stockings if congestive failure is ruled out  ·  patient to return in the next couple weeks to do full medication review with all medications    Asthma   No PFTs on file will do a PFT to see baseline and confirm diagnosis  of asthma       Diagnoses and all orders for this visit:    Sinus pressure  -     Complete PFT with post bronchodilator; Future  -     cetirizine (ZyrTEC) 10 mg tablet; Take 1 tablet (10 mg total) by mouth daily    Leg swelling  -     Echo complete w/ contrast if indicated; Future  -     furosemide (LASIX) 20 mg tablet; Take 1 tablet (20 mg total) by mouth daily for 3 days    Mild intermittent asthma, unspecified whether complicated          Subjective:      Patient ID: Hui Tillman is a 60 y o  male  Patient is a 47-year-old male with past medical history of hypertension, asthma, AMOL presenting today for leg swelling  Patient has had leg swelling for approximately 5 weeks, had gone to the ED a few weeks ago and was prescribed hydrochlorothiazide for 3 days which did not improve his leg swelling  Patient does endorse shortness of breath with exertion  The following portions of the patient's history were reviewed and updated as appropriate: allergies, current medications, past family history, past medical history, past social history, past surgical history and problem list     Review of Systems   Constitutional: Negative for chills and fever  HENT: Negative for ear pain and sore throat      Eyes: Negative for pain and visual disturbance  Respiratory: Positive for shortness of breath  Negative for cough  Cardiovascular: Negative for chest pain and palpitations  Gastrointestinal: Negative for abdominal pain and vomiting  Genitourinary: Negative for dysuria and hematuria  Musculoskeletal: Negative for arthralgias and back pain  Skin: Negative for color change and rash  Neurological: Negative for seizures and syncope  All other systems reviewed and are negative  Objective:  /76 (BP Location: Right arm, Patient Position: Sitting, Cuff Size: Standard)   Pulse 75   Temp 97 7 °F (36 5 °C) (Temporal)   Resp 18   Ht 5' 6" (1 676 m)   Wt 93 kg (205 lb)   SpO2 98%   BMI 33 09 kg/m²        Physical Exam  Vitals and nursing note reviewed  Constitutional:       Appearance: Normal appearance  HENT:      Head: Normocephalic and atraumatic  Nose: Congestion present  Cardiovascular:      Rate and Rhythm: Normal rate and regular rhythm  Pulses: Normal pulses  Heart sounds: Normal heart sounds  No murmur heard  No friction rub  No gallop  Pulmonary:      Effort: Pulmonary effort is normal  No respiratory distress  Breath sounds: Normal breath sounds  No stridor  No wheezing, rhonchi or rales  Musculoskeletal:      Right lower le+ Pitting Edema present  Left lower le+ Pitting Edema present  Skin:     General: Skin is warm  Neurological:      Mental Status: He is alert and oriented to person, place, and time     Psychiatric:         Mood and Affect: Mood normal

## 2022-08-16 ENCOUNTER — OFFICE VISIT (OUTPATIENT)
Dept: FAMILY MEDICINE CLINIC | Facility: CLINIC | Age: 57
End: 2022-08-16

## 2022-08-16 VITALS
OXYGEN SATURATION: 97 % | DIASTOLIC BLOOD PRESSURE: 90 MMHG | HEART RATE: 80 BPM | SYSTOLIC BLOOD PRESSURE: 142 MMHG | BODY MASS INDEX: 32.09 KG/M2 | RESPIRATION RATE: 18 BRPM | HEIGHT: 66 IN | WEIGHT: 199.7 LBS | TEMPERATURE: 97.8 F

## 2022-08-16 DIAGNOSIS — J30.9 ALLERGIC RHINITIS, UNSPECIFIED SEASONALITY, UNSPECIFIED TRIGGER: ICD-10-CM

## 2022-08-16 DIAGNOSIS — N52.8 OTHER MALE ERECTILE DYSFUNCTION: ICD-10-CM

## 2022-08-16 DIAGNOSIS — J45.20 MILD INTERMITTENT ASTHMA, UNSPECIFIED WHETHER COMPLICATED: ICD-10-CM

## 2022-08-16 DIAGNOSIS — Z23 ENCOUNTER FOR ADMINISTRATION OF VACCINE: ICD-10-CM

## 2022-08-16 DIAGNOSIS — Z12.11 SCREENING FOR COLON CANCER: ICD-10-CM

## 2022-08-16 DIAGNOSIS — Z79.899 ENCOUNTER FOR MEDICATION REVIEW: Primary | ICD-10-CM

## 2022-08-16 DIAGNOSIS — J34.89 SINUS PRESSURE: ICD-10-CM

## 2022-08-16 PROCEDURE — 90471 IMMUNIZATION ADMIN: CPT | Performed by: INTERNAL MEDICINE

## 2022-08-16 PROCEDURE — 90715 TDAP VACCINE 7 YRS/> IM: CPT | Performed by: INTERNAL MEDICINE

## 2022-08-16 PROCEDURE — 3080F DIAST BP >= 90 MM HG: CPT | Performed by: INTERNAL MEDICINE

## 2022-08-16 PROCEDURE — 90472 IMMUNIZATION ADMIN EACH ADD: CPT | Performed by: INTERNAL MEDICINE

## 2022-08-16 PROCEDURE — 3077F SYST BP >= 140 MM HG: CPT | Performed by: INTERNAL MEDICINE

## 2022-08-16 PROCEDURE — 90677 PCV20 VACCINE IM: CPT | Performed by: INTERNAL MEDICINE

## 2022-08-16 PROCEDURE — 99213 OFFICE O/P EST LOW 20 MIN: CPT | Performed by: INTERNAL MEDICINE

## 2022-08-16 RX ORDER — TADALAFIL 5 MG/1
5 TABLET ORAL DAILY PRN
Qty: 10 TABLET | Refills: 0 | Status: SHIPPED | OUTPATIENT
Start: 2022-08-16

## 2022-08-16 RX ORDER — FLUTICASONE PROPIONATE 50 MCG
1 SPRAY, SUSPENSION (ML) NASAL DAILY
Qty: 11.1 ML | Refills: 3 | Status: SHIPPED | OUTPATIENT
Start: 2022-08-16

## 2022-08-16 RX ORDER — MONTELUKAST SODIUM 10 MG/1
10 TABLET ORAL
Qty: 90 TABLET | Refills: 3 | Status: SHIPPED | OUTPATIENT
Start: 2022-08-16

## 2022-08-16 NOTE — ASSESSMENT & PLAN NOTE
Blood pressure is controlled on present treatment regimen  Patient misses no doses and tolerates the meds without side effects  Patient avoids salt  Discussed diet, exercise and weight control  No change in present meds   Continue HBPM     Blood Pressure: 142/90   Current medications:  Metoprolol 50 mg, hydrochlorothiazide 12 5 mg, and lisinopril 40 mg daily

## 2022-08-16 NOTE — ASSESSMENT & PLAN NOTE
Patient continues to have moderate persistent asthma  Reports that he gets daily symptoms and has to uses albuterol  Daily medications include Advair and albuterol  Patient stopped taking his Singulair  Will restart Singulair for better control of his asthma

## 2022-08-16 NOTE — ASSESSMENT & PLAN NOTE
Patient continues to report that he has been having some erectile dysfunction and is causing problems with his wife  He would like to restart his Cialis that he had gotten last year  Will do trial of Cialis  Risks and benefits of Cialis were discussed  Patient understanding

## 2022-08-16 NOTE — PROGRESS NOTES
Assessment/Plan:    Hypertension  Blood pressure is controlled on present treatment regimen  Patient misses no doses and tolerates the meds without side effects  Patient avoids salt  Discussed diet, exercise and weight control  No change in present meds  Continue HBPM     Blood Pressure: 142/90   Current medications:  Metoprolol 50 mg, hydrochlorothiazide 12 5 mg, and lisinopril 40 mg daily    Asthma  Patient continues to have moderate persistent asthma  Reports that he gets daily symptoms and has to uses albuterol  Daily medications include Advair and albuterol  Patient stopped taking his Singulair  Will restart Singulair for better control of his asthma  Allergic rhinitis  Continues to have allergic rhinitis  Reports that Zyrtec is not working  Would like to try Flonase again  Will restart Flonase  Other male erectile dysfunction  Patient continues to report that he has been having some erectile dysfunction and is causing problems with his wife  He would like to restart his Cialis that he had gotten last year  Will do trial of Cialis  Risks and benefits of Cialis were discussed  Patient understanding  Diagnoses and all orders for this visit:    Encounter for medication review  Comments: All medications that patient is currently on were reviewed  Other male erectile dysfunction  -     tadalafil (CIALIS) 5 MG tablet; Take 1 tablet (5 mg total) by mouth daily as needed for erectile dysfunction    Mild intermittent asthma, unspecified whether complicated  -     montelukast (SINGULAIR) 10 mg tablet; Take 1 tablet (10 mg total) by mouth daily at bedtime    Sinus pressure  -     fluticasone (FLONASE) 50 mcg/act nasal spray; 1 spray into each nostril daily    Screening for colon cancer  -     Ambulatory Referral to Gastroenterology; Future    Encounter for administration of vaccine  Comments:  Discussed risks and benefits of pneumococcal and Tdap vaccinations    All questions were answered at this time  Orders:  -     TDAP VACCINE GREATER THAN OR EQUAL TO 8YO IM  -     Pneumococcal Conjugate Vaccine 20-valent (Pcv20)    Allergic rhinitis, unspecified seasonality, unspecified trigger          Subjective:      Patient ID: Tavo Guerin is a 60 y o  male  Mr  Tavo Guerin is a 58-year-old male with past hypertension presenting today for medication review  At the last visit patient was unsure what medication he was currently taking  Currently he does not have any problems and reports that his leg swelling has improved  The following portions of the patient's history were reviewed and updated as appropriate: allergies, current medications, past family history, past medical history, past social history, past surgical history and problem list   Review of Systems   Constitutional: Negative for chills and fever  HENT: Negative for ear pain and sore throat  Eyes: Negative for pain and visual disturbance  Respiratory: Negative for cough and shortness of breath  Cardiovascular: Negative for chest pain and palpitations  Gastrointestinal: Negative for abdominal pain and vomiting  Genitourinary: Negative for dysuria and hematuria  Musculoskeletal: Negative for arthralgias and back pain  Skin: Negative for color change and rash  Neurological: Negative for seizures and syncope  All other systems reviewed and are negative  Objective:  /90 (BP Location: Left arm, Patient Position: Sitting, Cuff Size: Standard)   Pulse 80   Temp 97 8 °F (36 6 °C) (Temporal)   Resp 18   Ht 5' 6" (1 676 m)   Wt 90 6 kg (199 lb 11 2 oz)   SpO2 97%   BMI 32 23 kg/m²      Physical Exam  Constitutional:       Appearance: Normal appearance  HENT:      Head: Normocephalic and atraumatic  Mouth/Throat:      Mouth: Mucous membranes are moist       Pharynx: Oropharynx is clear  Cardiovascular:      Rate and Rhythm: Normal rate and regular rhythm        Pulses: Normal pulses  Heart sounds: Normal heart sounds  Pulmonary:      Effort: Pulmonary effort is normal       Breath sounds: Normal breath sounds  Abdominal:      General: Bowel sounds are normal       Palpations: Abdomen is soft  Musculoskeletal:      Right lower leg: No edema  Left lower leg: No edema  Skin:     General: Skin is warm  Neurological:      Mental Status: He is alert and oriented to person, place, and time     Psychiatric:         Mood and Affect: Mood normal

## 2022-08-16 NOTE — ASSESSMENT & PLAN NOTE
Continues to have allergic rhinitis  Reports that Zyrtec is not working  Would like to try Flonase again  Will restart Flonase

## 2022-08-23 DIAGNOSIS — J34.89 SINUS PRESSURE: ICD-10-CM

## 2022-08-31 DIAGNOSIS — R60.9 PERIPHERAL EDEMA: ICD-10-CM

## 2022-08-31 RX ORDER — CETIRIZINE HYDROCHLORIDE 10 MG/1
10 TABLET ORAL DAILY
Qty: 30 TABLET | Refills: 1 | Status: SHIPPED | OUTPATIENT
Start: 2022-08-31 | End: 2022-10-13

## 2022-08-31 RX ORDER — HYDROCHLOROTHIAZIDE 25 MG/1
12.5 TABLET ORAL DAILY
Qty: 15 TABLET | Refills: 2 | Status: SHIPPED | OUTPATIENT
Start: 2022-08-31 | End: 2022-11-29

## 2022-09-12 ENCOUNTER — HOSPITAL ENCOUNTER (EMERGENCY)
Facility: HOSPITAL | Age: 57
Discharge: HOME | End: 2022-09-12
Attending: EMERGENCY MEDICINE
Payer: COMMERCIAL

## 2022-09-12 VITALS
OXYGEN SATURATION: 98 % | DIASTOLIC BLOOD PRESSURE: 116 MMHG | TEMPERATURE: 98.4 F | RESPIRATION RATE: 16 BRPM | HEIGHT: 66 IN | HEART RATE: 62 BPM | WEIGHT: 204.4 LBS | BODY MASS INDEX: 32.85 KG/M2 | SYSTOLIC BLOOD PRESSURE: 193 MMHG

## 2022-09-12 DIAGNOSIS — I10 HYPERTENSION: Primary | ICD-10-CM

## 2022-09-12 DIAGNOSIS — E78.5 HYPERLIPIDEMIA, UNSPECIFIED HYPERLIPIDEMIA TYPE: ICD-10-CM

## 2022-09-12 DIAGNOSIS — R42 LIGHT HEADED: ICD-10-CM

## 2022-09-12 LAB
ALBUMIN SERPL BCP-MCNC: 4 G/DL (ref 3.5–5)
ALP SERPL-CCNC: 84 U/L (ref 43–122)
ALT SERPL W P-5'-P-CCNC: 56 U/L
ANION GAP SERPL CALCULATED.3IONS-SCNC: 4 MMOL/L (ref 5–14)
AST SERPL W P-5'-P-CCNC: 37 U/L (ref 17–59)
ATRIAL RATE: 70 BPM
BACTERIA UR QL AUTO: NORMAL /HPF
BASOPHILS # BLD AUTO: 0.05 THOUSANDS/ΜL (ref 0–0.1)
BASOPHILS NFR BLD AUTO: 1 % (ref 0–1)
BILIRUB SERPL-MCNC: 0.44 MG/DL (ref 0.2–1)
BILIRUB UR QL STRIP: NEGATIVE
BUN SERPL-MCNC: 13 MG/DL (ref 5–25)
CALCIUM SERPL-MCNC: 8.7 MG/DL (ref 8.4–10.2)
CARDIAC TROPONIN I PNL SERPL HS: 5 NG/L
CHLORIDE SERPL-SCNC: 103 MMOL/L (ref 96–108)
CLARITY UR: CLEAR
CO2 SERPL-SCNC: 31 MMOL/L (ref 21–32)
COLOR UR: ABNORMAL
CREAT SERPL-MCNC: 0.86 MG/DL (ref 0.7–1.5)
EOSINOPHIL # BLD AUTO: 0.11 THOUSAND/ΜL (ref 0–0.61)
EOSINOPHIL NFR BLD AUTO: 1 % (ref 0–6)
ERYTHROCYTE [DISTWIDTH] IN BLOOD BY AUTOMATED COUNT: 14.6 % (ref 11.6–15.1)
GFR SERPL CREATININE-BSD FRML MDRD: 95 ML/MIN/1.73SQ M
GLUCOSE SERPL-MCNC: 100 MG/DL (ref 65–140)
GLUCOSE SERPL-MCNC: 119 MG/DL (ref 70–99)
GLUCOSE UR STRIP-MCNC: NEGATIVE MG/DL
HCT VFR BLD AUTO: 44.5 % (ref 36.5–49.3)
HGB BLD-MCNC: 14.2 G/DL (ref 12–17)
HGB UR QL STRIP.AUTO: 25
IMM GRANULOCYTES # BLD AUTO: 0.03 THOUSAND/UL (ref 0–0.2)
IMM GRANULOCYTES NFR BLD AUTO: 0 % (ref 0–2)
KETONES UR STRIP-MCNC: NEGATIVE MG/DL
LEUKOCYTE ESTERASE UR QL STRIP: NEGATIVE
LIPASE SERPL-CCNC: 51 U/L (ref 23–300)
LYMPHOCYTES # BLD AUTO: 1.84 THOUSANDS/ΜL (ref 0.6–4.47)
LYMPHOCYTES NFR BLD AUTO: 23 % (ref 14–44)
MCH RBC QN AUTO: 28 PG (ref 26.8–34.3)
MCHC RBC AUTO-ENTMCNC: 31.9 G/DL (ref 31.4–37.4)
MCV RBC AUTO: 88 FL (ref 82–98)
MONOCYTES # BLD AUTO: 0.68 THOUSAND/ΜL (ref 0.17–1.22)
MONOCYTES NFR BLD AUTO: 9 % (ref 4–12)
NEUTROPHILS # BLD AUTO: 5.32 THOUSANDS/ΜL (ref 1.85–7.62)
NEUTS SEG NFR BLD AUTO: 66 % (ref 43–75)
NITRITE UR QL STRIP: NEGATIVE
NON-SQ EPI CELLS URNS QL MICRO: NORMAL /HPF
NRBC BLD AUTO-RTO: 0 /100 WBCS
NT-PROBNP SERPL-MCNC: 67 PG/ML (ref 0–299)
P AXIS: 45 DEGREES
PH UR STRIP.AUTO: 6 [PH]
PLATELET # BLD AUTO: 252 THOUSANDS/UL (ref 149–390)
PMV BLD AUTO: 10.8 FL (ref 8.9–12.7)
POTASSIUM SERPL-SCNC: 4.3 MMOL/L (ref 3.5–5.3)
PR INTERVAL: 172 MS
PROT SERPL-MCNC: 7.8 G/DL (ref 6.4–8.4)
PROT UR STRIP-MCNC: NEGATIVE MG/DL
QRS AXIS: 31 DEGREES
QRSD INTERVAL: 80 MS
QT INTERVAL: 398 MS
QTC INTERVAL: 429 MS
RBC # BLD AUTO: 5.07 MILLION/UL (ref 3.88–5.62)
RBC #/AREA URNS AUTO: NORMAL /HPF
SODIUM SERPL-SCNC: 138 MMOL/L (ref 135–147)
SP GR UR STRIP.AUTO: 1.01 (ref 1–1.04)
T WAVE AXIS: 78 DEGREES
UROBILINOGEN UA: NEGATIVE MG/DL
VENTRICULAR RATE: 70 BPM
WBC # BLD AUTO: 8.03 THOUSAND/UL (ref 4.31–10.16)
WBC #/AREA URNS AUTO: NORMAL /HPF

## 2022-09-12 PROCEDURE — 36415 COLL VENOUS BLD VENIPUNCTURE: CPT | Performed by: EMERGENCY MEDICINE

## 2022-09-12 PROCEDURE — 82948 REAGENT STRIP/BLOOD GLUCOSE: CPT

## 2022-09-12 PROCEDURE — 99285 EMERGENCY DEPT VISIT HI MDM: CPT | Performed by: EMERGENCY MEDICINE

## 2022-09-12 PROCEDURE — 83880 ASSAY OF NATRIURETIC PEPTIDE: CPT | Performed by: EMERGENCY MEDICINE

## 2022-09-12 PROCEDURE — 93010 ELECTROCARDIOGRAM REPORT: CPT | Performed by: STUDENT IN AN ORGANIZED HEALTH CARE EDUCATION/TRAINING PROGRAM

## 2022-09-12 PROCEDURE — 84484 ASSAY OF TROPONIN QUANT: CPT | Performed by: EMERGENCY MEDICINE

## 2022-09-12 PROCEDURE — 85025 COMPLETE CBC W/AUTO DIFF WBC: CPT | Performed by: EMERGENCY MEDICINE

## 2022-09-12 PROCEDURE — 99284 EMERGENCY DEPT VISIT MOD MDM: CPT

## 2022-09-12 PROCEDURE — 80053 COMPREHEN METABOLIC PANEL: CPT | Performed by: EMERGENCY MEDICINE

## 2022-09-12 PROCEDURE — 93005 ELECTROCARDIOGRAM TRACING: CPT

## 2022-09-12 PROCEDURE — 83690 ASSAY OF LIPASE: CPT | Performed by: EMERGENCY MEDICINE

## 2022-09-12 PROCEDURE — 81001 URINALYSIS AUTO W/SCOPE: CPT | Performed by: EMERGENCY MEDICINE

## 2022-09-12 RX ORDER — MECLIZINE HYDROCHLORIDE 25 MG/1
25 TABLET ORAL 3 TIMES DAILY PRN
Qty: 30 TABLET | Refills: 0 | Status: SHIPPED | OUTPATIENT
Start: 2022-09-12

## 2022-09-12 NOTE — ED PROVIDER NOTES
History  Chief Complaint   Patient presents with    High Blood Pressure     States was eating a lot of pop corn at the theater yesterday- also has dental pain right upper tooth-medication from friend taken not know name but was for an infection; states has had some dizziness yesterday and this morning; not sure if it from high blood pressure or tooth pain  States also has chronic sinus and moving head around does make dizziness worse  Patient is a 43-year-old male presenting for concerns of dizziness  He describes as combination of feeling lightheaded this difficulty concentrating as well as feeling that the room is spinning sometimes  Currently asymptomatic in terms of vertiginous symptoms  He has had no chest pain with this  He admits to eating a large amount of salty foods which occluded movie theater popcorn yesterday  He states he feels like he is swollen, and thinks he has had similar episodes in the past   He states he has a known history of hypertension hyperlipidemia  States he is compliant with his medications  No chest pain with this, no focal weakness numbness or tingling  No vision changes  He has had a mild frontal headache which is not the worst in his life and has gradual in onset  Nothing makes it significantly better, nothing makes it significantly worse  Prior to Admission Medications   Prescriptions Last Dose Informant Patient Reported? Taking?    Diclofenac Sodium (VOLTAREN) 1 % Unknown at Unknown time  Yes No   Sig: diclofenac 1 % topical gel   QUEtiapine (SEROquel) 50 mg tablet 9/11/2022 at Unknown time  No Yes   Sig: Take 1 tablet (50 mg total) by mouth daily at bedtime   albuterol (PROVENTIL HFA,VENTOLIN HFA) 90 mcg/act inhaler Not Taking at Unknown time  No No   Sig: Inhale 1 puff every 6 (six) hours as needed for wheezing   Patient not taking: Reported on 9/12/2022   atorvastatin (LIPITOR) 40 mg tablet 9/12/2022 at Unknown time  No Yes   Sig: Take 1 tablet (40 mg total) by mouth daily   cetirizine (ZyrTEC) 10 mg tablet Unknown at Unknown time  No No   Sig: TAKE 1 TABLET (10 MG TOTAL) BY MOUTH DAILY   fluticasone (FLONASE) 50 mcg/act nasal spray 2022 at Unknown time  No Yes   Si spray into each nostril daily   fluticasone-salmeterol (Advair HFA) 230-21 MCG/ACT inhaler Not Taking at Unknown time  No No   Sig: Inhale 2 puffs 2 (two) times a day Rinse mouth after use  Patient not taking: Reported on 2022   hydrochlorothiazide (HYDRODIURIL) 25 mg tablet Past Month at Unknown time  No Yes   Sig: TAKE 0 5 TABLETS (12 5 MG TOTAL) BY MOUTH DAILY   lisinopril (ZESTRIL) 40 mg tablet Past Week at Unknown time  No Yes   Sig: Take 1 tablet (40 mg total) by mouth daily Take 1 tablet by mouth once daily  metoprolol tartrate (LOPRESSOR) 50 mg tablet 2022 at Unknown time  No Yes   Sig: Take 1 tablet (50 mg total) by mouth every 12 (twelve) hours   montelukast (SINGULAIR) 10 mg tablet Unknown at Unknown time  No No   Sig: Take 1 tablet (10 mg total) by mouth daily at bedtime   tadalafil (CIALIS) 5 MG tablet Unknown at Unknown time  No No   Sig: Take 1 tablet (5 mg total) by mouth daily as needed for erectile dysfunction   triamcinolone (KENALOG) 0 1 % cream Not Taking at Unknown time  No No   Sig: Apply topically 2 (two) times a day   Patient not taking: Reported on 2022      Facility-Administered Medications: None       Past Medical History:   Diagnosis Date    Asthma     Hyperlipidemia     Hypertension     Sinus congestion        Past Surgical History:   Procedure Laterality Date    CARPAL TUNNEL RELEASE      right hand       History reviewed  No pertinent family history  I have reviewed and agree with the history as documented      E-Cigarette/Vaping    E-Cigarette Use Never User      E-Cigarette/Vaping Substances     Social History     Tobacco Use    Smoking status: Never Smoker    Smokeless tobacco: Never Used   Vaping Use    Vaping Use: Never used Substance Use Topics    Alcohol use: Never    Drug use: Never       Review of Systems   Constitutional: Negative  Negative for chills and fever  HENT: Negative  Negative for rhinorrhea, sore throat, trouble swallowing and voice change  Eyes: Negative  Negative for pain and visual disturbance  Respiratory: Negative  Negative for cough, shortness of breath and wheezing  Cardiovascular: Negative  Negative for chest pain and palpitations  Gastrointestinal: Negative for abdominal pain, diarrhea, nausea and vomiting  Genitourinary: Negative  Negative for dysuria and frequency  Musculoskeletal: Negative  Negative for neck pain and neck stiffness  Skin: Negative  Negative for rash  Neurological: Positive for dizziness, light-headedness and headaches  Negative for facial asymmetry, speech difficulty, weakness and numbness  Physical Exam  Physical Exam  Vitals and nursing note reviewed  Constitutional:       General: He is not in acute distress  Appearance: He is well-developed  HENT:      Head: Normocephalic and atraumatic  Eyes:      Conjunctiva/sclera: Conjunctivae normal       Pupils: Pupils are equal, round, and reactive to light  Neck:      Trachea: No tracheal deviation  Cardiovascular:      Rate and Rhythm: Normal rate and regular rhythm  Pulmonary:      Effort: Pulmonary effort is normal  No respiratory distress  Breath sounds: Normal breath sounds  No wheezing or rales  Abdominal:      General: Bowel sounds are normal  There is no distension  Palpations: Abdomen is soft  Tenderness: There is no abdominal tenderness  There is no guarding or rebound  Musculoskeletal:         General: No tenderness or deformity  Normal range of motion  Cervical back: Normal range of motion and neck supple  Skin:     General: Skin is warm and dry  Capillary Refill: Capillary refill takes less than 2 seconds  Findings: No rash     Neurological: General: No focal deficit present  Mental Status: He is alert and oriented to person, place, and time  GCS: GCS eye subscore is 4  GCS verbal subscore is 5  GCS motor subscore is 6  Cranial Nerves: Cranial nerves are intact  Sensory: Sensation is intact  Motor: Motor function is intact  Coordination: Coordination is intact  Gait: Gait is intact  Psychiatric:         Behavior: Behavior normal          Vital Signs  ED Triage Vitals [09/12/22 0936]   Temperature Pulse Respirations Blood Pressure SpO2   98 4 °F (36 9 °C) 80 16 (!) 197/108 96 %      Temp Source Heart Rate Source Patient Position - Orthostatic VS BP Location FiO2 (%)   Oral Monitor Sitting Left arm --      Pain Score       8           Vitals:    09/12/22 0936   BP: (!) 197/108   Pulse: 80   Patient Position - Orthostatic VS: Sitting         Visual Acuity      ED Medications  Medications - No data to display    Diagnostic Studies  Results Reviewed     Procedure Component Value Units Date/Time    Fingerstick Glucose (POCT) [[id number]]  (Normal) Collected: 09/12/22 1117    Lab Status: Final result Updated: 09/12/22 1119     POC Glucose 100 mg/dl     UA (URINE) with reflex to Scope [[id number]]  (Abnormal) Collected: 09/12/22 1047    Lab Status: Final result Specimen: Urine, Clean Catch Updated: 09/12/22 1110     Color, UA Straw     Clarity, UA Clear     Specific Gravity, UA 1 015     pH, UA 6 0     Leukocytes, UA Negative     Nitrite, UA Negative     Protein, UA Negative mg/dl      Glucose, UA Negative mg/dl      Ketones, UA Negative mg/dl      Bilirubin, UA Negative     Occult Blood, UA 25 0     UROBILINOGEN UA Negative mg/dL     Urine Microscopic [[id number]] Collected: 09/12/22 1047    Lab Status:  In process Specimen: Urine, Clean Catch Updated: 09/12/22 1105    HS Troponin I 2hr [[id number]]     Lab Status: No result Specimen: Blood     HS Troponin 0hr (reflex protocol) [[id number]]  (Normal) Collected: 09/12/22 1007 Lab Status: Final result Specimen: Blood from Arm, Right Updated: 09/12/22 1038     hs TnI 0hr 5 ng/L     NT-BNP PRO [[id number]]  (Normal) Collected: 09/12/22 1007    Lab Status: Final result Specimen: Blood from Arm, Right Updated: 09/12/22 1035     NT-proBNP 67 0 pg/mL     Lipase [[id number]]  (Normal) Collected: 09/12/22 1007    Lab Status: Final result Specimen: Blood from Arm, Right Updated: 09/12/22 1033     Lipase 51 u/L     Comprehensive metabolic panel [[id number]]  (Abnormal) Collected: 09/12/22 1007    Lab Status: Final result Specimen: Blood from Arm, Right Updated: 09/12/22 1033     Sodium 138 mmol/L      Potassium 4 3 mmol/L      Chloride 103 mmol/L      CO2 31 mmol/L      ANION GAP 4 mmol/L      BUN 13 mg/dL      Creatinine 0 86 mg/dL      Glucose 119 mg/dL      Calcium 8 7 mg/dL      AST 37 U/L      ALT 56 U/L      Alkaline Phosphatase 84 U/L      Total Protein 7 8 g/dL      Albumin 4 0 g/dL      Total Bilirubin 0 44 mg/dL      eGFR 95 ml/min/1 73sq m     Narrative:      [place] guidelines for Chronic Kidney Disease (CKD):     Stage 1 with normal or high GFR (GFR > 90 mL/min/1 73 square meters)    Stage 2 Mild CKD (GFR = 60-89 mL/min/1 73 square meters)    Stage 3A Moderate CKD (GFR = 45-59 mL/min/1 73 square meters)    Stage 3B Moderate CKD (GFR = 30-44 mL/min/1 73 square meters)    Stage 4 Severe CKD (GFR = 15-29 mL/min/1 73 square meters)    Stage 5 End Stage CKD (GFR <15 mL/min/1 73 square meters)  Note: GFR calculation is accurate only with a steady state creatinine    CBC and differential [[id number]] Collected: 09/12/22 1007    Lab Status: Final result Specimen: Blood from Arm, Right Updated: 09/12/22 1016     WBC 8 03 Thousand/uL      RBC 5 07 Million/uL      Hemoglobin 14 2 g/dL      Hematocrit 44 5 %      MCV 88 fL      MCH 28 0 pg      MCHC 31 9 g/dL      RDW 14 6 %      MPV 10 8 fL      Platelets 907 Thousands/uL      nRBC 0 /100 WBCs      Neutrophils Relative 66 %      Immat GRANS % 0 %      Lymphocytes Relative 23 %      Monocytes Relative 9 %      Eosinophils Relative 1 %      Basophils Relative 1 %      Neutrophils Absolute 5 32 Thousands/µL      Immature Grans Absolute 0 03 Thousand/uL      Lymphocytes Absolute 1 84 Thousands/µL      Monocytes Absolute 0 68 Thousand/µL      Eosinophils Absolute 0 11 Thousand/µL      Basophils Absolute 0 05 Thousands/µL                  No orders to display              Procedures  Procedures         ED Course  ED Course as of 09/12/22 1156   Mon Sep 12, 2022   1000 Procedure Note: EKG  Date/Time: 09/12/22 10:00 AM   Performed by: Chuck Navarrete  Authorized by: Chuck Navarrete  ECG interpreted by me, the ED Provider: yes   The EKG demonstrates:  Rate 70  Rhythm sinus  QTc 429  No ST elevations/depressions                 HEART Risk Score    Flowsheet Row Most Recent Value   Heart Score Risk Calculator    History 1 Filed at: 09/12/2022 1155   ECG 0 Filed at: 09/12/2022 1155   Age 1 Filed at: 09/12/2022 1155   Risk Factors 1 Filed at: 09/12/2022 1155   Troponin 0 Filed at: 09/12/2022 1155   HEART Score 3 Filed at: 09/12/2022 1155                                      MDM  Number of Diagnoses or Management Options  Hypertension  Light headed  Diagnosis management comments: Patient's symptoms have resolved, blood work okay  EKG unremarkable  Patient aware of the need for follow-up care and strict return precautions were reviewed         Amount and/or Complexity of Data Reviewed  Clinical lab tests: ordered and reviewed  Tests in the medicine section of CPT®: ordered and reviewed  Independent visualization of images, tracings, or specimens: yes        Disposition  Final diagnoses:   Hypertension   Light headed     Time reflects when diagnosis was documented in both MDM as applicable and the Disposition within this note     Time User Action Codes Description Comment    9/12/2022 11:34 AM Amara Zhao Add [I10] Hypertension 9/12/2022 11:38 AM Mychal Valencia Add [R42] Light headed       ED Disposition     ED Disposition   Discharge    Condition   Stable    Date/Time   Mon Sep 12, 2022 11:34 AM    Comment   Emeli Anthony discharge to home/self care  Follow-up Information     Follow up With Specialties Details Why Contact Info Additional [address] In 1 week  [address], [id number] [place] [address], [address], [address], [address] [place] [address]          Patient's Medications   Discharge Prescriptions    MECLIZINE (ANTIVERT) 25 MG TABLET    Take 1 tablet (25 mg total) by mouth 3 (three) times a day as needed for dizziness       Start Date: 9/12/2022 End Date: --       Order Dose: 25 mg       Quantity: 30 tablet    Refills: 0       No discharge procedures on file      PDMP Review     None          ED Provider  Electronically Signed by           Nereida Bravo DO  09/12/22 [id number]

## 2022-09-14 ENCOUNTER — CLINICAL SUPPORT (OUTPATIENT)
Dept: FAMILY MEDICINE CLINIC | Facility: CLINIC | Age: 57
End: 2022-09-14

## 2022-09-14 DIAGNOSIS — Z23 ENCOUNTER FOR IMMUNIZATION: Primary | ICD-10-CM

## 2022-09-14 PROCEDURE — 90471 IMMUNIZATION ADMIN: CPT | Performed by: FAMILY MEDICINE

## 2022-09-14 PROCEDURE — 90682 RIV4 VACC RECOMBINANT DNA IM: CPT | Performed by: FAMILY MEDICINE

## 2022-09-15 RX ORDER — ATORVASTATIN CALCIUM 40 MG/1
40 TABLET, FILM COATED ORAL DAILY
Qty: 30 TABLET | Refills: 2 | Status: SHIPPED | OUTPATIENT
Start: 2022-09-15

## 2022-10-12 DIAGNOSIS — J34.89 SINUS PRESSURE: ICD-10-CM

## 2022-10-13 RX ORDER — CETIRIZINE HYDROCHLORIDE 10 MG/1
10 TABLET ORAL DAILY
Qty: 30 TABLET | Refills: 1 | Status: SHIPPED | OUTPATIENT
Start: 2022-10-13

## 2022-10-18 ENCOUNTER — TELEPHONE (OUTPATIENT)
Dept: FAMILY MEDICINE CLINIC | Facility: CLINIC | Age: 57
End: 2022-10-18

## 2022-10-18 NOTE — TELEPHONE ENCOUNTER
10/18/22     Called pt and No Answer  Left Detailed Message  If pt contacts office, please assist pt with confirming appt on 10/19/22 or if pt request to reschedule or cancel

## 2022-10-30 DIAGNOSIS — J34.89 SINUS PRESSURE: ICD-10-CM

## 2022-11-01 RX ORDER — FLUTICASONE PROPIONATE 50 MCG
1 SPRAY, SUSPENSION (ML) NASAL DAILY
Qty: 16 G | Refills: 0 | Status: SHIPPED | OUTPATIENT
Start: 2022-11-01

## 2022-12-21 DIAGNOSIS — J34.89 SINUS PRESSURE: ICD-10-CM

## 2022-12-22 RX ORDER — FLUTICASONE PROPIONATE 50 MCG
1 SPRAY, SUSPENSION (ML) NASAL DAILY
Qty: 16 G | Refills: 0 | Status: SHIPPED | OUTPATIENT
Start: 2022-12-22

## 2023-01-02 PROBLEM — G56.00 CARPAL TUNNEL SYNDROME: Status: ACTIVE | Noted: 2023-01-02

## 2023-01-02 PROBLEM — M65.30 ACQUIRED TRIGGER FINGER: Status: ACTIVE | Noted: 2023-01-02

## 2023-01-02 PROBLEM — M79.641 PAIN IN RIGHT HAND: Status: ACTIVE | Noted: 2023-01-02

## 2023-01-02 PROBLEM — G47.33 MODERATE OBSTRUCTIVE SLEEP APNEA: Status: ACTIVE | Noted: 2021-06-29

## 2023-02-21 ENCOUNTER — VBI (OUTPATIENT)
Dept: ADMINISTRATIVE | Facility: OTHER | Age: 58
End: 2023-02-21

## 2023-02-27 DIAGNOSIS — I10 HYPERTENSION, UNSPECIFIED TYPE: ICD-10-CM

## 2023-02-28 RX ORDER — LISINOPRIL 40 MG/1
40 TABLET ORAL DAILY
Qty: 90 TABLET | Refills: 2 | Status: SHIPPED | OUTPATIENT
Start: 2023-02-28

## 2023-03-13 NOTE — PROGRESS NOTES
Name: Eber Suggs      : 1965      MRN: [id number]  Encounter Provider: Rex Cooper MD  Encounter Date: 3/15/2023   Encounter department: [address] [place] [address]     1  Hypertension, unspecified type  Assessment & Plan:  Blood Pressure: 132/64   Current Medications: NORVASC 5 mg, Lisinopril 40 mg tablet daily (unsure if he is taking Lopressor or HCTZ)  Blood pressure is controlled on present treatment regimen  Patient misses no doses and tolerates the meds without side effects  Patient avoids salt  PLAN:   • Discussed diet, exercise and weight control  • No change in present meds  Continue HBPM   • Medication review to see which bp meds he is actually taking in one week      Orders:  -     lisinopril (ZESTRIL) 40 mg tablet; Take 1 tablet (40 mg total) by mouth daily Take 1 tablet by mouth once daily  -     amLODIPine (NORVASC) 5 mg tablet; Take 1 tablet (5 mg total) by mouth daily    2  Mild intermittent asthma, unspecified whether complicated  Assessment & Plan:  Asthma is well controlled     Daily medications include Advair, Albuterol, and Singulair  PLAN:   - Will continue current medications     Orders:  -     montelukast (SINGULAIR) 10 mg tablet; Take 1 tablet (10 mg total) by mouth daily at bedtime  -     fluticasone-salmeterol (Advair HFA) 230-21 MCG/ACT inhaler; Inhale 2 puffs 2 (two) times a day Rinse mouth after use  -     albuterol (PROVENTIL HFA,VENTOLIN HFA) 90 mcg/act inhaler; Inhale 1 puff every 6 (six) hours as needed for wheezing    3  Body mass index (BMI) 32 0-32 9, adult  Assessment & Plan:  Is concerned about his weight and asked about Wellbutrin for weight loss  Patient has not tried any dietary changes as he works long hours and is difficult for him      PLAN:   - Will repeat blood tests as patient has abnormal blood sugars  - Will consider Wellbutrin or Ozempic based on results  - Education on diet and exercise were given to patient      Orders:  -     Hemoglobin A1C; Future    4  Peripheral edema  -     hydrochlorothiazide (HYDRODIURIL) 25 mg tablet; Take 0 5 tablets (12 5 mg total) by mouth daily    5  Sinus pressure  -     cetirizine (ZyrTEC) 10 mg tablet; Take 1 tablet (10 mg total) by mouth daily    6  Hyperlipidemia, unspecified hyperlipidemia type  -     atorvastatin (LIPITOR) 40 mg tablet; Take 1 tablet (40 mg total) by mouth daily  -     Lipid Panel with Direct LDL reflex; Future    7  Other insomnia  -     hydrOXYzine HCL (ATARAX) 50 mg tablet; Take 1 tablet (50 mg total) by mouth 3 (three) times a day as needed for itching    8  Screening for hypothyroidism  -     TSH, 3rd generation with Free T4 reflex; Future    9  Blood glucose abnormal  -     Hemoglobin A1C; Future    10  Colon cancer screening  -     Cologuard      BMI Counseling: There is no height or weight on file to calculate BMI  The BMI is above normal  Nutrition recommendations include decreasing portion sizes, encouraging healthy choices of fruits and vegetables, decreasing fast food intake, consuming healthier snacks, limiting drinks that contain sugar, moderation in carbohydrate intake, increasing intake of lean protein, reducing intake of saturated and trans fat and reducing intake of cholesterol  Exercise recommendations include moderate physical activity 150 minutes/week, exercising 3-5 times per week and obtaining a gym membership  No pharmacotherapy was ordered  Rationale for BMI follow-up plan is due to patient being overweight or obese  Adiel Barry is a 59 y o  male with pmhx of htn and asthma presenting today for follow up  Patient reports that he is doing well but is concerned about his weight  He is struggling to lose weight at this time  Patient is also unsure of which medications he is taking   Patient to return in one week with his medications for medications review    Review of Systems Constitutional: Negative for chills and fever  HENT: Negative for ear pain and sore throat  Eyes: Negative for pain and visual disturbance  Respiratory: Negative for cough and shortness of breath  Cardiovascular: Negative for chest pain and palpitations  Gastrointestinal: Negative for abdominal pain and vomiting  Genitourinary: Negative for dysuria and hematuria  Musculoskeletal: Negative for arthralgias and back pain  Skin: Negative for color change and rash  Neurological: Negative for seizures and syncope  All other systems reviewed and are negative  Past Medical History:   Diagnosis Date   • Asthma    • Hyperlipidemia    • Hypertension    • Sinus congestion      Past Surgical History:   Procedure Laterality Date   • CARPAL TUNNEL RELEASE      right hand     No family history on file  Social History     Socioeconomic History   • Marital status: Single     Spouse name: None   • Number of children: None   • Years of education: None   • Highest education level: None   Occupational History   • None   Tobacco Use   • Smoking status: Never   • Smokeless tobacco: Never   Vaping Use   • Vaping Use: Never used   Substance and Sexual Activity   • Alcohol use: Never   • Drug use: Never   • Sexual activity: None   Other Topics Concern   • None   Social History Narrative   • None     Social Determinants of Health     Financial Resource Strain: Low Risk    • Difficulty of Paying Living Expenses: Not hard at all   Food Insecurity: No Food Insecurity   • Worried About Running Out of Food in the Last Year: Never true   • Ran Out of Food in the Last Year: Never true   Transportation Needs: Unmet Transportation Needs   • Lack of Transportation (Medical):  Yes   • Lack of Transportation (Non-Medical): Yes   Physical Activity: Not on file   Stress: Not on file   Social Connections: Not on file   Intimate Partner Violence: Not on file   Housing Stability: Not on file     Current Outpatient Medications on File Prior to Visit   Medication Sig   • tadalafil (CIALIS) 5 MG tablet Take 1 tablet (5 mg total) by mouth daily as needed for erectile dysfunction   • [DISCONTINUED] albuterol (PROVENTIL HFA,VENTOLIN HFA) 90 mcg/act inhaler Inhale 1 puff every 6 (six) hours as needed for wheezing (Patient not taking: Reported on 9/12/2022)   • [DISCONTINUED] amLODIPine (NORVASC) 5 mg tablet Take 5 mg by mouth daily   • [DISCONTINUED] atorvastatin (LIPITOR) 40 mg tablet Take 1 tablet (40 mg total) by mouth daily   • [DISCONTINUED] cetirizine (ZyrTEC) 10 mg tablet TAKE 1 TABLET (10 MG TOTAL) BY MOUTH DAILY   • [DISCONTINUED] Diclofenac Sodium (VOLTAREN) 1 % diclofenac 1 % topical gel   • [DISCONTINUED] fluticasone (FLONASE) 50 mcg/act nasal spray 1 SPRAY INTO EACH NOSTRIL DAILY   • [DISCONTINUED] fluticasone-salmeterol (Advair HFA) 230-21 MCG/ACT inhaler Inhale 2 puffs 2 (two) times a day Rinse mouth after use  (Patient not taking: Reported on 9/12/2022)   • [DISCONTINUED] hydrochlorothiazide (HYDRODIURIL) 25 mg tablet TAKE 0 5 TABLETS (12 5 MG TOTAL) BY MOUTH DAILY   • [DISCONTINUED] lisinopril (ZESTRIL) 40 mg tablet TAKE 1 TABLET (40 MG TOTAL) BY MOUTH DAILY TAKE 1 TABLET BY MOUTH ONCE DAILY     • [DISCONTINUED] meclizine (ANTIVERT) 25 mg tablet Take 1 tablet (25 mg total) by mouth 3 (three) times a day as needed for dizziness   • [DISCONTINUED] metoprolol tartrate (LOPRESSOR) 50 mg tablet Take 1 tablet (50 mg total) by mouth every 12 (twelve) hours   • [DISCONTINUED] montelukast (SINGULAIR) 10 mg tablet Take 1 tablet (10 mg total) by mouth daily at bedtime   • [DISCONTINUED] QUEtiapine (SEROquel) 50 mg tablet Take 1 tablet (50 mg total) by mouth daily at bedtime   • [DISCONTINUED] triamcinolone (KENALOG) 0 1 % cream Apply topically 2 (two) times a day (Patient not taking: Reported on 9/12/2022)     No Known Allergies  Immunization History   Administered Date(s) Administered   • COVID-19 MODERNA VACC 0 5 ML IM 04/21/2021, 05/21/2021   • COVID-19, unspecified 04/21/2021, 05/21/2021   • INFLUENZA 11/22/2017, 10/15/2020, 09/14/2022   • Influenza Injectable, MDCK, Preservative Free, Quadrivalent, 0 5 mL 11/22/2017   • Influenza, recombinant, quadrivalent,injectable, preservative free 09/14/2022   • Pneumococcal Conjugate Vaccine 20-valent (Pcv20), Polysace 08/16/2022   • Tdap 08/16/2022       Objective     /64 (BP Location: Right arm, Patient Position: Sitting, Cuff Size: Large)   Pulse 104   Temp (!) 96 9 °F (36 1 °C) (Temporal)   Resp 18   Ht 5' 6" (1 676 m)   Wt 90 2 kg (198 lb 12 8 oz)   SpO2 98%   BMI 32 09 kg/m²     Physical Exam  Constitutional:       Appearance: Normal appearance  HENT:      Head: Normocephalic and atraumatic  Mouth/Throat:      Mouth: Mucous membranes are moist       Pharynx: Oropharynx is clear  Cardiovascular:      Rate and Rhythm: Normal rate and regular rhythm  Pulses: Normal pulses  Heart sounds: Normal heart sounds  Pulmonary:      Effort: Pulmonary effort is normal       Breath sounds: Normal breath sounds  Abdominal:      General: Bowel sounds are normal       Palpations: Abdomen is soft  Skin:     General: Skin is warm  Neurological:      Mental Status: He is alert and oriented to person, place, and time     Psychiatric:         Mood and Affect: Mood normal        Dinah Andujar MD

## 2023-03-14 NOTE — ASSESSMENT & PLAN NOTE
Asthma is well controlled     Daily medications include Advair, Albuterol, and Singulair        PLAN:   - Will continue current medications

## 2023-03-14 NOTE — ASSESSMENT & PLAN NOTE
Blood Pressure: 132/64   Current Medications: NORVASC 5 mg, Lisinopril 40 mg tablet daily (unsure if he is taking Lopressor or HCTZ)  Blood pressure is controlled on present treatment regimen  Patient misses no doses and tolerates the meds without side effects  Patient avoids salt  PLAN:   • Discussed diet, exercise and weight control  • No change in present meds   Continue HBPM   • Medication review to see which bp meds he is actually taking in one week

## 2023-03-15 ENCOUNTER — OFFICE VISIT (OUTPATIENT)
Dept: FAMILY MEDICINE CLINIC | Facility: CLINIC | Age: 58
End: 2023-03-15

## 2023-03-15 VITALS
RESPIRATION RATE: 18 BRPM | HEIGHT: 66 IN | OXYGEN SATURATION: 98 % | DIASTOLIC BLOOD PRESSURE: 64 MMHG | WEIGHT: 198.8 LBS | BODY MASS INDEX: 31.95 KG/M2 | HEART RATE: 104 BPM | SYSTOLIC BLOOD PRESSURE: 132 MMHG | TEMPERATURE: 96.9 F

## 2023-03-15 DIAGNOSIS — G47.09 OTHER INSOMNIA: ICD-10-CM

## 2023-03-15 DIAGNOSIS — Z13.29 SCREENING FOR HYPOTHYROIDISM: ICD-10-CM

## 2023-03-15 DIAGNOSIS — R60.9 PERIPHERAL EDEMA: ICD-10-CM

## 2023-03-15 DIAGNOSIS — R73.09 BLOOD GLUCOSE ABNORMAL: ICD-10-CM

## 2023-03-15 DIAGNOSIS — E78.5 HYPERLIPIDEMIA, UNSPECIFIED HYPERLIPIDEMIA TYPE: ICD-10-CM

## 2023-03-15 DIAGNOSIS — Z12.11 COLON CANCER SCREENING: ICD-10-CM

## 2023-03-15 DIAGNOSIS — J45.20 MILD INTERMITTENT ASTHMA, UNSPECIFIED WHETHER COMPLICATED: ICD-10-CM

## 2023-03-15 DIAGNOSIS — I10 HYPERTENSION, UNSPECIFIED TYPE: Primary | ICD-10-CM

## 2023-03-15 DIAGNOSIS — J34.89 SINUS PRESSURE: ICD-10-CM

## 2023-03-15 RX ORDER — HYDROXYZINE 50 MG/1
50 TABLET, FILM COATED ORAL 3 TIMES DAILY PRN
Qty: 30 TABLET | Refills: 0 | Status: SHIPPED | OUTPATIENT
Start: 2023-03-15 | End: 2023-03-22

## 2023-03-15 RX ORDER — FLUTICASONE PROPIONATE AND SALMETEROL XINAFOATE 230; 21 UG/1; UG/1
2 AEROSOL, METERED RESPIRATORY (INHALATION) 2 TIMES DAILY
Qty: 12 G | Refills: 2 | Status: SHIPPED | OUTPATIENT
Start: 2023-03-15

## 2023-03-15 RX ORDER — CETIRIZINE HYDROCHLORIDE 10 MG/1
10 TABLET ORAL DAILY
Qty: 30 TABLET | Refills: 1 | Status: SHIPPED | OUTPATIENT
Start: 2023-03-15

## 2023-03-15 RX ORDER — ATORVASTATIN CALCIUM 40 MG/1
40 TABLET, FILM COATED ORAL DAILY
Qty: 90 TABLET | Refills: 0 | Status: SHIPPED | OUTPATIENT
Start: 2023-03-15 | End: 2023-06-13

## 2023-03-15 RX ORDER — AMLODIPINE BESYLATE 5 MG/1
5 TABLET ORAL DAILY
Qty: 90 TABLET | Refills: 0 | Status: SHIPPED | OUTPATIENT
Start: 2023-03-15 | End: 2023-06-13

## 2023-03-15 RX ORDER — ALBUTEROL SULFATE 90 UG/1
1 AEROSOL, METERED RESPIRATORY (INHALATION) EVERY 6 HOURS PRN
Qty: 18 G | Refills: 2 | Status: SHIPPED | OUTPATIENT
Start: 2023-03-15

## 2023-03-15 RX ORDER — MONTELUKAST SODIUM 10 MG/1
10 TABLET ORAL
Qty: 90 TABLET | Refills: 3 | Status: SHIPPED | OUTPATIENT
Start: 2023-03-15

## 2023-03-15 RX ORDER — HYDROCHLOROTHIAZIDE 25 MG/1
12.5 TABLET ORAL DAILY
Qty: 45 TABLET | Refills: 0 | Status: SHIPPED | OUTPATIENT
Start: 2023-03-15 | End: 2023-06-13

## 2023-03-15 RX ORDER — LISINOPRIL 40 MG/1
40 TABLET ORAL DAILY
Qty: 90 TABLET | Refills: 2 | Status: SHIPPED | OUTPATIENT
Start: 2023-03-15

## 2023-03-15 NOTE — ASSESSMENT & PLAN NOTE
Is concerned about his weight and asked about Wellbutrin for weight loss  Patient has not tried any dietary changes as he works long hours and is difficult for him      PLAN:   - Will repeat blood tests as patient has abnormal blood sugars  - Will consider Wellbutrin or Ozempic based on results  - Education on diet and exercise were given to patient

## 2023-03-20 ENCOUNTER — APPOINTMENT (OUTPATIENT)
Dept: LAB | Facility: CLINIC | Age: 58
End: 2023-03-20

## 2023-03-20 DIAGNOSIS — R73.09 BLOOD GLUCOSE ABNORMAL: ICD-10-CM

## 2023-03-20 DIAGNOSIS — Z13.29 SCREENING FOR HYPOTHYROIDISM: ICD-10-CM

## 2023-03-20 DIAGNOSIS — E78.5 HYPERLIPIDEMIA, UNSPECIFIED HYPERLIPIDEMIA TYPE: ICD-10-CM

## 2023-03-20 LAB
CHOLEST SERPL-MCNC: 210 MG/DL
HDLC SERPL-MCNC: 49 MG/DL
LDLC SERPL CALC-MCNC: 136 MG/DL (ref 0–100)
TRIGL SERPL-MCNC: 127 MG/DL
TSH SERPL DL<=0.05 MIU/L-ACNC: 1.44 UIU/ML (ref 0.45–4.5)

## 2023-03-21 PROBLEM — Z71.89 ENCOUNTER FOR MEDICATION REVIEW AND COUNSELING: Status: ACTIVE | Noted: 2023-03-21

## 2023-03-21 LAB
EST. AVERAGE GLUCOSE BLD GHB EST-MCNC: 128 MG/DL
HBA1C MFR BLD: 6.1 %

## 2023-03-22 ENCOUNTER — OFFICE VISIT (OUTPATIENT)
Dept: FAMILY MEDICINE CLINIC | Facility: CLINIC | Age: 58
End: 2023-03-22

## 2023-03-22 VITALS
HEART RATE: 103 BPM | HEIGHT: 66 IN | OXYGEN SATURATION: 99 % | TEMPERATURE: 98.7 F | DIASTOLIC BLOOD PRESSURE: 80 MMHG | SYSTOLIC BLOOD PRESSURE: 130 MMHG | WEIGHT: 200 LBS | RESPIRATION RATE: 16 BRPM | BODY MASS INDEX: 32.14 KG/M2

## 2023-03-22 DIAGNOSIS — E78.5 DYSLIPIDEMIA: ICD-10-CM

## 2023-03-22 DIAGNOSIS — I10 HYPERTENSION, UNSPECIFIED TYPE: ICD-10-CM

## 2023-03-22 DIAGNOSIS — Z71.89 ENCOUNTER FOR MEDICATION REVIEW AND COUNSELING: Primary | ICD-10-CM

## 2023-03-22 NOTE — PROGRESS NOTES
Name: Marcia Mackey      : 1965      MRN: [id number]  Encounter Provider: Zoraida Hernández MD  Encounter Date: 3/22/2023   Encounter department: [address] [place] [address]     1  Encounter for medication review and counseling    2  Dyslipidemia  Comments: All medications were reviewed and medication list updated  Assessment & Plan:  Lab Results   Component Value Date    CHOLESTEROL 210 (H) 2023    CHOLESTEROL 208 (H) 2021       Elevated LDL and total cholesterol at last lab 3/20/2023  ASCVD risk 10 4%  Current medication Lipitor 40 mg    PLAN  - Continue Lipitor 40 mg  - Education on low cholesterol diet was provided  - can consider Weight management referral if no improvement with weight at next visit      3  Hypertension, unspecified type  Assessment & Plan:  Has had episodes of dizziness  Today bp is good Blood Pressure: 130/80     PLAN:   - Will send BP monitoring for patient to take when he feels dizzy  - Follow up in three months or if dizziness occurs follow up immediately to review   - advised to monitor and increasing water intake and monitor salt intake    Orders:  -     Blood Pressure Monitoring ([facility] BP Monitor/Arm) KLELY; Use daily as needed (dizziness)           Subjective     Marcia Mackey is a 59 y o  male with pmhx of asthma, htn and cabrera presenting today medication review  Patiently currently on approx 10 medications, however does not know which ones  Patient is here with his medications to review all     Review of Systems   Constitutional: Negative for chills and fever  HENT: Negative for ear pain and sore throat  Eyes: Negative for pain and visual disturbance  Respiratory: Negative for cough and shortness of breath  Cardiovascular: Negative for chest pain and palpitations  Gastrointestinal: Negative for abdominal pain and vomiting  Genitourinary: Negative for dysuria and hematuria  Musculoskeletal: Negative for arthralgias and back pain  Skin: Negative for color change and rash  Neurological: Negative for seizures and syncope  All other systems reviewed and are negative  Past Medical History:   Diagnosis Date   • Asthma    • Hyperlipidemia    • Hypertension    • Sinus congestion      Past Surgical History:   Procedure Laterality Date   • CARPAL TUNNEL RELEASE      right hand     No family history on file  Social History     Socioeconomic History   • Marital status: Single     Spouse name: None   • Number of children: None   • Years of education: None   • Highest education level: None   Occupational History   • None   Tobacco Use   • Smoking status: Never   • Smokeless tobacco: Never   Vaping Use   • Vaping Use: Never used   Substance and Sexual Activity   • Alcohol use: Never   • Drug use: Never   • Sexual activity: None   Other Topics Concern   • None   Social History Narrative   • None     Social Determinants of Health     Financial Resource Strain: Low Risk    • Difficulty of Paying Living Expenses: Not hard at all   Food Insecurity: No Food Insecurity   • Worried About Running Out of Food in the Last Year: Never true   • Ran Out of Food in the Last Year: Never true   Transportation Needs: Unmet Transportation Needs   • Lack of Transportation (Medical):  Yes   • Lack of Transportation (Non-Medical): Yes   Physical Activity: Not on file   Stress: Not on file   Social Connections: Not on file   Intimate Partner Violence: Not on file   Housing Stability: Not on file     Current Outpatient Medications on File Prior to Visit   Medication Sig   • albuterol (PROVENTIL HFA,VENTOLIN HFA) 90 mcg/act inhaler Inhale 1 puff every 6 (six) hours as needed for wheezing   • amLODIPine (NORVASC) 5 mg tablet Take 1 tablet (5 mg total) by mouth daily   • atorvastatin (LIPITOR) 40 mg tablet Take 1 tablet (40 mg total) by mouth daily   • cetirizine (ZyrTEC) 10 mg tablet Take 1 tablet (10 mg total) by mouth daily   • fluticasone-salmeterol (Advair HFA) 230-21 MCG/ACT inhaler Inhale 2 puffs 2 (two) times a day Rinse mouth after use  • hydrochlorothiazide (HYDRODIURIL) 25 mg tablet Take 0 5 tablets (12 5 mg total) by mouth daily   • lisinopril (ZESTRIL) 40 mg tablet Take 1 tablet (40 mg total) by mouth daily Take 1 tablet by mouth once daily  • montelukast (SINGULAIR) 10 mg tablet Take 1 tablet (10 mg total) by mouth daily at bedtime   • [DISCONTINUED] hydrOXYzine HCL (ATARAX) 50 mg tablet Take 1 tablet (50 mg total) by mouth 3 (three) times a day as needed for itching   • [DISCONTINUED] tadalafil (CIALIS) 5 MG tablet Take 1 tablet (5 mg total) by mouth daily as needed for erectile dysfunction     No Known Allergies  Immunization History   Administered Date(s) Administered   • COVID-19 MODERNA VACC 0 5 ML IM 04/21/2021, 05/21/2021   • COVID-19, unspecified 04/21/2021, 05/21/2021   • INFLUENZA 11/22/2017, 10/15/2020, 09/14/2022   • Influenza Injectable, MDCK, Preservative Free, Quadrivalent, 0 5 mL 11/22/2017   • Influenza, recombinant, quadrivalent,injectable, preservative free 09/14/2022   • Pneumococcal Conjugate Vaccine 20-valent (Pcv20), Polysace 08/16/2022   • Tdap 08/16/2022       Objective     /80 (BP Location: Right arm, Patient Position: Sitting, Cuff Size: Large)   Pulse 103   Temp 98 7 °F (37 1 °C) (Temporal)   Resp 16   Ht 5' 6" (1 676 m)   Wt 90 7 kg (200 lb)   SpO2 99%   BMI 32 28 kg/m²     Physical Exam  Constitutional:       Appearance: Normal appearance  HENT:      Head: Normocephalic and atraumatic  Mouth/Throat:      Mouth: Mucous membranes are moist       Pharynx: Oropharynx is clear  Cardiovascular:      Rate and Rhythm: Normal rate and regular rhythm  Pulses: Normal pulses  Heart sounds: Normal heart sounds  Pulmonary:      Effort: Pulmonary effort is normal       Breath sounds: Normal breath sounds     Abdominal:      General: Bowel sounds are normal  Palpations: Abdomen is soft  Skin:     General: Skin is warm  Neurological:      Mental Status: He is alert and oriented to person, place, and time     Psychiatric:         Mood and Affect: Mood normal        Alona Hall MD

## 2023-03-22 NOTE — PATIENT INSTRUCTIONS
Foods to eat:  Foods rich in unsaturated fats: e g  olive and canola oil, avocados, almonds, pecans, walnuts  Low carbohydrate and fiber rich foods: e g  whole grains, whole wheat bread and pasta, oatmeal, oat bran, brown rice, barley, legumes  Fatty fish: e g  salmon, herring  phytosterol's and stanols: e g  nuts, seeds, vegetable oils, fortified food products such as orange juice, yogurt, salad dressing      Foods to avoid:  High cholesterol foods: e g  egg yolks, whole milk products, and organ meats  Fried foods and fast food  Canola oil and Other processed vegetable oils  Potato chips and Other packaged Foods  Laurann Glassing and Other processed meats

## 2023-03-22 NOTE — ASSESSMENT & PLAN NOTE
Lab Results   Component Value Date    CHOLESTEROL 210 (H) 03/20/2023    CHOLESTEROL 208 (H) 02/08/2021       Elevated LDL and total cholesterol at last lab 3/20/2023  ASCVD risk 10 4%  Current medication Lipitor 40 mg    PLAN  - Continue Lipitor 40 mg  - Education on low cholesterol diet was provided  - can consider Weight management referral if no improvement with weight at next visit

## 2023-03-22 NOTE — ASSESSMENT & PLAN NOTE
Has had episodes of dizziness     Today bp is good Blood Pressure: 130/80     PLAN:   - Will send BP monitoring for patient to take when he feels dizzy  - Follow up in three months or if dizziness occurs follow up immediately to review   - advised to monitor and increasing water intake and monitor salt intake

## 2023-05-06 DIAGNOSIS — J45.20 MILD INTERMITTENT ASTHMA, UNSPECIFIED WHETHER COMPLICATED: ICD-10-CM

## 2023-05-07 RX ORDER — ALBUTEROL SULFATE 90 UG/1
1 AEROSOL, METERED RESPIRATORY (INHALATION) EVERY 6 HOURS PRN
Qty: 8.5 G | Refills: 0 | Status: SHIPPED | OUTPATIENT
Start: 2023-05-07

## 2023-07-27 DIAGNOSIS — I10 HYPERTENSION, UNSPECIFIED TYPE: ICD-10-CM

## 2023-07-27 DIAGNOSIS — J45.20 MILD INTERMITTENT ASTHMA, UNSPECIFIED WHETHER COMPLICATED: ICD-10-CM

## 2023-07-27 DIAGNOSIS — R60.9 PERIPHERAL EDEMA: ICD-10-CM

## 2023-07-28 RX ORDER — FLUTICASONE PROPIONATE AND SALMETEROL XINAFOATE 230; 21 UG/1; UG/1
2 AEROSOL, METERED RESPIRATORY (INHALATION) 2 TIMES DAILY
Qty: 24 G | Refills: 3 | Status: SHIPPED | OUTPATIENT
Start: 2023-07-28

## 2023-07-28 RX ORDER — ALBUTEROL SULFATE 90 UG/1
1 AEROSOL, METERED RESPIRATORY (INHALATION) EVERY 6 HOURS PRN
Qty: 8.5 G | Refills: 0 | Status: SHIPPED | OUTPATIENT
Start: 2023-07-28

## 2023-07-28 RX ORDER — HYDROCHLOROTHIAZIDE 25 MG/1
12.5 TABLET ORAL DAILY
Qty: 45 TABLET | Refills: 0 | Status: SHIPPED | OUTPATIENT
Start: 2023-07-28 | End: 2023-10-26

## 2023-07-28 RX ORDER — AMLODIPINE BESYLATE 5 MG/1
5 TABLET ORAL DAILY
Qty: 90 TABLET | Refills: 0 | Status: SHIPPED | OUTPATIENT
Start: 2023-07-28 | End: 2023-10-26

## 2023-10-06 DIAGNOSIS — E78.5 HYPERLIPIDEMIA, UNSPECIFIED HYPERLIPIDEMIA TYPE: ICD-10-CM

## 2023-10-08 RX ORDER — ATORVASTATIN CALCIUM 40 MG/1
40 TABLET, FILM COATED ORAL DAILY
Qty: 90 TABLET | Refills: 0 | Status: SHIPPED | OUTPATIENT
Start: 2023-10-08 | End: 2024-01-06

## 2023-11-01 DIAGNOSIS — I10 HYPERTENSION, UNSPECIFIED TYPE: ICD-10-CM

## 2023-11-01 RX ORDER — LISINOPRIL 40 MG/1
40 TABLET ORAL DAILY
Qty: 90 TABLET | Refills: 2 | Status: SHIPPED | OUTPATIENT
Start: 2023-11-01

## 2023-11-03 DIAGNOSIS — R60.9 PERIPHERAL EDEMA: ICD-10-CM

## 2023-11-03 RX ORDER — HYDROCHLOROTHIAZIDE 25 MG/1
12.5 TABLET ORAL DAILY
Qty: 45 TABLET | Refills: 0 | Status: SHIPPED | OUTPATIENT
Start: 2023-11-03 | End: 2024-02-01

## 2023-11-10 DIAGNOSIS — E78.5 HYPERLIPIDEMIA, UNSPECIFIED HYPERLIPIDEMIA TYPE: ICD-10-CM

## 2023-11-10 DIAGNOSIS — I10 HYPERTENSION, UNSPECIFIED TYPE: ICD-10-CM

## 2023-11-10 DIAGNOSIS — R60.9 PERIPHERAL EDEMA: ICD-10-CM

## 2023-11-10 RX ORDER — AMLODIPINE BESYLATE 5 MG/1
5 TABLET ORAL DAILY
Qty: 90 TABLET | Refills: 1 | Status: SHIPPED | OUTPATIENT
Start: 2023-11-10 | End: 2024-05-08

## 2023-11-10 RX ORDER — ATORVASTATIN CALCIUM 40 MG/1
40 TABLET, FILM COATED ORAL DAILY
Qty: 90 TABLET | Refills: 1 | Status: SHIPPED | OUTPATIENT
Start: 2023-11-10 | End: 2024-05-08

## 2023-11-10 RX ORDER — HYDROCHLOROTHIAZIDE 25 MG/1
12.5 TABLET ORAL DAILY
Qty: 45 TABLET | Refills: 1 | Status: SHIPPED | OUTPATIENT
Start: 2023-11-10 | End: 2024-05-08

## 2025-01-14 ENCOUNTER — HOSPITAL ENCOUNTER (INPATIENT)
Facility: HOSPITAL | Age: 60
LOS: 2 days | Discharge: HOME | DRG: 175 | End: 2025-01-17
Attending: EMERGENCY MEDICINE | Admitting: INTERNAL MEDICINE
Payer: COMMERCIAL

## 2025-01-14 ENCOUNTER — APPOINTMENT (EMERGENCY)
Dept: CT IMAGING | Facility: HOSPITAL | Age: 60
DRG: 175 | End: 2025-01-14
Payer: COMMERCIAL

## 2025-01-14 DIAGNOSIS — R79.89 ELEVATED TROPONIN: Primary | ICD-10-CM

## 2025-01-14 DIAGNOSIS — R03.0 ELEVATED BLOOD PRESSURE READING: ICD-10-CM

## 2025-01-14 DIAGNOSIS — Z13.9 ENCOUNTER FOR SCREENING INVOLVING SOCIAL DETERMINANTS OF HEALTH (SDOH): ICD-10-CM

## 2025-01-14 DIAGNOSIS — R07.9 CHEST PAIN: ICD-10-CM

## 2025-01-14 DIAGNOSIS — I21.4 NSTEMI (NON-ST ELEVATED MYOCARDIAL INFARCTION) (HCC): ICD-10-CM

## 2025-01-14 LAB
2HR DELTA HS TROPONIN: 578 NG/L
4HR DELTA HS TROPONIN: 1521 NG/L
ALBUMIN SERPL BCG-MCNC: 4.1 G/DL (ref 3.5–5)
ALP SERPL-CCNC: 78 U/L (ref 34–104)
ALT SERPL W P-5'-P-CCNC: 34 U/L (ref 7–52)
ANION GAP SERPL CALCULATED.3IONS-SCNC: 9 MMOL/L (ref 4–13)
APTT PPP: 26 SECONDS (ref 23–34)
AST SERPL W P-5'-P-CCNC: 25 U/L (ref 13–39)
BASOPHILS # BLD AUTO: 0.03 THOUSANDS/ΜL (ref 0–0.1)
BASOPHILS NFR BLD AUTO: 0 % (ref 0–1)
BILIRUB SERPL-MCNC: 0.33 MG/DL (ref 0.2–1)
BUN SERPL-MCNC: 17 MG/DL (ref 5–25)
CALCIUM SERPL-MCNC: 9 MG/DL (ref 8.4–10.2)
CARDIAC TROPONIN I PNL SERPL HS: 1733 NG/L (ref ?–50)
CARDIAC TROPONIN I PNL SERPL HS: 212 NG/L (ref ?–50)
CARDIAC TROPONIN I PNL SERPL HS: 790 NG/L (ref ?–50)
CHLORIDE SERPL-SCNC: 102 MMOL/L (ref 96–108)
CO2 SERPL-SCNC: 27 MMOL/L (ref 21–32)
CREAT SERPL-MCNC: 1.11 MG/DL (ref 0.6–1.3)
D DIMER PPP FEU-MCNC: <0.27 UG/ML FEU
EOSINOPHIL # BLD AUTO: 0.06 THOUSAND/ΜL (ref 0–0.61)
EOSINOPHIL NFR BLD AUTO: 1 % (ref 0–6)
ERYTHROCYTE [DISTWIDTH] IN BLOOD BY AUTOMATED COUNT: 14.8 % (ref 11.6–15.1)
GFR SERPL CREATININE-BSD FRML MDRD: 71 ML/MIN/1.73SQ M
GLUCOSE SERPL-MCNC: 225 MG/DL (ref 65–140)
HCT VFR BLD AUTO: 43.8 % (ref 36.5–49.3)
HGB BLD-MCNC: 14.3 G/DL (ref 12–17)
IMM GRANULOCYTES # BLD AUTO: 0.03 THOUSAND/UL (ref 0–0.2)
IMM GRANULOCYTES NFR BLD AUTO: 0 % (ref 0–2)
INR PPP: 1.02 (ref 0.85–1.19)
LYMPHOCYTES # BLD AUTO: 2.82 THOUSANDS/ΜL (ref 0.6–4.47)
LYMPHOCYTES NFR BLD AUTO: 29 % (ref 14–44)
MCH RBC QN AUTO: 27.8 PG (ref 26.8–34.3)
MCHC RBC AUTO-ENTMCNC: 32.6 G/DL (ref 31.4–37.4)
MCV RBC AUTO: 85 FL (ref 82–98)
MONOCYTES # BLD AUTO: 0.71 THOUSAND/ΜL (ref 0.17–1.22)
MONOCYTES NFR BLD AUTO: 7 % (ref 4–12)
NEUTROPHILS # BLD AUTO: 6.21 THOUSANDS/ΜL (ref 1.85–7.62)
NEUTS SEG NFR BLD AUTO: 63 % (ref 43–75)
NRBC BLD AUTO-RTO: 0 /100 WBCS
PLATELET # BLD AUTO: 313 THOUSANDS/UL (ref 149–390)
PMV BLD AUTO: 11 FL (ref 8.9–12.7)
POTASSIUM SERPL-SCNC: 3.6 MMOL/L (ref 3.5–5.3)
PROT SERPL-MCNC: 7.5 G/DL (ref 6.4–8.4)
PROTHROMBIN TIME: 13.6 SECONDS (ref 12.3–15)
RBC # BLD AUTO: 5.15 MILLION/UL (ref 3.88–5.62)
SODIUM SERPL-SCNC: 138 MMOL/L (ref 135–147)
WBC # BLD AUTO: 9.86 THOUSAND/UL (ref 4.31–10.16)

## 2025-01-14 PROCEDURE — 99285 EMERGENCY DEPT VISIT HI MDM: CPT

## 2025-01-14 PROCEDURE — 83036 HEMOGLOBIN GLYCOSYLATED A1C: CPT

## 2025-01-14 PROCEDURE — 36415 COLL VENOUS BLD VENIPUNCTURE: CPT | Performed by: EMERGENCY MEDICINE

## 2025-01-14 PROCEDURE — 93005 ELECTROCARDIOGRAM TRACING: CPT

## 2025-01-14 PROCEDURE — 85379 FIBRIN DEGRADATION QUANT: CPT | Performed by: EMERGENCY MEDICINE

## 2025-01-14 PROCEDURE — 80053 COMPREHEN METABOLIC PANEL: CPT | Performed by: EMERGENCY MEDICINE

## 2025-01-14 PROCEDURE — 85730 THROMBOPLASTIN TIME PARTIAL: CPT | Performed by: EMERGENCY MEDICINE

## 2025-01-14 PROCEDURE — 85025 COMPLETE CBC W/AUTO DIFF WBC: CPT | Performed by: EMERGENCY MEDICINE

## 2025-01-14 PROCEDURE — 99285 EMERGENCY DEPT VISIT HI MDM: CPT | Performed by: EMERGENCY MEDICINE

## 2025-01-14 PROCEDURE — 71275 CT ANGIOGRAPHY CHEST: CPT

## 2025-01-14 PROCEDURE — 99223 1ST HOSP IP/OBS HIGH 75: CPT

## 2025-01-14 PROCEDURE — 85610 PROTHROMBIN TIME: CPT | Performed by: EMERGENCY MEDICINE

## 2025-01-14 PROCEDURE — 74174 CTA ABD&PLVS W/CONTRAST: CPT

## 2025-01-14 PROCEDURE — 84484 ASSAY OF TROPONIN QUANT: CPT | Performed by: EMERGENCY MEDICINE

## 2025-01-14 RX ORDER — ASPIRIN 81 MG/1
324 TABLET, CHEWABLE ORAL ONCE
Status: DISCONTINUED | OUTPATIENT
Start: 2025-01-14 | End: 2025-01-14

## 2025-01-14 RX ORDER — ASPIRIN 81 MG/1
324 TABLET, CHEWABLE ORAL ONCE
Status: COMPLETED | OUTPATIENT
Start: 2025-01-14 | End: 2025-01-14

## 2025-01-14 RX ORDER — HEPARIN SODIUM 1000 [USP'U]/ML
4000 INJECTION, SOLUTION INTRAVENOUS; SUBCUTANEOUS EVERY 6 HOURS PRN
Status: DISCONTINUED | OUTPATIENT
Start: 2025-01-14 | End: 2025-01-16

## 2025-01-14 RX ORDER — LABETALOL HYDROCHLORIDE 5 MG/ML
10 INJECTION, SOLUTION INTRAVENOUS ONCE
Status: DISCONTINUED | OUTPATIENT
Start: 2025-01-14 | End: 2025-01-14

## 2025-01-14 RX ORDER — ONDANSETRON 2 MG/ML
4 INJECTION INTRAMUSCULAR; INTRAVENOUS EVERY 6 HOURS PRN
Status: DISCONTINUED | OUTPATIENT
Start: 2025-01-14 | End: 2025-01-17 | Stop reason: HOSPADM

## 2025-01-14 RX ORDER — HEPARIN SODIUM 1000 [USP'U]/ML
2000 INJECTION, SOLUTION INTRAVENOUS; SUBCUTANEOUS EVERY 6 HOURS PRN
Status: DISCONTINUED | OUTPATIENT
Start: 2025-01-14 | End: 2025-01-16

## 2025-01-14 RX ORDER — MAGNESIUM HYDROXIDE/ALUMINUM HYDROXICE/SIMETHICONE 120; 1200; 1200 MG/30ML; MG/30ML; MG/30ML
30 SUSPENSION ORAL EVERY 6 HOURS PRN
Status: DISCONTINUED | OUTPATIENT
Start: 2025-01-14 | End: 2025-01-17 | Stop reason: HOSPADM

## 2025-01-14 RX ORDER — HEPARIN SODIUM 10000 [USP'U]/100ML
3-20 INJECTION, SOLUTION INTRAVENOUS
Status: DISCONTINUED | OUTPATIENT
Start: 2025-01-14 | End: 2025-01-16

## 2025-01-14 RX ORDER — HEPARIN SODIUM 1000 [USP'U]/ML
4000 INJECTION, SOLUTION INTRAVENOUS; SUBCUTANEOUS ONCE
Status: COMPLETED | OUTPATIENT
Start: 2025-01-14 | End: 2025-01-14

## 2025-01-14 RX ORDER — ACETAMINOPHEN 500 MG
1000 TABLET ORAL EVERY 6 HOURS PRN
COMMUNITY
End: 2025-01-20

## 2025-01-14 RX ORDER — ACETAMINOPHEN 325 MG/1
650 TABLET ORAL EVERY 6 HOURS PRN
Status: DISCONTINUED | OUTPATIENT
Start: 2025-01-14 | End: 2025-01-17 | Stop reason: HOSPADM

## 2025-01-14 RX ORDER — NITROGLYCERIN 0.4 MG/1
0.4 TABLET SUBLINGUAL ONCE
Status: COMPLETED | OUTPATIENT
Start: 2025-01-14 | End: 2025-01-14

## 2025-01-14 RX ADMIN — HEPARIN SODIUM 4000 UNITS: 1000 INJECTION, SOLUTION INTRAVENOUS; SUBCUTANEOUS at 22:43

## 2025-01-14 RX ADMIN — IOHEXOL 100 ML: 350 INJECTION, SOLUTION INTRAVENOUS at 21:10

## 2025-01-14 RX ADMIN — HEPARIN SODIUM 11.1 UNITS/KG/HR: 10000 INJECTION, SOLUTION INTRAVENOUS at 22:43

## 2025-01-14 RX ADMIN — ASPIRIN 81 MG CHEWABLE TABLET 324 MG: 81 TABLET CHEWABLE at 21:57

## 2025-01-14 RX ADMIN — NITROGLYCERIN 0.4 MG: 0.4 TABLET SUBLINGUAL at 21:20

## 2025-01-14 NOTE — ED PROVIDER NOTES
Time reflects when diagnosis was documented in both MDM as applicable and the Disposition within this note       Time User Action Codes Description Comment    1/14/2025 10:16 PM Ron Lesley ASHLEY Add [R79.89] Elevated troponin     1/14/2025 10:16 PM Lesley Velasquez ASHLEY Add [R07.9] Chest pain     1/14/2025 10:16 PM Ron Lesley ASHLEY Add [R03.0] Elevated blood pressure reading           ED Disposition       ED Disposition   Admit    Condition   Stable    Date/Time   Tue Jan 14, 2025 10:16 PM    Comment   Case was discussed with LIONEL and the patient's admission status was agreed to be Admission Status: inpatient status to the service of Dr. Daley .               Assessment & Plan       Medical Decision Making  58 yo M with R sided CP, very hypertensive on arrival- EKG to r/o STEMI/dysrhythmia/abn intervals/ischemic changes, troponin to eval for ischemia, CBC to assess for leukocytosis/anemia, CMP to assess for elevated LFTs/JUAN/electrolyte derangements, ddimer to r/o PE    Elevated troponin- concern for NSTEMI- ASA, Heparin. Admitted for further workup/management     Problems Addressed:  Chest pain: acute illness or injury  Elevated blood pressure reading: acute illness or injury  Elevated troponin: acute illness or injury    Amount and/or Complexity of Data Reviewed  External Data Reviewed: labs, radiology, ECG and notes.  Labs: ordered. Decision-making details documented in ED Course.  Radiology: ordered and independent interpretation performed. Decision-making details documented in ED Course.  ECG/medicine tests: ordered and independent interpretation performed. Decision-making details documented in ED Course.    Risk  OTC drugs.  Prescription drug management.  Decision regarding hospitalization.        ED Course as of 01/14/25 2351   Tue Jan 14, 2025   1902 EKG independently interpreted by myself:  sinus tachycardia @ 112 bpm, normal axis normal intervals qtc 425, no sig st changes   1924 R arm 179/87... L arm 185/112   1949 hs  TnI 0hr(!): 212 1950 D-Dimer, Quant: <0.27  Can r/o PE   2002 GLUCOSE(!): 225   2017 Blood Pressure: 164/85  Improved prior to meds   2132 CTA independently interpreted by myself; no dissection, pending radiologist read   2146 Pain improved with nitro   2150 hs TnI 2hr(!): 790   2216 Pt reassessed, pain basically gone at this time. I discussed results and need for admission, he is in agreement. Starting heparin and admitting       Medications   heparin (porcine) 25,000 units in 0.45% NaCl 250 mL infusion (premix) (11.1 Units/kg/hr × 90 kg (Order-Specific) Intravenous New Bag 1/14/25 2243)   heparin (porcine) injection 4,000 Units (has no administration in time range)   heparin (porcine) injection 2,000 Units (has no administration in time range)   acetaminophen (TYLENOL) tablet 650 mg (has no administration in time range)   ondansetron (ZOFRAN) injection 4 mg (has no administration in time range)   aluminum-magnesium hydroxide-simethicone (MAALOX) oral suspension 30 mL (has no administration in time range)   iohexol (OMNIPAQUE) 350 MG/ML injection (SINGLE-DOSE) 100 mL (100 mL Intravenous Given 1/14/25 2110)   nitroglycerin (NITROSTAT) SL tablet 0.4 mg (0.4 mg Sublingual Given 1/14/25 2120)   aspirin chewable tablet 324 mg (324 mg Oral Given 1/14/25 2157)   heparin (porcine) injection 4,000 Units (4,000 Units Intravenous Given 1/14/25 2243)       ED Risk Strat Scores   HEART Risk Score      Flowsheet Row Most Recent Value   Heart Score Risk Calculator    History 1 Filed at: 01/14/2025 2002   ECG 1 Filed at: 01/14/2025 2002   Age 1 Filed at: 01/14/2025 2002   Risk Factors 1 Filed at: 01/14/2025 2002   Troponin 2 Filed at: 01/14/2025 2002   HEART Score 6 Filed at: 01/14/2025 2002          HEART Risk Score      Flowsheet Row Most Recent Value   Heart Score Risk Calculator    History 1 Filed at: 01/14/2025 2002   ECG 1 Filed at: 01/14/2025 2002   Age 1 Filed at: 01/14/2025 2002   Risk Factors 1 Filed at: 01/14/2025  2002   Troponin 2 Filed at: 01/14/2025 2002   HEART Score 6 Filed at: 01/14/2025 2002                            SBIRT 20yo+      Flowsheet Row Most Recent Value   Initial Alcohol Screen: US AUDIT-C     1. How often do you have a drink containing alcohol? 0 Filed at: 01/14/2025 1837   2. How many drinks containing alcohol do you have on a typical day you are drinking?  0 Filed at: 01/14/2025 1837   3a. Male UNDER 65: How often do you have five or more drinks on one occasion? 0 Filed at: 01/14/2025 1837   3b. FEMALE Any Age, or MALE 65+: How often do you have 4 or more drinks on one occassion? 0 Filed at: 01/14/2025 1837   Audit-C Score 0 Filed at: 01/14/2025 1837   ALONZO: How many times in the past year have you...    Used an illegal drug or used a prescription medication for non-medical reasons? Never Filed at: 01/14/2025 1837                            History of Present Illness       Chief Complaint   Patient presents with    Chest Pain     Pt arrives via ems, from home. Pt reports having intermittent chest pain for a couple of weeks, describes the pain traveling form the center to the R shoulder and through the back.        Past Medical History:   Diagnosis Date    Asthma     Hyperlipidemia     Hypertension     Sinus congestion       Past Surgical History:   Procedure Laterality Date    CARPAL TUNNEL RELEASE      right hand      No family history on file.   Social History     Tobacco Use    Smoking status: Never    Smokeless tobacco: Never   Vaping Use    Vaping status: Never Used   Substance Use Topics    Alcohol use: Never    Drug use: Never      E-Cigarette/Vaping    E-Cigarette Use Never User       E-Cigarette/Vaping Substances      I have reviewed and agree with the history as documented.     60 yo M h/o HTN, no medications for 2 years; presenting for evaluation of CP..    Pt reports 2 weeks of CP.  R chest with radiation to R upper back. Pleuritic    Has not had insurance of meds for at least 2  years    Denies fevers, chills, cough/URI sx, SOB, lightheadedness, syncope, abdominal pain, N/V/D/C, leg pain/swelling        Review of Systems        Objective       ED Triage Vitals   Temperature Pulse Blood Pressure Respirations SpO2 Patient Position - Orthostatic VS   01/14/25 1833 01/14/25 1833 01/14/25 1833 01/14/25 1833 01/14/25 1833 01/14/25 1833   99.1 °F (37.3 °C) (!) 113 (!) 234/129 20 98 % Lying      Temp Source Heart Rate Source BP Location FiO2 (%) Pain Score    01/14/25 1833 01/14/25 1833 01/14/25 1833 -- 01/14/25 2144    Oral Monitor Left arm  4      Vitals      Date and Time Temp Pulse SpO2 Resp BP Pain Score FACES Pain Rating User   01/14/25 2348 98.9 °F (37.2 °C) 97 97 % 20 197/92 -- -- IN   01/14/25 2306 -- -- -- -- -- No Pain -- MR   01/14/25 2245 -- 102 100 % 20 160/90 -- -- VF   01/14/25 2145 -- 102 100 % 22 149/92 -- -- VF   01/14/25 2144 -- -- -- -- -- 4 -- VF   01/14/25 2130 -- 108 100 % 24 154/106 -- -- VF   01/14/25 2045 -- 100 98 % 22 167/88 -- -- VF   01/14/25 2030 -- 100 97 % 18 167/90 -- -- VF   01/14/25 2000 -- 102 97 % -- 164/85 -- -- BM   01/14/25 1912 -- -- -- -- 185/112 -- -- VF   01/14/25 1910 -- 104 97 % 18 194/111 -- -- VF   01/14/25 1906 -- 112 99 % 17 179/87 -- -- VF   01/14/25 1845 -- 108 99 % 20 205/111 -- -- CF   01/14/25 1833 99.1 °F (37.3 °C) 113 98 % 20 234/129 -- -- CF            Physical Exam  Vitals and nursing note reviewed.   Constitutional:       General: He is not in acute distress.     Appearance: Normal appearance. He is well-developed.   HENT:      Head: Normocephalic and atraumatic.      Nose: Nose normal.   Eyes:      Extraocular Movements: Extraocular movements intact.      Conjunctiva/sclera: Conjunctivae normal.   Cardiovascular:      Rate and Rhythm: Normal rate and regular rhythm.      Heart sounds: Normal heart sounds.   Pulmonary:      Effort: Pulmonary effort is normal. No respiratory distress.      Breath sounds: Normal breath sounds. No stridor.  No wheezing or rales.   Chest:      Chest wall: No tenderness.   Abdominal:      General: There is no distension.      Palpations: Abdomen is soft.      Tenderness: There is no abdominal tenderness. There is no guarding or rebound.   Musculoskeletal:         General: No swelling, tenderness or deformity.      Cervical back: Normal range of motion and neck supple.   Skin:     General: Skin is warm and dry.      Findings: No rash.   Neurological:      General: No focal deficit present.      Mental Status: He is alert and oriented to person, place, and time.      Motor: No abnormal muscle tone.      Coordination: Coordination normal.   Psychiatric:         Thought Content: Thought content normal.         Judgment: Judgment normal.         Results Reviewed       Procedure Component Value Units Date/Time    HS Troponin I 4hr [[id number]]  (Abnormal) Collected: 01/14/25 2318    Lab Status: Final result Specimen: Blood from Arm, Left Updated: 01/14/25 2345     hs TnI 4hr 1,733 ng/L      Delta 4hr hsTnI 1,521 ng/L     Hemoglobin A1C w/ EAG Estimation [[id number]] Collected: 01/14/25 1906    Lab Status: In process Specimen: Blood from Arm, Right Updated: 01/14/25 2316    APTT six (6) hours after Heparin bolus/drip initiation or dosing change [[id number]]  (Normal) Collected: 01/14/25 2238    Lab Status: Final result Specimen: Blood from Arm, Right Updated: 01/14/25 2302     PTT 26 seconds     Protime-INR [[id number]]  (Normal) Collected: 01/14/25 2238    Lab Status: Final result Specimen: Blood from Arm, Right Updated: 01/14/25 2302     Protime 13.6 seconds      INR 1.02    Narrative:      INR Therapeutic Range    Indication                                             INR Range      Atrial Fibrillation                                               2.0-3.0  Hypercoagulable State                                    2.0.2.3  Left Ventricular Asist Device                            2.0-3.0  Mechanical Heart Valve                                   -    Aortic(with afib, MI, embolism, HF, LA enlargement,    and/or coagulopathy)                                     2.0-3.0 (2.5-3.5)     Mitral                                                             2.5-3.5  Prosthetic/Bioprosthetic Heart Valve               2.0-3.0  Venous thromboembolism (VTE: VT, PE        2.0-3.0    HS Troponin I 2hr [[id number]]  (Abnormal) Collected: 01/14/25 2104    Lab Status: Final result Specimen: Blood from Arm, Right Updated: 01/14/25 2147     hs TnI 2hr 790 ng/L      Delta 2hr hsTnI 578 ng/L     HS Troponin 0hr (reflex protocol) [[id number]]  (Abnormal) Collected: 01/14/25 1906    Lab Status: Final result Specimen: Blood from Arm, Right Updated: 01/14/25 1937     hs TnI 0hr 212 ng/L     Comprehensive metabolic panel [[id number]]  (Abnormal) Collected: 01/14/25 1906    Lab Status: Final result Specimen: Blood from Arm, Right Updated: 01/14/25 1934     Sodium 138 mmol/L      Potassium 3.6 mmol/L      Chloride 102 mmol/L      CO2 27 mmol/L      ANION GAP 9 mmol/L      BUN 17 mg/dL      Creatinine 1.11 mg/dL      Glucose 225 mg/dL      Calcium 9.0 mg/dL      AST 25 U/L      ALT 34 U/L      Alkaline Phosphatase 78 U/L      Total Protein 7.5 g/dL      Albumin 4.1 g/dL      Total Bilirubin 0.33 mg/dL      eGFR 71 ml/min/1.73sq m     Narrative:      [employer] guidelines for Chronic Kidney Disease (CKD):     Stage 1 with normal or high GFR (GFR > 90 mL/min/1.73 square meters)    Stage 2 Mild CKD (GFR = 60-89 mL/min/1.73 square meters)    Stage 3A Moderate CKD (GFR = 45-59 mL/min/1.73 square meters)    Stage 3B Moderate CKD (GFR = 30-44 mL/min/1.73 square meters)    Stage 4 Severe CKD (GFR = 15-29 mL/min/1.73 square meters)    Stage 5 End Stage CKD (GFR <15 mL/min/1.73 square meters)  Note: GFR calculation is accurate only with a steady state creatinine    D-dimer, quantitative [[id number]]  (Normal) Collected: 01/14/25 5597    Lab Status: Final result  Specimen: Blood from Arm, Right Updated: 01/14/25 1933     D-Dimer, Quant <0.27 ug/ml FEU     Narrative:      In the evaluation for possible pulmonary embolism, in the appropriate (Well's Score of 4 or less) patient, the age adjusted d-dimer cutoff for this patient can be calculated as:    Age x 0.01 (in ug/mL) for Age-adjusted D-dimer exclusion threshold for a patient over 50 years.    CBC and differential [[id number]] Collected: 01/14/25 1906    Lab Status: Final result Specimen: Blood from Arm, Right Updated: 01/14/25 1913     WBC 9.86 Thousand/uL      RBC 5.15 Million/uL      Hemoglobin 14.3 g/dL      Hematocrit 43.8 %      MCV 85 fL      MCH 27.8 pg      MCHC 32.6 g/dL      RDW 14.8 %      MPV 11.0 fL      Platelets 313 Thousands/uL      nRBC 0 /100 WBCs      Segmented % 63 %      Immature Grans % 0 %      Lymphocytes % 29 %      Monocytes % 7 %      Eosinophils Relative 1 %      Basophils Relative 0 %      Absolute Neutrophils 6.21 Thousands/µL      Absolute Immature Grans 0.03 Thousand/uL      Absolute Lymphocytes 2.82 Thousands/µL      Absolute Monocytes 0.71 Thousand/µL      Eosinophils Absolute 0.06 Thousand/µL      Basophils Absolute 0.03 Thousands/µL             CTA dissection protocol chest/abdomen/pelvis   ED Interpretation by Lesley Velasquez DO (01/14 2204)   IMPRESSION:     1.  No acute aortic injury.  2.  Mild bilateral groundglass opacities, favored to be inflammatory.  3.  Coronary artery calcifications.  4.  Hepatic steatosis.           Final Interpretation by Ciro Izquierdo MD (01/14 2158)      1.  No acute aortic injury.   2.  Mild bilateral groundglass opacities, favored to be inflammatory.   3.  Coronary artery calcifications.   4.  Hepatic steatosis.               Workstation performed: [id number]             Procedures    ED Medication and Procedure Management   Prior to Admission Medications   Prescriptions Last Dose Informant Patient Reported? Taking?   Advair -21 MCG/ACT inhaler Not  Taking  No No   Sig: INHALE 2 PUFFS 2 (TWO) TIMES A DAY RINSE MOUTH AFTER USE.   Patient not taking: Reported on 1/14/2025   Blood Pressure Monitoring ([facility] [facility] BP Monitor/Arm) KELLY Not Taking  No No   Sig: Use daily as needed (dizziness)   Patient not taking: Reported on 1/14/2025   acetaminophen (TYLENOL) 500 mg tablet  Self Yes Yes   Sig: Take 1,000 mg by mouth every 6 (six) hours as needed for mild pain   albuterol (PROVENTIL HFA,VENTOLIN HFA) 90 mcg/act inhaler Not Taking  No No   Sig: INHALE 1 PUFF EVERY 6 (SIX) HOURS AS NEEDED FOR WHEEZING   Patient not taking: Reported on 1/14/2025   amLODIPine (NORVASC) 5 mg tablet   No No   Sig: TAKE 1 TABLET (5 MG TOTAL) BY MOUTH DAILY   atorvastatin (LIPITOR) 40 mg tablet   No No   Sig: TAKE 1 TABLET (40 MG TOTAL) BY MOUTH DAILY   cetirizine (ZyrTEC) 10 mg tablet Not Taking  No No   Sig: Take 1 tablet (10 mg total) by mouth daily   Patient not taking: Reported on 1/14/2025   hydrochlorothiazide (HYDRODIURIL) 25 mg tablet   No No   Sig: TAKE 0.5 TABLETS (12.5 MG TOTAL) BY MOUTH DAILY   lisinopril (ZESTRIL) 40 mg tablet Not Taking  No No   Sig: TAKE 1 TABLET (40 MG TOTAL) BY MOUTH DAILY   Patient not taking: Reported on 1/14/2025   montelukast (SINGULAIR) 10 mg tablet Not Taking  No No   Sig: Take 1 tablet (10 mg total) by mouth daily at bedtime   Patient not taking: Reported on 1/14/2025      Facility-Administered Medications: None     Current Discharge Medication List        CONTINUE these medications which have NOT CHANGED    Details   acetaminophen (TYLENOL) 500 mg tablet Take 1,000 mg by mouth every 6 (six) hours as needed for mild pain      Advair -21 MCG/ACT inhaler INHALE 2 PUFFS 2 (TWO) TIMES A DAY RINSE MOUTH AFTER USE.  Qty: 24 g, Refills: 3    Comments: Substitution to a formulary equivalent within the same pharmaceutical class is authorized.  Associated Diagnoses: Mild intermittent asthma, unspecified whether complicated      albuterol (PROVENTIL  HFA,VENTOLIN HFA) 90 mcg/act inhaler INHALE 1 PUFF EVERY 6 (SIX) HOURS AS NEEDED FOR WHEEZING  Qty: 8.5 g, Refills: 0    Comments: Substitution to a formulary equivalent within the same pharmaceutical class is authorized.  Associated Diagnoses: Mild intermittent asthma, unspecified whether complicated      amLODIPine (NORVASC) 5 mg tablet TAKE 1 TABLET (5 MG TOTAL) BY MOUTH DAILY  Qty: 90 tablet, Refills: 1    Associated Diagnoses: Hypertension, unspecified type      atorvastatin (LIPITOR) 40 mg tablet TAKE 1 TABLET (40 MG TOTAL) BY MOUTH DAILY  Qty: 90 tablet, Refills: 1    Associated Diagnoses: Hyperlipidemia, unspecified hyperlipidemia type      Blood Pressure Monitoring ([place] [facility] BP Monitor/Arm) KELLY Use daily as needed (dizziness)  Qty: 1 each, Refills: 0    Associated Diagnoses: Hypertension, unspecified type      cetirizine (ZyrTEC) 10 mg tablet Take 1 tablet (10 mg total) by mouth daily  Qty: 30 tablet, Refills: 1    Associated Diagnoses: Sinus pressure      hydrochlorothiazide (HYDRODIURIL) 25 mg tablet TAKE 0.5 TABLETS (12.5 MG TOTAL) BY MOUTH DAILY  Qty: 45 tablet, Refills: 1    Associated Diagnoses: Peripheral edema      lisinopril (ZESTRIL) 40 mg tablet TAKE 1 TABLET (40 MG TOTAL) BY MOUTH DAILY  Qty: 90 tablet, Refills: 2    Associated Diagnoses: Hypertension, unspecified type      montelukast (SINGULAIR) 10 mg tablet Take 1 tablet (10 mg total) by mouth daily at bedtime  Qty: 90 tablet, Refills: 3    Associated Diagnoses: Mild intermittent asthma, unspecified whether complicated           No discharge procedures on file.  ED SEPSIS DOCUMENTATION   Time reflects when diagnosis was documented in both MDM as applicable and the Disposition within this note       Time User Action Codes Description Comment    1/14/2025 10:16 PM Lesley Velasquez Add [R79.89] Elevated troponin     1/14/2025 10:16 PM Lesley Velasquez Add [R07.9] Chest pain     1/14/2025 10:16 PM Lesley Velasquez Add [R03.0] Elevated blood pressure  reading                  Lesley Velasquez, DO  01/14/25 2100

## 2025-01-15 ENCOUNTER — APPOINTMENT (INPATIENT)
Dept: NON INVASIVE DIAGNOSTICS | Facility: HOSPITAL | Age: 60
DRG: 175 | End: 2025-01-15
Payer: COMMERCIAL

## 2025-01-15 PROBLEM — E11.9 TYPE 2 DIABETES MELLITUS WITHOUT COMPLICATION, WITHOUT LONG-TERM CURRENT USE OF INSULIN (HCC): Status: ACTIVE | Noted: 2025-01-15

## 2025-01-15 PROBLEM — I16.1 HYPERTENSIVE EMERGENCY: Status: ACTIVE | Noted: 2025-01-15

## 2025-01-15 PROBLEM — I21.4 NSTEMI (NON-ST ELEVATED MYOCARDIAL INFARCTION) (HCC): Status: ACTIVE | Noted: 2025-01-15

## 2025-01-15 LAB
ALBUMIN SERPL BCG-MCNC: 3.6 G/DL (ref 3.5–5)
ALP SERPL-CCNC: 66 U/L (ref 34–104)
ALT SERPL W P-5'-P-CCNC: 29 U/L (ref 7–52)
ANION GAP SERPL CALCULATED.3IONS-SCNC: 9 MMOL/L (ref 4–13)
AORTIC ROOT: 3.6 CM
APTT PPP: 42 SECONDS (ref 23–34)
APTT PPP: 59 SECONDS (ref 23–34)
APTT PPP: 74 SECONDS (ref 23–34)
AST SERPL W P-5'-P-CCNC: 25 U/L (ref 13–39)
ATRIAL RATE: 102 BPM
ATRIAL RATE: 107 BPM
ATRIAL RATE: 112 BPM
ATRIAL RATE: 90 BPM
ATRIAL RATE: 97 BPM
ATRIAL RATE: 99 BPM
BILIRUB SERPL-MCNC: 0.55 MG/DL (ref 0.2–1)
BNP SERPL-MCNC: 102 PG/ML (ref 0–100)
BSA FOR ECHO PROCEDURE: 2.01 M2
BUN SERPL-MCNC: 14 MG/DL (ref 5–25)
CALCIUM SERPL-MCNC: 8.5 MG/DL (ref 8.4–10.2)
CARDIAC TROPONIN I PNL SERPL HS: 1374 NG/L (ref 8–18)
CARDIAC TROPONIN I PNL SERPL HS: 1734 NG/L (ref 8–18)
CARDIAC TROPONIN I PNL SERPL HS: 2077 NG/L (ref 8–18)
CHLORIDE SERPL-SCNC: 107 MMOL/L (ref 96–108)
CHOLEST SERPL-MCNC: 221 MG/DL (ref ?–200)
CO2 SERPL-SCNC: 24 MMOL/L (ref 21–32)
CREAT SERPL-MCNC: 0.88 MG/DL (ref 0.6–1.3)
DOP CALC LVOT AREA: 3.14 CM2
DOP CALC LVOT DIAMETER: 2 CM
E WAVE DECELERATION TIME: 163 MS
E/A RATIO: 0.63
ERYTHROCYTE [DISTWIDTH] IN BLOOD BY AUTOMATED COUNT: 14.8 % (ref 11.6–15.1)
EST. AVERAGE GLUCOSE BLD GHB EST-MCNC: 146 MG/DL
FRACTIONAL SHORTENING: 40 (ref 28–44)
GFR SERPL CREATININE-BSD FRML MDRD: 92 ML/MIN/1.73SQ M
GLUCOSE P FAST SERPL-MCNC: 137 MG/DL (ref 65–99)
GLUCOSE SERPL-MCNC: 124 MG/DL (ref 65–140)
GLUCOSE SERPL-MCNC: 134 MG/DL (ref 65–140)
GLUCOSE SERPL-MCNC: 136 MG/DL (ref 65–140)
GLUCOSE SERPL-MCNC: 137 MG/DL (ref 65–140)
GLUCOSE SERPL-MCNC: 160 MG/DL (ref 65–140)
HBA1C MFR BLD: 6.7 %
HCT VFR BLD AUTO: 39.9 % (ref 36.5–49.3)
HDLC SERPL-MCNC: 43 MG/DL
HGB BLD-MCNC: 13.1 G/DL (ref 12–17)
INR PPP: 1.1 (ref 0.85–1.19)
INTERVENTRICULAR SEPTUM IN DIASTOLE (PARASTERNAL SHORT AXIS VIEW): 1.7 CM
INTERVENTRICULAR SEPTUM: 1.7 CM (ref 0.6–1.1)
LDLC SERPL CALC-MCNC: 152 MG/DL (ref 0–100)
LEFT ATRIUM AREA SYSTOLE SINGLE PLANE A4C: 11.3 CM2
LEFT ATRIUM SIZE: 3.3 CM
LEFT INTERNAL DIMENSION IN SYSTOLE: 1.8 CM (ref 2.1–4)
LEFT VENTRICULAR INTERNAL DIMENSION IN DIASTOLE: 3 CM (ref 3.5–6)
LEFT VENTRICULAR POSTERIOR WALL IN END DIASTOLE: 1.7 CM
LEFT VENTRICULAR STROKE VOLUME: 25 ML
LV EF US.2D.A4C+ESTIMATED: 68 %
LVSV (TEICH): 25 ML
MAGNESIUM SERPL-MCNC: 1.9 MG/DL (ref 1.9–2.7)
MCH RBC QN AUTO: 28.1 PG (ref 26.8–34.3)
MCHC RBC AUTO-ENTMCNC: 32.8 G/DL (ref 31.4–37.4)
MCV RBC AUTO: 85 FL (ref 82–98)
MV E'TISSUE VEL-SEP: 7 CM/S
MV PEAK A VEL: 0.78 M/S
MV PEAK E VEL: 49 CM/S
MV STENOSIS PRESSURE HALF TIME: 47 MS
MV VALVE AREA P 1/2 METHOD: 4.68
P AXIS: 112 DEGREES
P AXIS: 42 DEGREES
P AXIS: 45 DEGREES
P AXIS: 52 DEGREES
P AXIS: 54 DEGREES
P AXIS: 57 DEGREES
PHOSPHATE SERPL-MCNC: 2.6 MG/DL (ref 2.3–4.1)
PLATELET # BLD AUTO: 289 THOUSANDS/UL (ref 149–390)
PMV BLD AUTO: 10.9 FL (ref 8.9–12.7)
POTASSIUM SERPL-SCNC: 3.6 MMOL/L (ref 3.5–5.3)
PR INTERVAL: 132 MS
PR INTERVAL: 136 MS
PR INTERVAL: 146 MS
PR INTERVAL: 148 MS
PR INTERVAL: 156 MS
PR INTERVAL: 160 MS
PROT SERPL-MCNC: 6.6 G/DL (ref 6.4–8.4)
PROTHROMBIN TIME: 14.4 SECONDS (ref 12.3–15)
QRS AXIS: 12 DEGREES
QRS AXIS: 12 DEGREES
QRS AXIS: 14 DEGREES
QRS AXIS: 18 DEGREES
QRS AXIS: 19 DEGREES
QRS AXIS: 23 DEGREES
QRSD INTERVAL: 70 MS
QRSD INTERVAL: 72 MS
QRSD INTERVAL: 72 MS
QRSD INTERVAL: 76 MS
QT INTERVAL: 312 MS
QT INTERVAL: 336 MS
QT INTERVAL: 368 MS
QT INTERVAL: 370 MS
QT INTERVAL: 372 MS
QT INTERVAL: 406 MS
QTC INTERVAL: 425 MS
QTC INTERVAL: 437 MS
QTC INTERVAL: 473 MS
QTC INTERVAL: 475 MS
QTC INTERVAL: 491 MS
QTC INTERVAL: 497 MS
RBC # BLD AUTO: 4.67 MILLION/UL (ref 3.88–5.62)
RIGHT ATRIUM AREA SYSTOLE A4C: 8.6 CM2
SL CV PED ECHO LEFT VENTRICLE DIASTOLIC VOLUME (MOD BIPLANE) 2D: 36 ML
SL CV PED ECHO LEFT VENTRICLE SYSTOLIC VOLUME (MOD BIPLANE) 2D: 10 ML
SODIUM SERPL-SCNC: 140 MMOL/L (ref 135–147)
T WAVE AXIS: 112 DEGREES
T WAVE AXIS: 128 DEGREES
T WAVE AXIS: 138 DEGREES
T WAVE AXIS: 205 DEGREES
T WAVE AXIS: 74 DEGREES
T WAVE AXIS: 91 DEGREES
TR MAX PG: 8 MMHG
TR PEAK VELOCITY: 1.4 M/S
TRICUSPID ANNULAR PLANE SYSTOLIC EXCURSION: 1.9 CM
TRICUSPID VALVE PEAK REGURGITATION VELOCITY: 1.43 M/S
TRIGL SERPL-MCNC: 130 MG/DL (ref ?–150)
VENTRICULAR RATE: 102 BPM
VENTRICULAR RATE: 107 BPM
VENTRICULAR RATE: 112 BPM
VENTRICULAR RATE: 90 BPM
VENTRICULAR RATE: 97 BPM
VENTRICULAR RATE: 99 BPM
WBC # BLD AUTO: 9.02 THOUSAND/UL (ref 4.31–10.16)

## 2025-01-15 PROCEDURE — 84100 ASSAY OF PHOSPHORUS: CPT

## 2025-01-15 PROCEDURE — 85730 THROMBOPLASTIN TIME PARTIAL: CPT | Performed by: INTERNAL MEDICINE

## 2025-01-15 PROCEDURE — 93010 ELECTROCARDIOGRAM REPORT: CPT | Performed by: STUDENT IN AN ORGANIZED HEALTH CARE EDUCATION/TRAINING PROGRAM

## 2025-01-15 PROCEDURE — 83880 ASSAY OF NATRIURETIC PEPTIDE: CPT

## 2025-01-15 PROCEDURE — 85027 COMPLETE CBC AUTOMATED: CPT

## 2025-01-15 PROCEDURE — 93005 ELECTROCARDIOGRAM TRACING: CPT

## 2025-01-15 PROCEDURE — 83735 ASSAY OF MAGNESIUM: CPT

## 2025-01-15 PROCEDURE — 99255 IP/OBS CONSLTJ NEW/EST HI 80: CPT | Performed by: INTERNAL MEDICINE

## 2025-01-15 PROCEDURE — 85610 PROTHROMBIN TIME: CPT

## 2025-01-15 PROCEDURE — 80053 COMPREHEN METABOLIC PANEL: CPT

## 2025-01-15 PROCEDURE — 84484 ASSAY OF TROPONIN QUANT: CPT | Performed by: INTERNAL MEDICINE

## 2025-01-15 PROCEDURE — 93306 TTE W/DOPPLER COMPLETE: CPT | Performed by: INTERNAL MEDICINE

## 2025-01-15 PROCEDURE — 82948 REAGENT STRIP/BLOOD GLUCOSE: CPT

## 2025-01-15 PROCEDURE — 93010 ELECTROCARDIOGRAM REPORT: CPT

## 2025-01-15 PROCEDURE — 93306 TTE W/DOPPLER COMPLETE: CPT

## 2025-01-15 PROCEDURE — 84484 ASSAY OF TROPONIN QUANT: CPT | Performed by: NURSE PRACTITIONER

## 2025-01-15 PROCEDURE — 85730 THROMBOPLASTIN TIME PARTIAL: CPT

## 2025-01-15 PROCEDURE — 80061 LIPID PANEL: CPT

## 2025-01-15 PROCEDURE — 99232 SBSQ HOSP IP/OBS MODERATE 35: CPT | Performed by: INTERNAL MEDICINE

## 2025-01-15 PROCEDURE — 84484 ASSAY OF TROPONIN QUANT: CPT | Performed by: STUDENT IN AN ORGANIZED HEALTH CARE EDUCATION/TRAINING PROGRAM

## 2025-01-15 RX ORDER — METOPROLOL TARTRATE 25 MG/1
25 TABLET, FILM COATED ORAL EVERY 12 HOURS SCHEDULED
Status: DISCONTINUED | OUTPATIENT
Start: 2025-01-15 | End: 2025-01-16

## 2025-01-15 RX ORDER — HYDRALAZINE HYDROCHLORIDE 20 MG/ML
5 INJECTION INTRAMUSCULAR; INTRAVENOUS EVERY 6 HOURS PRN
Status: DISCONTINUED | OUTPATIENT
Start: 2025-01-15 | End: 2025-01-17 | Stop reason: HOSPADM

## 2025-01-15 RX ORDER — INSULIN LISPRO 100 [IU]/ML
1-6 INJECTION, SOLUTION INTRAVENOUS; SUBCUTANEOUS
Status: DISCONTINUED | OUTPATIENT
Start: 2025-01-15 | End: 2025-01-17 | Stop reason: HOSPADM

## 2025-01-15 RX ORDER — NITROGLYCERIN 0.4 MG/1
0.4 TABLET SUBLINGUAL
Status: DISCONTINUED | OUTPATIENT
Start: 2025-01-15 | End: 2025-01-17 | Stop reason: HOSPADM

## 2025-01-15 RX ORDER — AMLODIPINE BESYLATE 5 MG/1
5 TABLET ORAL DAILY
Status: DISCONTINUED | OUTPATIENT
Start: 2025-01-15 | End: 2025-01-15

## 2025-01-15 RX ORDER — FLUTICASONE PROPIONATE 50 MCG
2 SPRAY, SUSPENSION (ML) NASAL DAILY PRN
Status: DISCONTINUED | OUTPATIENT
Start: 2025-01-15 | End: 2025-01-17 | Stop reason: HOSPADM

## 2025-01-15 RX ORDER — ASPIRIN 81 MG/1
81 TABLET ORAL DAILY
Status: DISCONTINUED | OUTPATIENT
Start: 2025-01-16 | End: 2025-01-17 | Stop reason: HOSPADM

## 2025-01-15 RX ORDER — ATORVASTATIN CALCIUM 80 MG/1
80 TABLET, FILM COATED ORAL
Status: DISCONTINUED | OUTPATIENT
Start: 2025-01-15 | End: 2025-01-17 | Stop reason: HOSPADM

## 2025-01-15 RX ORDER — AMLODIPINE BESYLATE 10 MG/1
10 TABLET ORAL DAILY
Status: DISCONTINUED | OUTPATIENT
Start: 2025-01-16 | End: 2025-01-17 | Stop reason: HOSPADM

## 2025-01-15 RX ORDER — DIPHENHYDRAMINE HCL 25 MG
50 TABLET ORAL
Status: DISCONTINUED | OUTPATIENT
Start: 2025-01-15 | End: 2025-01-17 | Stop reason: HOSPADM

## 2025-01-15 RX ORDER — LOSARTAN POTASSIUM 25 MG/1
25 TABLET ORAL DAILY
Status: DISCONTINUED | OUTPATIENT
Start: 2025-01-15 | End: 2025-01-16

## 2025-01-15 RX ADMIN — ACETAMINOPHEN 650 MG: 325 TABLET, FILM COATED ORAL at 05:19

## 2025-01-15 RX ADMIN — HEPARIN SODIUM 15.1 UNITS/KG/HR: 10000 INJECTION, SOLUTION INTRAVENOUS at 13:39

## 2025-01-15 RX ADMIN — INSULIN LISPRO 1 UNITS: 100 INJECTION, SOLUTION INTRAVENOUS; SUBCUTANEOUS at 16:59

## 2025-01-15 RX ADMIN — HEPARIN SODIUM 4000 UNITS: 1000 INJECTION, SOLUTION INTRAVENOUS; SUBCUTANEOUS at 05:16

## 2025-01-15 RX ADMIN — LOSARTAN POTASSIUM 25 MG: 25 TABLET, FILM COATED ORAL at 12:35

## 2025-01-15 RX ADMIN — NITROGLYCERIN 0.4 MG: 0.4 TABLET SUBLINGUAL at 20:23

## 2025-01-15 RX ADMIN — HEPARIN SODIUM 2000 UNITS: 1000 INJECTION, SOLUTION INTRAVENOUS; SUBCUTANEOUS at 18:22

## 2025-01-15 RX ADMIN — AMLODIPINE BESYLATE 5 MG: 5 TABLET ORAL at 08:41

## 2025-01-15 RX ADMIN — NITROGLYCERIN 0.4 MG: 0.4 TABLET SUBLINGUAL at 03:32

## 2025-01-15 RX ADMIN — NITROGLYCERIN 0.4 MG: 0.4 TABLET SUBLINGUAL at 08:42

## 2025-01-15 RX ADMIN — NITROGLYCERIN 0.4 MG: 0.4 TABLET SUBLINGUAL at 03:25

## 2025-01-15 RX ADMIN — ACETAMINOPHEN 650 MG: 325 TABLET, FILM COATED ORAL at 12:35

## 2025-01-15 RX ADMIN — ATORVASTATIN CALCIUM 80 MG: 80 TABLET, FILM COATED ORAL at 16:58

## 2025-01-15 RX ADMIN — HYDRALAZINE HYDROCHLORIDE 5 MG: 20 INJECTION, SOLUTION INTRAMUSCULAR; INTRAVENOUS at 03:23

## 2025-01-15 RX ADMIN — NITROGLYCERIN 1 INCH: 20 OINTMENT TOPICAL at 15:06

## 2025-01-15 RX ADMIN — METOPROLOL TARTRATE 25 MG: 25 TABLET, FILM COATED ORAL at 15:06

## 2025-01-15 RX ADMIN — NITROGLYCERIN 0.4 MG: 0.4 TABLET SUBLINGUAL at 12:18

## 2025-01-15 RX ADMIN — NITROGLYCERIN 0.4 MG: 0.4 TABLET SUBLINGUAL at 20:28

## 2025-01-15 NOTE — ASSESSMENT & PLAN NOTE
-Not compliant with medication   - Previously on Amlodipine 5mg, hydrochlorothiazide 12.5mg, Lisinopril 40mg QD

## 2025-01-15 NOTE — ASSESSMENT & PLAN NOTE
"Sliding scale insulin  [facility] glucose checks  N.p.o. for now, patient may need to go to the Cath Lab  Hypo-glycemia protocol    Lab Results   Component Value Date    HGBA1C 6.7 (H) 01/14/2025       No results for input(s): \"POCGLU\" in the last 72 hours.    Blood Sugar Average: Last 72 hrs:      "

## 2025-01-15 NOTE — ASSESSMENT & PLAN NOTE
Patient hypertensive in the emergency room, now stable with systolics in the 150s  No longer taking antihypertensives at home  Restarted on amlodipine 5 mg daily  Routine vital signs

## 2025-01-15 NOTE — CONSULTS
Consultation - General Cardiology Team 2  Segun Grissom 59 y.o. male MRN: [id number]  Unit/Bed#: E4 -01 Encounter: [id number]          Inpatient consult to Cardiology     Date/Time  1/15/2025 12:32 PM     Performed by  Vivek Lema MD   Authorized by  MAREN Kaba           PCP: Rena Maurice MD   Outpatient Cardiologist: N/A     History of Present Illness   Physician Requesting Consult: Galen Daley MD  Reason for Consult / Principal Problem: NSTEMI/Hypertensive emergency      Assessment & Plan       Plan:  TTE  Keep the patient NPO for possible LHC   Continue with Heparin GGT ACS protocol   Continue with BP management- transition to oral medications( does not have health insurance).   Social service/case-management consult to discuss low cost medications       Assessment:  Assessment & Plan  NSTEMI- likely type II  -Presented with right-sided chest pain and elevated troponin levels.  Initial /129.  Days prior to presentation he was nausea and dizziness   -Endorses noncompliance with all his medications for over 2 years, mainly due to lack of health insurance.  -Risk factors for CAD include uncontrolled hypertension, diabetes and hyperlipidemia  -Rise in troponin likely due to hypertensive emergency.  -Coronary artery calcification in the mid to distal LAD and left circumflex noted on chest CT.  Hypertensive emergency  -Endorses noncompliance with medication for over 2 years  -Days prior to presentation he had nausea and dizziness, in addition to right-sided chest pain with radiation to his back   -CTA (1/14/2025): No acute aortic injury, mild bilateral groundglass opacities.  Coronary artery calcification with hepatic asteatosis.  Dyslipidemia  Noncompliant with medications  -Cholesterol 221, LDL calculated 152, HDL 43  Hypertension  -Not compliant with medication   - Previously on Amlodipine 5mg, hydrochlorothiazide 12.5mg, Lisinopril 40mg QD  Asthma  Known  history  Preciously on Albuterol inhaler, Montelukast 10mg, cetrizine 10mg and Advair 230-21 inhaler   Not compliant     Body mass index (BMI) 32.0-32.9, adult    Type 2 diabetes mellitus without complication, without long-term current use of insulin ([facility])  Lab Results   Component Value Date    HGBA1C 6.7 (H) 01/14/2025       Recent Labs     01/15/25  0826 01/15/25  1132   POCGLU 136 134       Blood Sugar Average: Last 72 hrs:  (P) 135              Case discussed and reviewed with Dr. Mcguire who agrees with my assessment and plan.Thank you for involving us in the care of your patient.      Vivek Lema MD  Cardiology Fellow   PGY-6    HPI: Segun Grissom is a 59 y.o. year old male with history of medication noncompliance, hypertension, asthma, and hyperlipidemia who presented with right-sided chest pain that started a day prior to presentation.  As per patient, he had an intermittent right-sided stabbing chest pain with radiation to his back that started a day prior to presentation.  It felt like the episodes were becoming more excruciating and frequent, therefore he decided to come to the hospital.  He endorsed dizziness and nausea couple days prior to admission.  He does not have health insurance and has not taken any of his medications in over 2 years.  On chart review, it appears that he has had an echo and a stress test ordered which were never done.  In ED-initial blood pressure 234/129, heart rate of 113.  Initial ECG suggestive of sinus tachycardia with LVH criteria, and repolarization abnormality.  Troponin of 212, that increased to 792 hours and eventually up to 1733 at 4 hours.  He reports that the right-sided chest pain has mostly resolved after his blood pressure was treated.  CTA ruled out aortic dissection and PE.    Review of Systems  Review of system was conducted and was negative except for as stated in the HPI.      Historical Information   Past Medical History:   Diagnosis Date     "Asthma     Hyperlipidemia     Hypertension     Sinus congestion      Past Surgical History:   Procedure Laterality Date    CARPAL TUNNEL RELEASE      right hand     Social History     Substance and Sexual Activity   Alcohol Use Never     Social History     Substance and Sexual Activity   Drug Use Never     Social History     Tobacco Use   Smoking Status Never   Smokeless Tobacco Never     Family History: Family history non-contributory    Meds/Allergies   Hospital Medications:   Current Facility-Administered Medications:     acetaminophen (TYLENOL) tablet 650 mg, Q6H PRN    aluminum-magnesium hydroxide-simethicone (MAALOX) oral suspension 30 mL, Q6H PRN    amLODIPine (NORVASC) tablet 5 mg, Daily    heparin (porcine) 25,000 units in 0.45% NaCl 250 mL infusion (premix), Titrated, Last Rate: 15.1 Units/kg/hr (01/15/25 0517)    heparin (porcine) injection 2,000 Units, Q6H PRN    heparin (porcine) injection 4,000 Units, Q6H PRN    hydrALAZINE (APRESOLINE) injection 5 mg, Q6H PRN    insulin lispro (HumALOG/ADMELOG) 100 units/mL subcutaneous injection 1-6 Units, 4x Daily (AC & HS)    nitroglycerin (NITROSTAT) SL tablet 0.4 mg, Q5 Min PRN    ondansetron (ZOFRAN) injection 4 mg, Q6H PRN  Home Medications:   Medications Prior to Admission:     acetaminophen (TYLENOL) 500 mg tablet    Advair -21 MCG/ACT inhaler    albuterol (PROVENTIL HFA,VENTOLIN HFA) 90 mcg/act inhaler    amLODIPine (NORVASC) 5 mg tablet    atorvastatin (LIPITOR) 40 mg tablet    Blood Pressure Monitoring ([facility] [employer] BP Monitor/Arm) KELLY    cetirizine (ZyrTEC) 10 mg tablet    hydrochlorothiazide (HYDRODIURIL) 25 mg tablet    lisinopril (ZESTRIL) 40 mg tablet    montelukast (SINGULAIR) 10 mg tablet    No Known Allergies    Objective   Vitals: Blood pressure 157/95, pulse 91, temperature 99 °F (37.2 °C), temperature source Temporal, resp. rate 18, height 5' 6\" (1.676 m), weight 91.8 kg (202 lb 6.1 oz), SpO2 97%.  Orthostatic Blood Pressures  "     Flowsheet Row Most Recent Value   Blood Pressure 157/95 filed at 01/15/2025 1130   Patient Position - Orthostatic VS Lying filed at 01/15/2025 1130              Invasive Devices       Peripheral Intravenous Line  Duration             Peripheral IV 01/14/25 Right Antecubital <1 day                    Physical Exam    GEN: Segun Grissom appears well, alert and oriented x 3, pleasant and cooperative   NECK: No JVD or carotid bruits   HEART: Regular rhythm, normal rate, normal S1 and S2, no murmurs, clicks, gallops or rubs   LUNGS: Faint crackles heard at the bases.   ABDOMEN:  Normoactive bowel sounds, soft, no tenderness, no distention  EXTREMITIES: peripheral pulses palpable; no edema    Lab Results: I have personally reviewed pertinent lab results.    Results from last 7 days   Lab Units 01/14/25  2318 01/14/25  2104 01/14/25  1906   HS TNI 0HR ng/L  --   --  212*   HS TNI 2HR ng/L  --  790*  --    HS TNI 4HR ng/L 1,733*  --   --          Results from last 7 days   Lab Units 01/15/25  0447 01/14/25  1906   POTASSIUM mmol/L 3.6 3.6   CO2 mmol/L 24 27   CHLORIDE mmol/L 107 102   BUN mg/dL 14 17   CREATININE mg/dL 0.88 1.11     Results from last 7 days   Lab Units 01/15/25  0447 01/14/25  1906   HEMOGLOBIN g/dL 13.1 14.3   HEMATOCRIT % 39.9 43.8   PLATELETS Thousands/uL 289 313     Results from last 7 days   Lab Units 01/15/25  0447 01/14/25  1906   TRIGLYCERIDES mg/dL 130  --    HDL mg/dL 43  --    LDL CALC mg/dL 152*  --    HEMOGLOBIN A1C %  --  6.7*         Imaging: Results Review Statement: No pertinent imaging studies reviewed.        Previous STRESS TEST:  No results found for this or any previous visit.     No results found for this or any previous visit.    No results found for this or any previous visit.      Previous Cath/PCI:  No results found for this or any previous visit.    No results found for this or any previous visit.    No results found for this or any previous visit.      ECHO:  No  results found for this or any previous visit.    No results found for this or any previous visit.      JAILENE:  No results found for this or any previous visit.    No results found for this or any previous visit.      CMR:  No results found for this or any previous visit.    No results found for this or any previous visit.    No results found for this or any previous visit.      HOLTER  No results found for this or any previous visit.    No results found for this or any previous visit.

## 2025-01-15 NOTE — ASSESSMENT & PLAN NOTE
-Presented with right-sided chest pain and elevated troponin levels.  Initial /129.  Days prior to presentation he was nausea and dizziness   -Endorses noncompliance with all his medications for over 2 years, mainly due to lack of health insurance.  -Risk factors for CAD include uncontrolled hypertension, diabetes and hyperlipidemia  -Rise in troponin likely due to hypertensive emergency.  -Coronary artery calcification in the mid to distal LAD and left circumflex noted on chest CT.

## 2025-01-15 NOTE — ASSESSMENT & PLAN NOTE
Patient's BMI is 32.67  Benefit greatly from a very low carbohydrate diet (less than 50 g daily) and intermittent fasting  Triglyceride to HDL ratio about 3, A1c 6.7.  Patient has significant metabolic dysfunction

## 2025-01-15 NOTE — PROGRESS NOTES
Progress Note - Hospitalist   Name: Segun Grissom 59 y.o. male I MRN: [id number]  Unit/Bed#: E4 -01 I Date of Admission: 1/14/2025   Date of Service: 1/15/2025 I Hospital Day: 0    Assessment & Plan  NSTEMI- likely type II  Patient is 59-year-old male with past medical history of hypertension, hyperlipidemia, and asthma who presents to the hospital with chest pain intermittent for the last few days.  He has a family history of CAD.  In the emergency department his initial troponin was elevated, he was given nitro and started on heparin infusion will be admitted for further monitoring.  Cardiology consulted and appreciate recommendations  Continue heparin infusion  Troponins 212-->790-->1733-->2077  Check BNP-102  Monitor on telemetry  EKG appears nonischemic, however does show LVH  Check echocardiogram  1/15-update: As per cardiology, likely type II MI; better blood pressure control needed; continue ACS protocol for now; n.p.o. at midnight in case patient would require heart cath moving forward.  Dyslipidemia  Patient no longer takes statin  Lipid panel 221/130/43/152  Reinitiate statin therapy    Hypertension  Patient hypertensive in the emergency room, now stable with systolics in the 150s  No longer taking antihypertensives at home  Restarted on amlodipine 5 mg daily and losartan 25 mg daily  Cardiology added metoprolol 25 mg twice daily; will hold losartan tomorrow in anticipation of heart cath  Routine vital signs  Asthma  No acute exacerbation  Patient no longer uses an inhaler  Body mass index (BMI) 32.0-32.9, adult  Patient's BMI is 32.67  Benefit greatly from a very low carbohydrate diet (less than 50 g daily) and intermittent fasting  Triglyceride to HDL ratio about 3, A1c 6.7.  Patient has significant metabolic dysfunction  Type 2 diabetes mellitus without complication, without long-term current use of insulin ([facility])  Sliding scale insulin  ACHS glucose checks  Consistent carb  diet  Hypo-glycemia protocol    Lab Results   Component Value Date    HGBA1C 6.7 (H) 01/14/2025       Recent Labs     01/15/25  0826 01/15/25  1132 01/15/25  1553   POCGLU 136 134 160*       Blood Sugar Average: Last 72 hrs:  (P) 143.7745426632697803    Hypertensive emergency      VTE Pharmacologic Prophylaxis: VTE Score: 4 Moderate Risk (Score 3-4) - Pharmacological DVT Prophylaxis Ordered: heparin drip.    Mobility:   Basic Mobility Inpatient Raw Score: 24  JH-HLM Goal: 8: Walk 250 feet or more  JH-HLM Achieved: 8: Walk 250 feet ot more  JH-HLM Goal achieved. Continue to encourage appropriate mobility.    Patient Centered Rounds: I performed bedside rounds with nursing staff today.   Discussions with Specialists or Other Care Team Provider: Cardiology    Education and Discussions with Family / Patient: Care plan discussed with patient who voiced understanding and agrees with recommendations.      Current Length of Stay: 0 day(s)  Current Patient Status: Inpatient   Certification Statement: The patient will continue to require additional inpatient hospital stay due to treatment of possible ACS  Discharge Plan: Anticipate discharge in 24-48 hrs to home.    Code Status: Level 1 - Full Code    Subjective   Patient seen and examined at bedside, reports no further chest pain and is generally comfortable.  Have started Lipitor and reinitiated home medications for blood pressure to include losartan and Norvasc.  Hold Norvasc tomorrow morning in anticipation of possible heart catheterization.  Cardiology started metoprolol as well for better blood pressure control.  N.p.o. at midnight, continue ACS protocol with heparin drip and daily aspirin.    Objective :  Temp:  [97.5 °F (36.4 °C)-99.4 °F (37.4 °C)] 97.5 °F (36.4 °C)  HR:  [] 93  BP: (149-234)/() 158/98  Resp:  [17-24] 18  SpO2:  [96 %-100 %] 98 %  O2 Device: None (Room air)    Body mass index is 32.67 kg/m².     Input and Output Summary (last 24 hours):      Intake/Output Summary (Last 24 hours) at 1/15/2025 1717  Last data filed at 1/14/2025 2332  Gross per 24 hour   Intake 240 ml   Output --   Net 240 ml       Physical Exam  Vitals and nursing note reviewed.   Constitutional:       General: He is not in acute distress.     Appearance: He is well-developed.   HENT:      Head: Normocephalic and atraumatic.   Eyes:      Conjunctiva/sclera: Conjunctivae normal.   Cardiovascular:      Rate and Rhythm: Normal rate.      Heart sounds: No murmur heard.  Pulmonary:      Effort: Pulmonary effort is normal. No respiratory distress.   Abdominal:      Palpations: Abdomen is soft.      Tenderness: There is no abdominal tenderness.   Musculoskeletal:         General: No swelling.      Cervical back: Neck supple.   Skin:     General: Skin is warm and dry.   Neurological:      Mental Status: He is alert and oriented to person, place, and time.   Psychiatric:         Mood and Affect: Mood normal.           Lines/Drains:        Telemetry:  Telemetry Orders (From admission, onward)               24 Hour Telemetry Monitoring  Continuous x 24 Hours (Telem)        Expiring   Question:  Reason for 24 Hour Telemetry  Answer:  PCI/EP study (including pacer and ICD implementation), Cardiac surgery, MI, abnormal cardiac cath, and chest pain- rule out MI                     Telemetry Reviewed: Normal Sinus Rhythm  Indication for Continued Telemetry Use: Acute MI/Unstable Angina/Rule out ACS               Lab Results: I have reviewed the following results:   Results from last 7 days   Lab Units 01/15/25  0447 01/14/25  1906   WBC Thousand/uL 9.02 9.86   HEMOGLOBIN g/dL 13.1 14.3   HEMATOCRIT % 39.9 43.8   PLATELETS Thousands/uL 289 313   SEGS PCT %  --  63   LYMPHO PCT %  --  29   MONO PCT %  --  7   EOS PCT %  --  1     Results from last 7 days   Lab Units 01/15/25  0447   SODIUM mmol/L 140   POTASSIUM mmol/L 3.6   CHLORIDE mmol/L 107   CO2 mmol/L 24   BUN mg/dL 14   CREATININE mg/dL 0.88    ANION GAP mmol/L 9   CALCIUM mg/dL 8.5   ALBUMIN g/dL 3.6   TOTAL BILIRUBIN mg/dL 0.55   ALK PHOS U/L 66   ALT U/L 29   AST U/L 25   GLUCOSE RANDOM mg/dL 137     Results from last 7 days   Lab Units 01/15/25  0447   INR  1.10     Results from last 7 days   Lab Units 01/15/25  1553 01/15/25  1132 01/15/25  0826   POC GLUCOSE mg/dl 160* 134 136     Results from last 7 days   Lab Units 01/14/25  1906   HEMOGLOBIN A1C % 6.7*           Recent Cultures (last 7 days):         Imaging Results Review: I reviewed radiology reports from this admission including: CT chest.      Last 24 Hours Medication List:     Current Facility-Administered Medications:     acetaminophen (TYLENOL) tablet 650 mg, Q6H PRN    aluminum-magnesium hydroxide-simethicone (MAALOX) oral suspension 30 mL, Q6H PRN    amLODIPine (NORVASC) tablet 5 mg, Daily    [START ON 1/16/2025] aspirin (ECOTRIN LOW STRENGTH) EC tablet 81 mg, Daily    atorvastatin (LIPITOR) tablet 80 mg, Daily With Dinner    heparin (porcine) 25,000 units in 0.45% NaCl 250 mL infusion (premix), Titrated, Last Rate: 15.1 Units/kg/hr (01/15/25 1339)    heparin (porcine) injection 2,000 Units, Q6H PRN    heparin (porcine) injection 4,000 Units, Q6H PRN    hydrALAZINE (APRESOLINE) injection 5 mg, Q6H PRN    insulin lispro (HumALOG/ADMELOG) 100 units/mL subcutaneous injection 1-6 Units, 4x Daily (AC & HS)    [Held by provider] losartan (COZAAR) tablet 25 mg, Daily    metoprolol tartrate (LOPRESSOR) tablet 25 mg, Q12H ARIEL    nitroglycerin (NITROSTAT) SL tablet 0.4 mg, Q5 Min PRN    ondansetron (ZOFRAN) injection 4 mg, Q6H PRN    Administrative Statements   Today, Patient Was Seen By: Galen Daley MD      **Please Note: This note may have been constructed using a voice recognition system.**

## 2025-01-15 NOTE — ASSESSMENT & PLAN NOTE
-Endorses noncompliance with medication for over 2 years  -Days prior to presentation he had nausea and dizziness, in addition to right-sided chest pain with radiation to his back   -CTA (1/14/2025): No acute aortic injury, mild bilateral groundglass opacities.  Coronary artery calcification with hepatic asteatosis.

## 2025-01-15 NOTE — PLAN OF CARE
Problem: PAIN - ADULT  Goal: Verbalizes/displays adequate comfort level or baseline comfort level  Description: Interventions:  - Encourage patient to monitor pain and request assistance  - Assess pain using appropriate pain scale  - Administer analgesics based on type and severity of pain and evaluate response  - Implement non-pharmacological measures as appropriate and evaluate response  - Consider cultural and social influences on pain and pain management  - Notify physician/advanced practitioner if interventions unsuccessful or patient reports new pain  Outcome: Progressing     Problem: Knowledge Deficit  Goal: Patient/family/caregiver demonstrates understanding of disease process, treatment plan, medications, and discharge instructions  Description: Complete learning assessment and assess knowledge base.  Interventions:  - Provide teaching at level of understanding  - Provide teaching via preferred learning methods  Outcome: Progressing     Problem: CARDIOVASCULAR - ADULT  Goal: Maintains optimal cardiac output and hemodynamic stability  Description: INTERVENTIONS:  - Monitor I/O, vital signs and rhythm  - Monitor for S/S and trends of decreased cardiac output  - Administer and titrate ordered vasoactive medications to optimize hemodynamic stability  - Assess quality of pulses, skin color and temperature  - Assess for signs of decreased coronary artery perfusion  - Instruct patient to report change in severity of symptoms  Outcome: Progressing  Goal: Absence of cardiac dysrhythmias or at baseline rhythm  Description: INTERVENTIONS:  - Continuous cardiac monitoring, vital signs, obtain 12 lead EKG if ordered  - Administer antiarrhythmic and heart rate control medications as ordered  - Monitor electrolytes and administer replacement therapy as ordered  Outcome: Progressing     Problem: MUSCULOSKELETAL - ADULT  Goal: Maintain or return mobility to safest level of function  Description: INTERVENTIONS:  - Assess  patient's ability to carry out ADLs; assess patient's baseline for ADL function and identify physical deficits which impact ability to perform ADLs (bathing, care of mouth/teeth, toileting, grooming, dressing, etc.)  - Assess/evaluate cause of self-care deficits   - Assess range of motion  - Assess patient's mobility  - Assess patient's need for assistive devices and provide as appropriate  - Encourage maximum independence but intervene and supervise when necessary  - Involve family in performance of ADLs  - Assess for home care needs following discharge   - Consider OT consult to assist with ADL evaluation and planning for discharge  - Provide patient education as appropriate  Outcome: Progressing  Goal: Maintain proper alignment of affected body part  Description: INTERVENTIONS:  - Support, maintain and protect limb and body alignment  - Provide patient/ family with appropriate education  Outcome: Progressing

## 2025-01-15 NOTE — CASE MANAGEMENT
Case Management Assessment & Discharge Planning Note    Patient name Segun Grissom  [facility]  /E4 -* MRN [id number]  : 1965 Date 1/15/2025       Current Admission Date: 2025  Current Admission Diagnosis:NSTEMI (non-ST elevated myocardial infarction) (Formerly [facility])   Patient Active Problem List    Diagnosis Date Noted Date Diagnosed    NSTEMI (non-ST elevated myocardial infarction) (Formerly [facility]) 01/15/2025     Type 2 diabetes mellitus without complication, without long-term current use of insulin (Formerly [facility]) 01/15/2025     Encounter for medication review and counseling 2023     Body mass index (BMI) 32.0-32.9, adult 03/15/2023     Acquired trigger finger 2023     Carpal tunnel syndrome 2023     Pain in right hand 2023     Leg swelling 2022     CPAP (continuous positive airway pressure) dependence 2022     Chest pain, exertional 10/14/2021     Hamstring injury, right, initial encounter 2021     Moderate obstructive sleep apnea 2021     Rash 2021     Loud snoring 2021     Asthma 2021     Tinnitus of both ears 2021     Allergic rhinitis 2021     Other male erectile dysfunction 2021     Dyslipidemia 2014     Hypertension 2014     Arthritis 2014     Chronic nasal congestion 2014     Dizziness, nonspecific 2014       LOS (days): 0  Geometric Mean LOS (GMLOS) (days):   Days to GMLOS:     OBJECTIVE:              Current admission status: Observation       Preferred Pharmacy:   [place] [address] [facility] [address]  Phone: [phone] Fax: [phone]    Primary Care Provider: Rena Maurice MD    Primary Insurance:   Secondary Insurance:     ASSESSMENT:  Active Health Care Proxies       Luli Cordova Alternate Health Care Representative - Spouse   Primary Phone: [phone] (Mobile)                 Advance  Directives  Does patient have a Health Care POA?: No  Was patient offered paperwork?: Yes (Declined)  Does patient currently have a Health Care decision maker?: Yes, please see Health Care Proxy section  Does patient have Advance Directives?: No  Was patient offered paperwork?: Yes (Declined)         Readmission Root Cause  30 Day Readmission: No    Patient Information  Admitted from:: Home  Mental Status: Alert  During Assessment patient was accompanied by: Spouse  Assessment information provided by:: Patient  Primary Caregiver: Self  Support Systems: Spouse/significant other  County of Residence: [place]  What city do you live in?: [place]  Home entry access options. Select all that apply.: Stairs  Number of steps to enter home.: 5  Do the steps have railings?: Yes  Type of Current Residence: 2 story home  Upon entering residence, is there a bedroom on the main floor (no further steps)?: No  A bedroom is located on the following floor levels of residence (select all that apply):: 2nd Floor  Upon entering residence, is there a bathroom on the main floor (no further steps)?: No  Indicate which floors of current residence have a bathroom (select all the apply):: 2nd Floor  Number of steps to 2nd floor from main floor: One Flight  Living Arrangements: Lives w/ Spouse/significant other  Is patient a ?: No    Activities of Daily Living Prior to Admission  Functional Status: Independent  Completes ADLs independently?: Yes  Ambulates independently?: Yes  Does patient use assisted devices?: No  Does patient currently own DME?: No  Does patient have a history of Outpatient Therapy (PT/OT)?: No  Does the patient have a history of Short-Term Rehab?: No  Does patient have a history of HHC?: No  Does patient currently have HHC?: No         Patient Information Continued  Income Source: Unemployed  Does patient have prescription coverage?: No  Does patient receive dialysis treatments?: No  Does patient have a history of  substance abuse?: No  Does patient have a history of Mental Health Diagnosis?: No         Means of Transportation  Means of Transport to [facility]:: Public Transportation - Bus      Social Determinants of Health (SDOH)      Flowsheet Row Most Recent Value   Housing Stability    In the last 12 months, was there a time when you were not able to pay the mortgage or rent on time? N   In the past 12 months, how many times have you moved where you were living? 0   At any time in the past 12 months, were you homeless or living in a shelter (including now)? N   Transportation Needs    In the past 12 months, has lack of transportation kept you from medical appointments or from getting medications? yes   Food Insecurity    Within the past 12 months, you worried that your food would run out before you got the money to buy more. Never true   Within the past 12 months, the food you bought just didn't last and you didn't have money to get more. Never true   Utilities    In the past 12 months has the electric, gas, oil, or water company threatened to shut off services in your home? No            DISCHARGE DETAILS:    Discharge planning discussed with:: Patient and wife  Freedom of Choice: Yes  Comments - Freedom of Choice: Referral to [facility] and hospital financial counselors  CM contacted family/caregiver?: Yes  Were Treatment Team discharge recommendations reviewed with patient/caregiver?: Yes  Did patient/caregiver verbalize understanding of patient care needs?: Yes  Were patient/caregiver advised of the risks associated with not following Treatment Team discharge recommendations?: Yes    Contacts  Patient Contacts: abiodun Rockwell  Relationship to Patient:: Family  Contact Method: In Person  Reason/Outcome: Continuity of Care, Emergency Contact, Discharge Planning    Requested Home Health Care         Is the patient interested in HHC at discharge?: No    DME Referral Provided  Referral made for DME?: No    Other  Referral/Resources/Interventions Provided:  Financial Resources Provided: Financial Counselor  Interventions: PCP    Would you like to participate in our [facility] service program?  : No - Declined    Treatment Team Recommendation: Home  Discharge Destination Plan:: Home  Transport at Discharge : Ride Share                 CM met with patient and patient's wife at bedside to introduce self and role with DC planning.  Patient resides with his wife and son in a 2 story home.  Patient was independent PTA, he does not utilize any DME.  Patient currently unemployed and without insurance, agreeable to PATHS referral.  Patient also has not been to PCP in almost a year, he would like to reestablish care at [facility].  Patient relies on public transportation and will need a lyft arranged at time of DC.    CM sent inbasket message requesting TCM appointment for patient.  CM also sent email to hospital financial counselors for PATHS referral and to see what percentage of assistance patient would qualify for.  CM will continue to follow.

## 2025-01-15 NOTE — UTILIZATION REVIEW
5/5 Placed back in isolette due to persistent low temperatures of 97.3 and 97.2 double wrapped and clothing. Infant has prenatal history of neurologic compromise secondary to sustained maternal seizures. Obtained labs and KUB but suspected decline due to cold stress. CBC, BMP and VBG normal. Blood Culture negative. No antibiotics warranted. KUB with mildly dilated stomach and intestines but infant received full feeding prior. Abdomen without bowel loops  Soft not as full and has become active since initiated warming. Discussed findings with Dr. Mcdonald, feedings resumed. Stable temperatures in open crib since 5/17 and tolerating full volume feedings.     Plan: Continue to monitor feeds and temperatures in open crib.   Initial Clinical Review    OBSERVATION 1/14 CHANGED TO INPATIENT ON 1/15 @ 1350 - NSTEMI and ischemic work up.     Admission: Date/Time/Statement:   Admission Orders (From admission, onward)       Ordered        01/15/25 1350  INPATIENT ADMISSION  Once            01/14/25 2228  Place in Observation  Once                          Orders Placed This Encounter   Procedures    INPATIENT ADMISSION     Standing Status:   Standing     Number of Occurrences:   1     Level of Care:   Med Surg [16]     Estimated length of stay:   More than 2 Midnights     Certification:   I certify that inpatient services are medically necessary for this patient for a duration of greater than two midnights. See H&P and MD Progress Notes for additional information about the patient's course of treatment.     ED Arrival Information       Expected   -    Arrival   1/14/2025 18:29    Acuity   Emergent              Means of arrival   Ambulance    Escorted by   [facility] ([facility])    Service   Hospitalist    Admission type   Emergency              Arrival complaint   Chest Pain             Chief Complaint   Patient presents with    Chest Pain     Pt arrives via ems, from home. Pt reports having intermittent chest pain for a couple of weeks, describes the pain traveling form the center to the R shoulder and through the back.        Initial Presentation: 59 y.o. male presents to the ED via EMS from home with c/o intermittent CP x several days.  PMH: HTN, HLD, asthma, family h/o CAD, NIDDM.    In the ED He was tachycardic and HTN. Pain rated 4/10.  Treated with SL NTG, ASA, Heparin bolus and drip.  Labs - elevated BNP, troponins, glucose.  ECG - ST, LVH, T wave abnormality.  Imaging - mild bilat GGO likely inflammatory, hepatic steatosis. On exam normal HR and rhythm, clear lungs, A&O.  Admitted to Observation Status  with NSTEMI, dyslipidemia, HTN  - PLAN: cardio consult, Tele, Heparin drip, echo, restart Amlodipine, SSI cover. NPO for  poss cardiac cath.      Anticipated Length of Stay/Certification Statement: Patient will be admitted on an observation basis with an anticipated length of stay of less than 2 midnights secondary to NSTEMI.     Date: 1/15 CHANGED TO INPATIENT STATUS TODAY:  NSTEMI, dyslipidemia, HTN  - no further c/o CP.  Home meds restarted and new start Lipitor, Metoprolol.  NPO for poss Cardiac Cath on 1/16.  Pt had episode of R side CP with r/t back in the evening.  Had abnormal EKG with diffuse T wave inversions, increased from prior.  Currently on heparin drip and Nitropaste.  If CP occurs again will start NTG drip.      1/15 Cardio Consult - NSTEMI likely type II, HTN Emergency, dyslipidemia, h/o med noncompliance - intermittent  R side stabbing CP w/ r/t back x 1 day - has had in past with increasing frequency. Dizziness, nausea x several days PTA. No meds x 2 yrs d/t no insurance. Escalating troponins. CTA neg for aortic dissection and PE.     Date: 1/16 Day 2:   NSTEMI, dyslipidemia, HTN  - remains on Heparin drip.  No further reported CP since last evening. For Cardiac cath today.  Had MAGALI x 1, continue DAPT x 12 months, cardiac rehab on d/c.  Increase metoprolol to tartrate dose to 50 mg twice daily.  Increase  isosorbide mononitrate to 30 mg p.o. twice daily.  Can wean off of nitroglycerin drip.Begin losartan 50 mg daily tomorrow.  Plavix 600 mg x 1 today and 75 mg starting 1/17.      +++++++++++++++  1/16 Cardiac Cath  Impression:     S/P PCI with MAGALI x 1 to the Prox LAD 95% stenosis and culprit for his NSTEMI    Prox LAD lesion is 95% stenosed.    The angioplasty was pre-stent angioplasty.  Recommendation:  Recommendation Cont GDMT optimization for CAD  DAPT for at least 12 months given PCI/ACS presentation  Aggressive risk factor reduction   on diet/lifestyle modification  Cardiac rehab upon discharge   +++++++++++++++    ED Treatment-Medication Administration from 01/14/2025 4107 to 01/14/2025 1156          Date/Time Order Dose Route Action     01/14/2025 2110 iohexol (OMNIPAQUE) 350 MG/ML injection (SINGLE-DOSE) 100 mL 100 mL Intravenous Given     01/14/2025 2120 nitroglycerin (NITROSTAT) SL tablet 0.4 mg 0.4 mg Sublingual Given     01/14/2025 2157 aspirin chewable tablet 324 mg 324 mg Oral Given     01/14/2025 2243 heparin (porcine) injection 4,000 Units 4,000 Units Intravenous Given     01/14/2025 2243 heparin (porcine) 25,000 units in 0.45% NaCl 250 mL infusion (premix) 11.1 Units/kg/hr Intravenous New Bag            Scheduled Medications:  amLODIPine, 10 mg, Oral, Daily  aspirin, 81 mg, Oral, Daily  atorvastatin, 80 mg, Oral, Daily With Dinner  clopidogrel, 600 mg, Oral, Once  [START ON 1/17/2025] clopidogrel, 75 mg, Oral, Daily  insulin lispro, 1-6 Units, Subcutaneous, 4x Daily (AC & HS)  isosorbide mononitrate, 30 mg, Oral, BID  [START ON 1/17/2025] losartan, 50 mg, Oral, Daily  metoprolol tartrate, 50 mg, Oral, Q12H ARIEL      Continuous IV Infusions:  sodium chloride, 50 mL/hr, Intravenous, Continuous  NTG drip during cath.      PRN Meds:  acetaminophen, 650 mg, Oral, Q6H PRN - x 2 1/15  aluminum-magnesium hydroxide-simethicone, 30 mL, Oral, Q6H PRN  diphenhydrAMINE, 50 mg, Oral, HS PRN  fluticasone, 2 spray, Each Nare, Daily PRN  heparin (porcine), 2,000 Units, Intravenous, Q6H PRN - x 1 1/15  heparin (porcine), 4,000 Units, Intravenous, Q6H PRN- x 1 1/15  hydrALAZINE, 5 mg, Intravenous, Q6H PRN- x 1 1/15  nitroglycerin, 0.4 mg, Sublingual, Q5 Min PRN  ondansetron, 4 mg, Intravenous, Q6H PRN      ED Triage Vitals   Temperature Pulse Respirations Blood Pressure SpO2 Pain Score   01/14/25 1833 01/14/25 1833 01/14/25 1833 01/14/25 1833 01/14/25 1833 01/14/25 2144   99.1 °F (37.3 °C) (!) 113 20 (!) 234/129 98 % 4     Weight (last 2 days)       Date/Time Weight    01/15/25 1610 91.6 (202)    01/14/25 2348 91.8 (202.38)    01/14/25 1833 94.7 (208.78)            Vital Signs (last 3 days)       Date/Time Temp Pulse  Resp BP MAP (mmHg) SpO2 Calculated FIO2 (%) - Nasal Cannula Nasal Cannula O2 Flow Rate (L/min) O2 Device Patient Position - Orthostatic VS Pain    01/16/25 1600 98 °F (36.7 °C) 104 20 153/89 114 -- -- -- -- Lying --    01/16/25 1500 98 °F (36.7 °C) 103 20 147/90 114 -- -- -- -- Lying --    01/16/25 1457 -- -- -- -- -- -- -- -- -- -- 6    01/16/25 1430 97.8 °F (36.6 °C) 102 20 144/96 113 -- -- -- -- -- --    01/16/25 1400 98.1 °F (36.7 °C) 86 20 159/78 110 97 % -- -- None (Room air) Lying --    01/16/25 1346 97.9 °F (36.6 °C) 83 20 145/94 113 98 % -- -- None (Room air) Lying --    01/16/25 1309 97 °F (36.1 °C) 75 19 166/90 115 97 % -- -- None (Room air) Lying --    01/16/25 12:34:04 -- -- -- -- -- -- -- -- -- -- No Pain    01/16/25 11:46:58 -- -- -- -- -- -- 28 2 L/min Nasal cannula -- --    01/16/25 0900 -- -- -- -- -- -- -- -- -- -- No Pain    01/16/25 0744 98 °F (36.7 °C) 84 18 159/82 114 93 % -- -- None (Room air) Lying --    01/16/25 0240 97.1 °F (36.2 °C) 86 20 158/96 114 97 % -- -- -- Lying --    01/15/25 2256 98.4 °F (36.9 °C) 83 19 155/78 106 98 % -- -- None (Room air) Lying --    01/15/25 2028 -- 100 -- 138/83 98 -- -- -- -- Lying --    01/15/25 2022 -- 89 18 163/100 115 -- -- -- -- Lying --    01/15/25 2000 -- -- -- -- -- -- -- -- -- -- No Pain    01/15/25 1937 98.5 °F (36.9 °C) 95 19 139/70 98 99 % -- -- None (Room air) Lying --    01/15/25 1610 -- 93 18 158/98 110 -- -- -- -- Lying --    01/15/25 1439 97.5 °F (36.4 °C) 115 18 185/92 108 98 % -- -- None (Room air) Lying --    01/15/25 1336 -- 100 18 159/93 109 -- -- -- -- -- --    01/15/25 1235 -- -- -- -- -- -- -- -- -- -- 2    01/15/25 1130 99 °F (37.2 °C) 91 18 157/95 109 97 % -- -- None (Room air) Lying --    01/15/25 1006 -- -- -- 168/95 117 -- -- -- -- Lying --    01/15/25 0842 -- -- -- -- -- -- -- -- -- -- 3    01/15/25 0823 99.4 °F (37.4 °C) 94 18 186/97 118 97 % -- -- None (Room air) Lying --    01/15/25 0519 -- -- -- -- -- -- -- -- -- -- 5     01/15/25 0338 -- -- -- 152/72 102 -- -- -- -- Lying --    01/15/25 0256 97.6 °F (36.4 °C) 94 18 177/92 126 96 % -- -- None (Room air) Lying --    01/14/25 2348 98.9 °F (37.2 °C) 97 20 197/92 -- 97 % -- -- None (Room air) Lying --    01/14/25 2306 -- -- -- -- -- -- -- -- -- -- No Pain    01/14/25 2245 -- 102 20 160/90 118 100 % -- -- None (Room air) -- --    01/14/25 2145 -- 102 22 149/92 112 100 % -- -- None (Room air) -- --    01/14/25 2144 -- -- -- -- -- -- -- -- -- -- 4    01/14/25 2130 -- 108 24 154/106 126 100 % -- -- None (Room air) Lying --    01/14/25 2045 -- 100 22 167/88 120 98 % -- -- None (Room air) -- --    01/14/25 2030 -- 100 18 167/90 120 97 % -- -- -- -- --    01/14/25 2000 -- 102 -- 164/85 117 97 % -- -- None (Room air) Lying --    01/14/25 1912 -- -- -- 185/112 -- -- -- -- -- Lying --    01/14/25 1910 -- 104 18 194/111 144 97 % -- -- None (Room air) Lying --    01/14/25 1906 -- 112 17 179/87 123 99 % -- -- None (Room air) Lying --    01/14/25 1845 -- 108 20 205/111 150 99 % -- -- None (Room air) Lying --    01/14/25 1833 99.1 °F (37.3 °C) 113 20 234/129 172 98 % -- -- None (Room air) Lying --              Pertinent Labs/Diagnostic Test Results:   Radiology:  CTA dissection protocol chest/abdomen/pelvis   ED Interpretation by Lesley Velasquez DO (01/14 2204)   IMPRESSION:     1.  No acute aortic injury.  2.  Mild bilateral groundglass opacities, favored to be inflammatory.  3.  Coronary artery calcifications.  4.  Hepatic steatosis.        Final Interpretation by Ciro Izquierdo MD (01/14 2158)      1.  No acute aortic injury.   2.  Mild bilateral groundglass opacities, favored to be inflammatory.   3.  Coronary artery calcifications.   4.  Hepatic steatosis.     Cardiology:  ECG 12 lead   Final Result by Germain Elliott DO (01/15 1249)   Sinus tachycardia   T wave abnormality, consider anterolateral ischemia   Abnormal ECG   When compared with ECG of 14-Jan-2025 23:36, (unconfirmed)   No  significant change was found   Confirmed by Germain Elliott ([id number]) on 1/15/2025 12:49:22 PM      ECG 12 lead   Final Result by Germain Elliott DO (01/15 1249)   Normal sinus rhythm   Minimal voltage criteria for LVH, may be normal variant   T wave abnormality, consider anterolateral ischemia   Abnormal ECG   When compared with ECG of 14-Jan-2025 23:35, (unconfirmed)   No significant change was found   Confirmed by Germain Elliott ([id number]) on 1/15/2025 12:49:16 PM      ECG 12 lead   Final Result by Germain Elliott DO (01/15 1249)   Normal sinus rhythm   Minimal voltage criteria for LVH, may be normal variant   T wave abnormality, consider anterolateral ischemia   Prolonged QT   Abnormal ECG   Confirmed by Germain Elliott ([id number]) on 1/15/2025 12:49:11 PM      ECG 12 lead   Final Result by Germain Elliott DO (01/15 1248)   Age and gender specific ECG analysis   Sinus tachycardia   Left ventricular hypertrophy with repolarization abnormality   Abnormal ECG   Confirmed by Germain Elliott ([id number]) on 1/15/2025 12:48:44 PM      ECG 12 lead   Final Result by Germain Elliott DO (01/15 1248)   Age and gender specific ECG analysis    Sinus tachycardia   Minimal voltage criteria for LVH, may be normal variant   Nonspecific T wave abnormality   Abnormal ECG   Confirmed by Germain Elliott ([id number]) on 1/15/2025 12:48:04 PM        GI:  No orders to display           Results from last 7 days   Lab Units 01/16/25  0542 01/15/25  0447 01/14/25  1906   WBC Thousand/uL 9.47 9.02 9.86   HEMOGLOBIN g/dL 13.9 13.1 14.3   HEMATOCRIT % 42.5 39.9 43.8   PLATELETS Thousands/uL 265 289 313   TOTAL NEUT ABS Thousands/µL 6.09  --  6.21         Results from last 7 days   Lab Units 01/16/25  0542 01/15/25  0447 01/14/25  1906   SODIUM mmol/L 140 140 138   POTASSIUM mmol/L 4.2 3.6 3.6   CHLORIDE mmol/L 107 107 102   CO2 mmol/L 27 24 27   ANION GAP mmol/L 6 9 9   BUN mg/dL 20 14 17   CREATININE mg/dL 0.95 0.88 1.11   EGFR ml/min/1.73sq  m 86 92 71   CALCIUM mg/dL 9.1 8.5 9.0   MAGNESIUM mg/dL 2.0 1.9  --    PHOSPHORUS mg/dL 2.7 2.6  --      Results from last 7 days   Lab Units 01/16/25  0542 01/15/25  0447 01/14/25  1906   AST U/L 19 25 25   ALT U/L 26 29 34   ALK PHOS U/L 69 66 78   TOTAL PROTEIN g/dL 6.9 6.6 7.5   ALBUMIN g/dL 3.9 3.6 4.1   TOTAL BILIRUBIN mg/dL 0.75 0.55 0.33     Results from last 7 days   Lab Units 01/16/25  1618 01/16/25  0804 01/15/25  2105 01/15/25  1553 01/15/25  1132 01/15/25  0826   POC GLUCOSE mg/dl 184* 120 124 160* 134 136     Results from last 7 days   Lab Units 01/16/25  0542 01/15/25  0447 01/14/25  1906   GLUCOSE RANDOM mg/dL 123 137 225*         Results from last 7 days   Lab Units 01/14/25  1906   HEMOGLOBIN A1C % 6.7*   EAG mg/dl 146       Results from last 7 days   Lab Units 01/14/25  2318 01/14/25  2104 01/14/25  1906   HS TNI 0HR ng/L  --   --  212*   HS TNI 2HR ng/L  --  790*  --    HSTNI D2 ng/L  --  578*  --    HS TNI 4HR ng/L 1,733*  --   --    HSTNI D4 ng/L 1,521*  --   --      Results from last 7 days   Lab Units 01/14/25  1906   D-DIMER QUANTITATIVE ug/ml FEU <0.27     Results from last 7 days   Lab Units 01/16/25  0553 01/16/25  0034 01/15/25  1748 01/15/25  1136 01/15/25  0447 01/15/25  0446 01/14/25  2238   PROTIME seconds  --   --   --   --  14.4  --  13.6   INR   --   --   --   --  1.10  --  1.02   PTT seconds 73* 97* 59*   < >  --    < > 26    < > = values in this interval not displayed.       Results from last 7 days   Lab Units 01/15/25  0447   BNP pg/mL 102*     Past Medical History:   Diagnosis Date    Asthma     Hyperlipidemia     Hypertension     Sinus congestion      Present on Admission:   Dyslipidemia   Hypertension   Asthma      Admitting Diagnosis: Chest pain [R07.9]  Elevated blood pressure reading [R03.0]  Elevated troponin [R79.89]  Age/Sex: 59 y.o. male    Network Utilization Review Department  ATTENTION: Please call with any questions or concerns to [phone] and carefully  listen to the prompts so that you are directed to the right person. All voicemails are confidential.   For Discharge needs, contact Care Management DC Support Team at [phone] opt. 2  Send all requests for admission clinical reviews, approved or denied determinations and any other requests to dedicated fax number below belonging to the campus where the patient is receiving treatment. List of dedicated fax numbers for the Facilities:  FACILITY NAME UR FAX NUMBER   ADMISSION DENIALS (Administrative/Medical Necessity) [phone]   DISCHARGE SUPPORT TEAM (NETWORK) [phone]   PARENT CHILD HEALTH (Maternity/NICU/Pediatrics) [phone]   [facility] [phone]   [facility] [phone]   [facility] [phone]   [facility] [phone]   [facility] [phone]   [facility] [phone]   [facility] [phone]   [facility] [phone]   [facility] [phone]   [facility] [phone]   [facility] [phone]   [facility] [phone]

## 2025-01-15 NOTE — ASSESSMENT & PLAN NOTE
Lab Results   Component Value Date    HGBA1C 6.7 (H) 01/14/2025       Recent Labs     01/15/25  0826 01/15/25  1132   POCGLU 136 134       Blood Sugar Average: Last 72 hrs:  (P) 135

## 2025-01-15 NOTE — ASSESSMENT & PLAN NOTE
Known history  Preciously on Albuterol inhaler, Montelukast 10mg, cetrizine 10mg and Advair 230-21 inhaler   Not compliant

## 2025-01-15 NOTE — H&P
"H&P - Hospitalist   Name: Segun Grissom 59 y.o. male I MRN: [id number]  Unit/Bed#: E4 -01 I Date of Admission: 1/14/2025   Date of Service: 1/15/2025 I Hospital Day: 0     Assessment & Plan  NSTEMI (non-ST elevated myocardial infarction) (HCC)  Patient is 59-year-old male with past medical history of hypertension, hyperlipidemia, and asthma who presents to the hospital with chest pain intermittent for the last few days.  He has a family history of CAD.  In the emergency department his initial troponin was elevated, he was given nitro and started on heparin infusion will be admitted for further monitoring.  Cardiology consulted  Continue heparin infusion  Troponins 212-->790-->1733  Check BNP  Monitor telemetry  EKG appears nonischemic, however does show LVH  Check echocardiogram  Dyslipidemia  Patient no longer takes statin  Lipid panel 221/130/43/152  Hypertension  Patient hypertensive in the emergency room, now stable with systolics in the 150s  No longer taking antihypertensives at home  Restarted on amlodipine 5 mg daily  Routine vital signs  Asthma  No acute exacerbation  Patient no longer uses an inhaler  Body mass index (BMI) 32.0-32.9, adult  Patient's BMI is 32.67  Benefit greatly from a very low carbohydrate diet (less than 50 g daily) and intermittent fasting  Triglyceride to HDL ratio about 3, A1c 6.7.  Patient has significant metabolic dysfunction  Type 2 diabetes mellitus without complication, without long-term current use of insulin ([facility])  Sliding scale insulin  ACHS glucose checks  N.p.o. for now, patient may need to go to the Cath Lab  Hypo-glycemia protocol    Lab Results   Component Value Date    HGBA1C 6.7 (H) 01/14/2025       No results for input(s): \"POCGLU\" in the last 72 hours.    Blood Sugar Average: Last 72 hrs:          VTE Pharmacologic Prophylaxis: VTE Score: 4 Moderate Risk (Score 3-4) - Pharmacological DVT Prophylaxis Ordered: heparin drip.  Code Status: Level 1 - Full " Code   Discussion with patient and his wife at bedside    Anticipated Length of Stay: Patient will be admitted on an observation basis with an anticipated length of stay of less than 2 midnights secondary to NSTEMI.    History of Present Illness   Chief Complaint: Chest pain    Segun Grissom is 59-year-old male with past medical history of hypertension, hyperlipidemia, and asthma who presents to the hospital with chest pain intermittent for the last few days.  He has a family history of CAD.  In the emergency department his initial troponin was elevated, he was given nitro and started on heparin infusion will be admitted for further monitoring.     Review of Systems   Constitutional:  Negative for chills and fever.   HENT:  Negative for ear pain and sore throat.    Eyes:  Negative for pain and visual disturbance.   Respiratory:  Negative for cough and shortness of breath.    Cardiovascular:  Positive for chest pain. Negative for palpitations.   Gastrointestinal:  Negative for abdominal pain and vomiting.   Genitourinary:  Negative for dysuria and hematuria.   Musculoskeletal:  Negative for arthralgias and back pain.   Skin:  Negative for color change and rash.   Neurological:  Negative for seizures and syncope.   All other systems reviewed and are negative.      Historical Information   Past Medical History:   Diagnosis Date    Asthma     Hyperlipidemia     Hypertension     Sinus congestion      Past Surgical History:   Procedure Laterality Date    CARPAL TUNNEL RELEASE      right hand     Social History     Tobacco Use    Smoking status: Never    Smokeless tobacco: Never   Vaping Use    Vaping status: Never Used   Substance and Sexual Activity    Alcohol use: Never    Drug use: Never    Sexual activity: Not on file     E-Cigarette/Vaping    E-Cigarette Use Never User      E-Cigarette/Vaping Substances     No family history on file.  Social History:  Marital Status: Single   Occupation: Unemployed  Patient  Pre-hospital Living Situation: Home, With spouse  Patient Pre-hospital Level of Mobility: walks  Patient Pre-hospital Diet Restrictions: None at all    Meds/Allergies   I have reviewed home medications with patient personally.  Prior to Admission medications    Medication Sig Start Date End Date Taking? Authorizing Provider   acetaminophen (TYLENOL) 500 mg tablet Take 1,000 mg by mouth every 6 (six) hours as needed for mild pain   Yes Historical Provider, MD   Advair -21 MCG/ACT inhaler INHALE 2 PUFFS 2 (TWO) TIMES A DAY RINSE MOUTH AFTER USE.  Patient not taking: Reported on 1/14/2025 7/28/23   Donald Cifuentes MD   albuterol (PROVENTIL HFA,VENTOLIN HFA) 90 mcg/act inhaler INHALE 1 PUFF EVERY 6 (SIX) HOURS AS NEEDED FOR WHEEZING  Patient not taking: Reported on 1/14/2025 7/28/23   Donald Cifuentes MD   amLODIPine (NORVASC) 5 mg tablet TAKE 1 TABLET (5 MG TOTAL) BY MOUTH DAILY 11/10/23 5/8/24  Donald Cifuentes MD   atorvastatin (LIPITOR) 40 mg tablet TAKE 1 TABLET (40 MG TOTAL) BY MOUTH DAILY 11/10/23 5/8/24  Donald Cifuentes MD   Blood Pressure Monitoring ([employer] BP Monitor/Arm) KELLY Use daily as needed (dizziness)  Patient not taking: Reported on 1/14/2025 3/22/23   Donald Cifuentes MD   cetirizine (ZyrTEC) 10 mg tablet Take 1 tablet (10 mg total) by mouth daily  Patient not taking: Reported on 1/14/2025 3/15/23   Donald Cifuentes MD   hydrochlorothiazide (HYDRODIURIL) 25 mg tablet TAKE 0.5 TABLETS (12.5 MG TOTAL) BY MOUTH DAILY 11/10/23 5/8/24  Donald Cifuentes MD   lisinopril (ZESTRIL) 40 mg tablet TAKE 1 TABLET (40 MG TOTAL) BY MOUTH DAILY  Patient not taking: Reported on 1/14/2025 11/1/23   Donald Cifuentes MD   montelukast (SINGULAIR) 10 mg tablet Take 1 tablet (10 mg total) by mouth daily at bedtime  Patient not taking: Reported on 1/14/2025 3/15/23   Donald Cifuentes MD     No Known Allergies    Objective :  Temp:  [97.6  °F (36.4 °C)-99.1 °F (37.3 °C)] 97.6 °F (36.4 °C)  HR:  [] 94  BP: (149-234)/() 152/72  Resp:  [17-24] 18  SpO2:  [96 %-100 %] 96 %  O2 Device: None (Room air)    Physical Exam  Vitals and nursing note reviewed.   Constitutional:       General: He is not in acute distress.     Appearance: He is well-developed.   HENT:      Head: Normocephalic and atraumatic.   Eyes:      Conjunctiva/sclera: Conjunctivae normal.   Cardiovascular:      Rate and Rhythm: Normal rate and regular rhythm.      Heart sounds: No murmur heard.  Pulmonary:      Effort: Pulmonary effort is normal. No respiratory distress.      Breath sounds: Normal breath sounds.   Abdominal:      Palpations: Abdomen is soft.      Tenderness: There is no abdominal tenderness.   Musculoskeletal:         General: No swelling.      Cervical back: Neck supple.   Skin:     General: Skin is warm and dry.      Capillary Refill: Capillary refill takes less than 2 seconds.   Neurological:      Mental Status: He is alert.   Psychiatric:         Mood and Affect: Mood normal.          Lines/Drains:            Lab Results: I have reviewed the following results:  Results from last 7 days   Lab Units 01/15/25  0447 01/14/25  1906   WBC Thousand/uL 9.02 9.86   HEMOGLOBIN g/dL 13.1 14.3   HEMATOCRIT % 39.9 43.8   PLATELETS Thousands/uL 289 313   SEGS PCT %  --  63   LYMPHO PCT %  --  29   MONO PCT %  --  7   EOS PCT %  --  1     Results from last 7 days   Lab Units 01/15/25  0447   SODIUM mmol/L 140   POTASSIUM mmol/L 3.6   CHLORIDE mmol/L 107   CO2 mmol/L 24   BUN mg/dL 14   CREATININE mg/dL 0.88   ANION GAP mmol/L 9   CALCIUM mg/dL 8.5   ALBUMIN g/dL 3.6   TOTAL BILIRUBIN mg/dL 0.55   ALK PHOS U/L 66   ALT U/L 29   AST U/L 25   GLUCOSE RANDOM mg/dL 137     Results from last 7 days   Lab Units 01/15/25  0447   INR  1.10         Lab Results   Component Value Date    HGBA1C 6.7 (H) 01/14/2025    HGBA1C 6.1 (H) 03/20/2023    HGBA1C 6.0 (H) 02/08/2021           Imaging  Results Review: I reviewed radiology reports from this admission including: CT chest.  Other Study Results Review: EKG was personally reviewed and my interpretation is: Sinus Tachycardia. , LVH noted..      ** Please Note: This note has been constructed using a voice recognition system. **

## 2025-01-15 NOTE — ASSESSMENT & PLAN NOTE
Sliding scale insulin  ACHS glucose checks  Consistent carb diet  Hypo-glycemia protocol    Lab Results   Component Value Date    HGBA1C 6.7 (H) 01/14/2025       Recent Labs     01/15/25  0826 01/15/25  1132 01/15/25  1553   POCGLU 136 134 160*       Blood Sugar Average: Last 72 hrs:  (P) [id number]

## 2025-01-15 NOTE — ASSESSMENT & PLAN NOTE
Patient is 59-year-old male with past medical history of hypertension, hyperlipidemia, and asthma who presents to the hospital with chest pain intermittent for the last few days.  He has a family history of CAD.  In the emergency department his initial troponin was elevated, he was given nitro and started on heparin infusion will be admitted for further monitoring.  Cardiology consulted  Continue heparin infusion  Troponins 212-->790-->1733  Check BNP  Monitor telemetry  EKG appears nonischemic, however does show LVH  Check echocardiogram

## 2025-01-15 NOTE — ASSESSMENT & PLAN NOTE
Patient hypertensive in the emergency room, now stable with systolics in the 150s  No longer taking antihypertensives at home  Restarted on amlodipine 5 mg daily and losartan 25 mg daily  Cardiology added metoprolol 25 mg twice daily; will hold losartan tomorrow in anticipation of heart cath  Routine vital signs

## 2025-01-15 NOTE — ASSESSMENT & PLAN NOTE
Patient is 59-year-old male with past medical history of hypertension, hyperlipidemia, and asthma who presents to the hospital with chest pain intermittent for the last few days.  He has a family history of CAD.  In the emergency department his initial troponin was elevated, he was given nitro and started on heparin infusion will be admitted for further monitoring.  Cardiology consulted and appreciate recommendations  Continue heparin infusion  Troponins 212-->790-->1733-->2077  Check BNP-102  Monitor on telemetry  EKG appears nonischemic, however does show LVH  Check echocardiogram  1/15-update: As per cardiology, likely type II MI; better blood pressure control needed; continue ACS protocol for now; n.p.o. at midnight in case patient would require heart cath moving forward.

## 2025-01-16 PROBLEM — R79.89 ELEVATED TROPONIN LEVEL NOT DUE TO ACUTE CORONARY SYNDROME: Status: ACTIVE | Noted: 2025-01-15

## 2025-01-16 LAB
ALBUMIN SERPL BCG-MCNC: 3.9 G/DL (ref 3.5–5)
ALP SERPL-CCNC: 69 U/L (ref 34–104)
ALT SERPL W P-5'-P-CCNC: 26 U/L (ref 7–52)
ANION GAP SERPL CALCULATED.3IONS-SCNC: 6 MMOL/L (ref 4–13)
APTT PPP: 73 SECONDS (ref 23–34)
APTT PPP: 97 SECONDS (ref 23–34)
AST SERPL W P-5'-P-CCNC: 19 U/L (ref 13–39)
BASOPHILS # BLD AUTO: 0.03 THOUSANDS/ΜL (ref 0–0.1)
BASOPHILS NFR BLD AUTO: 0 % (ref 0–1)
BILIRUB SERPL-MCNC: 0.75 MG/DL (ref 0.2–1)
BUN SERPL-MCNC: 20 MG/DL (ref 5–25)
CALCIUM SERPL-MCNC: 9.1 MG/DL (ref 8.4–10.2)
CHLORIDE SERPL-SCNC: 107 MMOL/L (ref 96–108)
CO2 SERPL-SCNC: 27 MMOL/L (ref 21–32)
CREAT SERPL-MCNC: 0.95 MG/DL (ref 0.6–1.3)
EOSINOPHIL # BLD AUTO: 0.1 THOUSAND/ΜL (ref 0–0.61)
EOSINOPHIL NFR BLD AUTO: 1 % (ref 0–6)
ERYTHROCYTE [DISTWIDTH] IN BLOOD BY AUTOMATED COUNT: 15.1 % (ref 11.6–15.1)
GFR SERPL CREATININE-BSD FRML MDRD: 86 ML/MIN/1.73SQ M
GLUCOSE SERPL-MCNC: 120 MG/DL (ref 65–140)
GLUCOSE SERPL-MCNC: 123 MG/DL (ref 65–140)
GLUCOSE SERPL-MCNC: 141 MG/DL (ref 65–140)
GLUCOSE SERPL-MCNC: 184 MG/DL (ref 65–140)
HCT VFR BLD AUTO: 42.5 % (ref 36.5–49.3)
HGB BLD-MCNC: 13.9 G/DL (ref 12–17)
IMM GRANULOCYTES # BLD AUTO: 0.04 THOUSAND/UL (ref 0–0.2)
IMM GRANULOCYTES NFR BLD AUTO: 0 % (ref 0–2)
KCT BLD-ACNC: 357 SEC (ref 89–137)
LYMPHOCYTES # BLD AUTO: 2.36 THOUSANDS/ΜL (ref 0.6–4.47)
LYMPHOCYTES NFR BLD AUTO: 25 % (ref 14–44)
MAGNESIUM SERPL-MCNC: 2 MG/DL (ref 1.9–2.7)
MCH RBC QN AUTO: 27.9 PG (ref 26.8–34.3)
MCHC RBC AUTO-ENTMCNC: 32.7 G/DL (ref 31.4–37.4)
MCV RBC AUTO: 85 FL (ref 82–98)
MONOCYTES # BLD AUTO: 0.85 THOUSAND/ΜL (ref 0.17–1.22)
MONOCYTES NFR BLD AUTO: 9 % (ref 4–12)
NEUTROPHILS # BLD AUTO: 6.09 THOUSANDS/ΜL (ref 1.85–7.62)
NEUTS SEG NFR BLD AUTO: 65 % (ref 43–75)
NRBC BLD AUTO-RTO: 0 /100 WBCS
PHOSPHATE SERPL-MCNC: 2.7 MG/DL (ref 2.3–4.1)
PLATELET # BLD AUTO: 265 THOUSANDS/UL (ref 149–390)
PMV BLD AUTO: 10.8 FL (ref 8.9–12.7)
POTASSIUM SERPL-SCNC: 4.2 MMOL/L (ref 3.5–5.3)
PROT SERPL-MCNC: 6.9 G/DL (ref 6.4–8.4)
RBC # BLD AUTO: 4.99 MILLION/UL (ref 3.88–5.62)
SODIUM SERPL-SCNC: 140 MMOL/L (ref 135–147)
SPECIMEN SOURCE: ABNORMAL
WBC # BLD AUTO: 9.47 THOUSAND/UL (ref 4.31–10.16)

## 2025-01-16 PROCEDURE — 93454 CORONARY ARTERY ANGIO S&I: CPT | Performed by: INTERNAL MEDICINE

## 2025-01-16 PROCEDURE — C1887 CATHETER, GUIDING: HCPCS | Performed by: INTERNAL MEDICINE

## 2025-01-16 PROCEDURE — 82948 REAGENT STRIP/BLOOD GLUCOSE: CPT

## 2025-01-16 PROCEDURE — 027034Z DILATION OF CORONARY ARTERY, ONE ARTERY WITH DRUG-ELUTING INTRALUMINAL DEVICE, PERCUTANEOUS APPROACH: ICD-10-PCS | Performed by: INTERNAL MEDICINE

## 2025-01-16 PROCEDURE — 80053 COMPREHEN METABOLIC PANEL: CPT | Performed by: INTERNAL MEDICINE

## 2025-01-16 PROCEDURE — 84100 ASSAY OF PHOSPHORUS: CPT | Performed by: INTERNAL MEDICINE

## 2025-01-16 PROCEDURE — 83735 ASSAY OF MAGNESIUM: CPT | Performed by: INTERNAL MEDICINE

## 2025-01-16 PROCEDURE — 99152 MOD SED SAME PHYS/QHP 5/>YRS: CPT | Performed by: INTERNAL MEDICINE

## 2025-01-16 PROCEDURE — 99232 SBSQ HOSP IP/OBS MODERATE 35: CPT | Performed by: INTERNAL MEDICINE

## 2025-01-16 PROCEDURE — C9600 PERC DRUG-EL COR STENT SING: HCPCS | Performed by: INTERNAL MEDICINE

## 2025-01-16 PROCEDURE — 85730 THROMBOPLASTIN TIME PARTIAL: CPT | Performed by: INTERNAL MEDICINE

## 2025-01-16 PROCEDURE — C1725 CATH, TRANSLUMIN NON-LASER: HCPCS | Performed by: INTERNAL MEDICINE

## 2025-01-16 PROCEDURE — B2111ZZ FLUOROSCOPY OF MULTIPLE CORONARY ARTERIES USING LOW OSMOLAR CONTRAST: ICD-10-PCS | Performed by: INTERNAL MEDICINE

## 2025-01-16 PROCEDURE — C1894 INTRO/SHEATH, NON-LASER: HCPCS | Performed by: INTERNAL MEDICINE

## 2025-01-16 PROCEDURE — 99153 MOD SED SAME PHYS/QHP EA: CPT | Performed by: INTERNAL MEDICINE

## 2025-01-16 PROCEDURE — 85025 COMPLETE CBC W/AUTO DIFF WBC: CPT | Performed by: INTERNAL MEDICINE

## 2025-01-16 PROCEDURE — C1874 STENT, COATED/COV W/DEL SYS: HCPCS | Performed by: INTERNAL MEDICINE

## 2025-01-16 PROCEDURE — C1769 GUIDE WIRE: HCPCS | Performed by: INTERNAL MEDICINE

## 2025-01-16 PROCEDURE — 85347 COAGULATION TIME ACTIVATED: CPT

## 2025-01-16 PROCEDURE — 92928 PRQ TCAT PLMT NTRAC ST 1 LES: CPT | Performed by: INTERNAL MEDICINE

## 2025-01-16 DEVICE — STENT ONYXNG35022UX ONYX 3.50X22RX
Type: IMPLANTABLE DEVICE | Status: FUNCTIONAL
Brand: ONYX FRONTIER™

## 2025-01-16 RX ORDER — LOSARTAN POTASSIUM 50 MG/1
50 TABLET ORAL DAILY
Status: DISCONTINUED | OUTPATIENT
Start: 2025-01-17 | End: 2025-01-17 | Stop reason: HOSPADM

## 2025-01-16 RX ORDER — NITROGLYCERIN 20 MG/100ML
INJECTION INTRAVENOUS
Status: COMPLETED | OUTPATIENT
Start: 2025-01-16 | End: 2025-01-16

## 2025-01-16 RX ORDER — SODIUM CHLORIDE 9 MG/ML
50 INJECTION, SOLUTION INTRAVENOUS CONTINUOUS
Status: DISPENSED | OUTPATIENT
Start: 2025-01-16 | End: 2025-01-16

## 2025-01-16 RX ORDER — CLOPIDOGREL BISULFATE 75 MG/1
75 TABLET ORAL DAILY
Status: DISCONTINUED | OUTPATIENT
Start: 2025-01-17 | End: 2025-01-17 | Stop reason: HOSPADM

## 2025-01-16 RX ORDER — ONDANSETRON 2 MG/ML
4 INJECTION INTRAMUSCULAR; INTRAVENOUS ONCE AS NEEDED
Status: DISCONTINUED | OUTPATIENT
Start: 2025-01-16 | End: 2025-01-17 | Stop reason: SDUPTHER

## 2025-01-16 RX ORDER — OXYCODONE AND ACETAMINOPHEN 5; 325 MG/1; MG/1
1 TABLET ORAL EVERY 4 HOURS PRN
Status: DISCONTINUED | OUTPATIENT
Start: 2025-01-16 | End: 2025-01-17 | Stop reason: HOSPADM

## 2025-01-16 RX ORDER — VERAPAMIL HYDROCHLORIDE 2.5 MG/ML
INJECTION, SOLUTION INTRAVENOUS CODE/TRAUMA/SEDATION MEDICATION
Status: DISCONTINUED | OUTPATIENT
Start: 2025-01-16 | End: 2025-01-16 | Stop reason: HOSPADM

## 2025-01-16 RX ORDER — NITROGLYCERIN 20 MG/100ML
5-200 INJECTION INTRAVENOUS
Status: DISCONTINUED | OUTPATIENT
Start: 2025-01-16 | End: 2025-01-16

## 2025-01-16 RX ORDER — MIDAZOLAM HYDROCHLORIDE 2 MG/2ML
INJECTION, SOLUTION INTRAMUSCULAR; INTRAVENOUS CODE/TRAUMA/SEDATION MEDICATION
Status: DISCONTINUED | OUTPATIENT
Start: 2025-01-16 | End: 2025-01-16 | Stop reason: HOSPADM

## 2025-01-16 RX ORDER — HEPARIN SODIUM 1000 [USP'U]/ML
INJECTION, SOLUTION INTRAVENOUS; SUBCUTANEOUS CODE/TRAUMA/SEDATION MEDICATION
Status: DISCONTINUED | OUTPATIENT
Start: 2025-01-16 | End: 2025-01-16 | Stop reason: HOSPADM

## 2025-01-16 RX ORDER — NITROGLYCERIN 0.4 MG/1
0.4 TABLET SUBLINGUAL ONCE AS NEEDED
Status: DISCONTINUED | OUTPATIENT
Start: 2025-01-16 | End: 2025-01-17 | Stop reason: HOSPADM

## 2025-01-16 RX ORDER — CLOPIDOGREL BISULFATE 75 MG/1
600 TABLET ORAL ONCE
Status: COMPLETED | OUTPATIENT
Start: 2025-01-16 | End: 2025-01-16

## 2025-01-16 RX ORDER — NITROGLYCERIN 20 MG/100ML
INJECTION INTRAVENOUS CODE/TRAUMA/SEDATION MEDICATION
Status: DISCONTINUED | OUTPATIENT
Start: 2025-01-16 | End: 2025-01-16 | Stop reason: HOSPADM

## 2025-01-16 RX ORDER — ISOSORBIDE MONONITRATE 30 MG/1
30 TABLET, EXTENDED RELEASE ORAL 2 TIMES DAILY
Status: DISCONTINUED | OUTPATIENT
Start: 2025-01-16 | End: 2025-01-17

## 2025-01-16 RX ORDER — LIDOCAINE HYDROCHLORIDE 10 MG/ML
INJECTION, SOLUTION EPIDURAL; INFILTRATION; INTRACAUDAL; PERINEURAL CODE/TRAUMA/SEDATION MEDICATION
Status: DISCONTINUED | OUTPATIENT
Start: 2025-01-16 | End: 2025-01-16 | Stop reason: HOSPADM

## 2025-01-16 RX ORDER — ISOSORBIDE MONONITRATE 30 MG/1
30 TABLET, EXTENDED RELEASE ORAL 2 TIMES DAILY
Status: DISCONTINUED | OUTPATIENT
Start: 2025-01-16 | End: 2025-01-16

## 2025-01-16 RX ORDER — METOPROLOL TARTRATE 50 MG
50 TABLET ORAL EVERY 12 HOURS SCHEDULED
Status: DISCONTINUED | OUTPATIENT
Start: 2025-01-16 | End: 2025-01-17 | Stop reason: HOSPADM

## 2025-01-16 RX ORDER — ISOSORBIDE MONONITRATE 30 MG/1
30 TABLET, EXTENDED RELEASE ORAL DAILY
Status: DISCONTINUED | OUTPATIENT
Start: 2025-01-16 | End: 2025-01-16

## 2025-01-16 RX ORDER — ACETAMINOPHEN 325 MG/1
975 TABLET ORAL ONCE AS NEEDED
Status: DISCONTINUED | OUTPATIENT
Start: 2025-01-16 | End: 2025-01-16

## 2025-01-16 RX ORDER — FENTANYL CITRATE 50 UG/ML
INJECTION, SOLUTION INTRAMUSCULAR; INTRAVENOUS CODE/TRAUMA/SEDATION MEDICATION
Status: DISCONTINUED | OUTPATIENT
Start: 2025-01-16 | End: 2025-01-16 | Stop reason: HOSPADM

## 2025-01-16 RX ADMIN — INSULIN LISPRO 1 UNITS: 100 INJECTION, SOLUTION INTRAVENOUS; SUBCUTANEOUS at 16:36

## 2025-01-16 RX ADMIN — CLOPIDOGREL BISULFATE 600 MG: 75 TABLET ORAL at 18:32

## 2025-01-16 RX ADMIN — METOPROLOL TARTRATE 25 MG: 25 TABLET, FILM COATED ORAL at 08:00

## 2025-01-16 RX ADMIN — HEPARIN SODIUM 15.1 UNITS/KG/HR: 10000 INJECTION, SOLUTION INTRAVENOUS at 06:28

## 2025-01-16 RX ADMIN — METOPROLOL TARTRATE 50 MG: 50 TABLET, FILM COATED ORAL at 21:44

## 2025-01-16 RX ADMIN — ACETAMINOPHEN 650 MG: 325 TABLET, FILM COATED ORAL at 14:57

## 2025-01-16 RX ADMIN — AMLODIPINE BESYLATE 10 MG: 10 TABLET ORAL at 08:00

## 2025-01-16 RX ADMIN — SODIUM CHLORIDE 50 ML/HR: 0.9 INJECTION, SOLUTION INTRAVENOUS at 15:00

## 2025-01-16 RX ADMIN — ASPIRIN 81 MG: 81 TABLET, COATED ORAL at 08:00

## 2025-01-16 RX ADMIN — ATORVASTATIN CALCIUM 80 MG: 80 TABLET, FILM COATED ORAL at 16:36

## 2025-01-16 RX ADMIN — ISOSORBIDE MONONITRATE 30 MG: 30 TABLET, EXTENDED RELEASE ORAL at 16:36

## 2025-01-16 NOTE — ASSESSMENT & PLAN NOTE
Sliding scale insulin  ACHS glucose checks  Consistent carb diet  Hypo-glycemia protocol    Lab Results   Component Value Date    HGBA1C 6.7 (H) 01/14/2025       Recent Labs     01/15/25  1553 01/15/25  2105 01/16/25  0804 01/16/25  1618   POCGLU 160* 124 120 184*       Blood Sugar Average: Last 72 hrs:  (P) 143

## 2025-01-16 NOTE — PROGRESS NOTES
Progress Note - Cardiology   Name: Segun Grissom 59 y.o. male I MRN: [id number]  Unit/Bed#: E4 -01 I Date of Admission: 1/14/2025   Date of Service: 1/16/2025 I Hospital Day: 1         Interval update:   Patient seen and examined.  Continued to have right sided chest pain overnight, ECG showed T-wave inversion, slightly more notable compared to prior study. Troponin drop, from 2077 to 1374. He was started on Nitro drip to control BP. Now off nitro drip.      Plan:   - [facility]( 1/16/25):     S/P PCI with MAGALI x 1 to the Prox LAD 95% stenosis and culprit for his NSTEMI    Prox LAD lesion is 95% stenosed.    The angioplasty was pre-stent angioplasty.   - Continue with aspirin 81 mg, Atorvastatin 80mg QHS   - Received Plavix in cath lab, patient is uninsured. Will switch to Plavix.   - Continue with Amlodipine 10mg QD, Metoprolol tartrate 25mg BID,   - Increased Imdur to 30mg BID   - Increased Losartan to 50mg QD   - X1 Plavix 600mg loading dose in the AM   - Continue with Plavix 75mg QD   - outpatient follow up with Cardiology, referral to cardiac rehab   - Telemetry overnight.     Assessment & Plan  Elevated troponin level not due to acute coronary syndrome  -Presented with right-sided chest pain and elevated troponin levels.  Initial /129.  Days prior to presentation he was nausea and dizziness   -Endorses noncompliance with all his medications for over 2 years, mainly due to lack of health insurance.  -Risk factors for CAD include uncontrolled hypertension, diabetes and hyperlipidemia  -Rise in troponin likely due to hypertensive emergency.  -Coronary artery calcification in the mid to distal LAD and left circumflex noted on chest CT.  - [facility](1/16/25):    S/P PCI with MAGALI x 1 to the Prox LAD 95% stenosis and culprit for his NSTEMI    Prox LAD lesion is 95% stenosed.    The angioplasty was pre-stent angioplasty.     Hypertensive emergency- Improved  -Endorses noncompliance with medication for over 2  "years  -Days prior to presentation he had nausea and dizziness, in addition to right-sided chest pain with radiation to his back   -[facility] (1/14/2025): No acute aortic injury, mild bilateral groundglass opacities.  Coronary artery calcification with hepatic asteatosis.  Dyslipidemia  Noncompliant with medications  -Cholesterol 221, LDL calculated 152, HDL 43  Hypertension  -Not compliant with medication   - Previously on Amlodipine 5mg, hydrochlorothiazide 12.5mg, Lisinopril 40mg QD  Asthma  Known history  Preciously on Albuterol inhaler, Montelukast 10mg, cetrizine 10mg and Advair 230-21 inhaler   Not compliant     Body mass index (BMI) 32.0-32.9, adult    Type 2 diabetes mellitus without complication, without long-term current use of insulin ([facility])  Lab Results   Component Value Date    HGBA1C 6.7 (H) 01/14/2025       Recent Labs     01/15/25  1132 01/15/25  1553 01/15/25  2105 01/16/25  0804   POCGLU 134 160* 124 120       Blood Sugar Average: Last 72 hrs:  (P) 134.8            Case discussed and reviewed with Dr. Mcguire who agrees with my assessment and plan.Thank you for involving us in the care of your patient.    Vivek Lema MD  Cardiology Fellow   PGY-6        Vitals: Blood pressure 159/82, pulse 84, temperature 98 °F (36.7 °C), temperature source Temporal, resp. rate 18, height 5' 6\" (1.676 m), weight 91.6 kg (202 lb), SpO2 93%., Body mass index is 32.6 kg/m².,   Orthostatic Blood Pressures      Flowsheet Row Most Recent Value   Blood Pressure 159/82 filed at 01/16/2025 0744   Patient Position - Orthostatic VS Lying filed at 01/16/2025 0744              Intake/Output Summary (Last 24 hours) at 1/16/2025 0907  Last data filed at 1/16/2025 0601  Gross per 24 hour   Intake 0 ml   Output --   Net 0 ml   Review of System:  Review of system was conducted and was negative except for as stated in the subjective course.    Physical Exam:    GEN: Segun Grissom appears well, alert and oriented x 3, pleasant " and cooperative   NECK: No JVD or carotid bruits   HEART: Regular rhythm, normal rate, normal S1 and S2, no murmurs, clicks, gallops or rubs   LUNGS: Clear to auscultation bilaterally; no wheezes, rales, or rhonchi; respiration nonlabored   ABDOMEN:  Normoactive bowel sounds, soft, no tenderness, no distention  EXTREMITIES: peripheral pulses palpable; no edema        Current Facility-Administered Medications:     acetaminophen (TYLENOL) tablet 650 mg, 650 mg, Oral, Q6H PRN, MAREN Kaba, 650 mg at 01/15/25 1235    aluminum-magnesium hydroxide-simethicone (MAALOX) oral suspension 30 mL, 30 mL, Oral, Q6H PRN, MAREN Kaba    amLODIPine (NORVASC) tablet 10 mg, 10 mg, Oral, Daily, Maninder Mcguire MD, 10 mg at 01/16/25 0800    aspirin (ECOTRIN LOW STRENGTH) EC tablet 81 mg, 81 mg, Oral, Daily, Maninder Mcguire MD, 81 mg at 01/16/25 0800    atorvastatin (LIPITOR) tablet 80 mg, 80 mg, Oral, Daily With Dinner, Maninder Mcguire MD, 80 mg at 01/15/25 1658    diphenhydrAMINE (BENADRYL) tablet 50 mg, 50 mg, Oral, HS PRN, MAREN Mendosa    fluticasone (FLONASE) 50 mcg/act nasal spray 2 spray, 2 spray, Each Nare, Daily PRN, MAREN Mendosa    heparin (porcine) 25,000 units in 0.45% NaCl 250 mL infusion (premix), 3-20 Units/kg/hr (Order-Specific), Intravenous, Titrated, Lesley Velasquez DO, Last Rate: 13.6 mL/hr at 01/16/25 0628, 15.1 Units/kg/hr at 01/16/25 0628    heparin (porcine) injection 2,000 Units, 2,000 Units, Intravenous, Q6H PRN, Lesley Velasquez DO, 2,000 Units at 01/15/25 1822    heparin (porcine) injection 4,000 Units, 4,000 Units, Intravenous, Q6H PRN, Lesley Velasquez DO, 4,000 Units at 01/15/25 0516    hydrALAZINE (APRESOLINE) injection 5 mg, 5 mg, Intravenous, Q6H PRN, MAREN Kaba, 5 mg at 01/15/25 0323    insulin lispro (HumALOG/ADMELOG) 100 units/mL subcutaneous injection 1-6 Units, 1-6 Units, Subcutaneous, 4x Daily (AC & HS), Gabe Summers  MAREN Buckner, 1 Units at 01/15/25 1659    [Held by provider] losartan (COZAAR) tablet 25 mg, 25 mg, Oral, Daily, Maninder Mcguire MD, 25 mg at 01/15/25 1235    metoprolol tartrate (LOPRESSOR) tablet 25 mg, 25 mg, Oral, Q12H ARIEL, Maninder Mcguire MD, 25 mg at 01/16/25 0800    nitroglycerin (NITROSTAT) SL tablet 0.4 mg, 0.4 mg, Sublingual, Q5 Min PRN, MAREN Kaba, 0.4 mg at 01/15/25 2028    ondansetron (ZOFRAN) injection 4 mg, 4 mg, Intravenous, Q6H PRN, MAREN Kaba    Labs & Results:  Results from last 7 days   Lab Units 01/15/25  2201 01/15/25  1211 01/14/25  2318 01/14/25  2104 01/14/25  1906   HS TNI 0HR ng/L  --   --   --   --  212*   HS TNI 2HR ng/L  --   --   --  790*  --    HS TNI 4HR ng/L  --   --  1,733*  --   --    HS TNI RAND ng/L 1,374*   < >  --   --   --     < > = values in this interval not displayed.         Results from last 7 days   Lab Units 01/16/25  0542 01/15/25  0447 01/14/25  1906   POTASSIUM mmol/L 4.2 3.6 3.6   CO2 mmol/L 27 24 27   CHLORIDE mmol/L 107 107 102   BUN mg/dL 20 14 17   CREATININE mg/dL 0.95 0.88 1.11     Results from last 7 days   Lab Units 01/16/25  0542 01/15/25  0447 01/14/25  1906   HEMOGLOBIN g/dL 13.9 13.1 14.3   HEMATOCRIT % 42.5 39.9 43.8   PLATELETS Thousands/uL 265 289 313     Results from last 7 days   Lab Units 01/15/25  0447 01/14/25  1906   TRIGLYCERIDES mg/dL 130  --    HDL mg/dL 43  --    LDL CALC mg/dL 152*  --    HEMOGLOBIN A1C %  --  6.7*

## 2025-01-16 NOTE — ASSESSMENT & PLAN NOTE
Lab Results   Component Value Date    HGBA1C 6.7 (H) 01/14/2025       Recent Labs     01/15/25  1132 01/15/25  1553 01/15/25  2105 01/16/25  0804   POCGLU 134 160* 124 120       Blood Sugar Average: Last 72 hrs:  (P) 134.8

## 2025-01-16 NOTE — PROGRESS NOTES
Progress Note - Hospitalist   Name: Segun Grissom 59 y.o. male I MRN: [id number]  Unit/Bed#: E4 -01 I Date of Admission: 1/14/2025   Date of Service: 1/16/2025 I Hospital Day: 1    Assessment & Plan  Elevated troponin level not due to acute coronary syndrome  Patient is 59-year-old male with past medical history of hypertension, hyperlipidemia, and asthma who presents to the hospital with chest pain intermittent for the last few days.  He has a family history of CAD.  In the emergency department his initial troponin was elevated, he was given nitro and started on heparin infusion will be admitted for further monitoring.  Cardiology consulted and appreciate recommendations  Continue heparin infusion  Troponins 212-->790-->1733-->2077-->1734-->1374  Check BNP-102  Monitor on telemetry  EKG appears nonischemic, however does show LVH  Check echocardiogram  1/15-update: As per cardiology, likely type II MI; better blood pressure control needed; continue ACS protocol for now; n.p.o. at midnight in case patient would require heart cath moving forward.    1/16-update: Patient experience further chest pain overnight, taken for left heart cath today and received LAD stent.  Please see cardiology notes for further description of coronary blockages.  Continue Lipitor 80 mg and start aspirin and Plavix.  Will need tighter blood pressure control; cardiology increase metoprolol to 50 mg twice daily, Imdur 30 mg twice a day and tomorrow start losartan 50 mg daily.  Dyslipidemia  Patient no longer takes statin  Lipid panel 221/130/43/152  Reinitiate statin therapy    Hypertension  Patient hypertensive in the emergency room, now stable with systolics in the 150s  No longer taking antihypertensives at home  Restarted on amlodipine 5 mg daily and losartan 25 mg daily  Cardiology added metoprolol 25 mg twice daily; will hold losartan tomorrow in anticipation of heart cath  Routine vital signs  Asthma  No acute  exacerbation  Patient no longer uses an inhaler  Body mass index (BMI) 32.0-32.9, adult  Patient's BMI is 32.67  Benefit greatly from a very low carbohydrate diet (less than 50 g daily) and intermittent fasting  Triglyceride to HDL ratio about 3, A1c 6.7.  Patient has significant metabolic dysfunction  Type 2 diabetes mellitus without complication, without long-term current use of insulin (HCC)  Sliding scale insulin  ACHS glucose checks  Consistent carb diet  Hypo-glycemia protocol    Lab Results   Component Value Date    HGBA1C 6.7 (H) 01/14/2025       Recent Labs     01/15/25  1553 01/15/25  2105 01/16/25  0804 01/16/25  1618   POCGLU 160* 124 120 184*       Blood Sugar Average: Last 72 hrs:  (P) 143    Hypertensive emergency- Improved      VTE Pharmacologic Prophylaxis: VTE Score: 4 Moderate Risk (Score 3-4) - Pharmacological DVT Prophylaxis Contraindicated. Sequential Compression Devices Ordered.    Mobility:   Basic Mobility Inpatient Raw Score: 24  JH-HLM Goal: 8: Walk 250 feet or more  JH-HLM Achieved: 8: Walk 250 feet ot more  JH-HLM Goal achieved. Continue to encourage appropriate mobility.    Patient Centered Rounds: I performed bedside rounds with nursing staff today.   Discussions with Specialists or Other Care Team Provider: Cardiology    Education and Discussions with Family / Patient: Care plan discussed with patient who voiced understanding and agrees with recommendations.      Current Length of Stay: 1 day(s)  Current Patient Status: Inpatient   Certification Statement: The patient will continue to require additional inpatient hospital stay due to treatment of coronary artery disease  Discharge Plan: Anticipate discharge in 24-48 hrs to home.    Code Status: Level 1 - Full Code    Subjective   Patient seen and examined bedside, on exam reported he was having no further chest pain.  Unfortunately, overnight, did have chest pain and was taken for left heart cath today.  Received LAD stenting and was  started on aspirin and Plavix.  Goal is for better blood pressure and lipid control.  Medications adjusted.  Otherwise labs and vital stable and patient likely stable for discharge tomorrow pending cardiology clearance.    Objective :  Temp:  [97 °F (36.1 °C)-98.5 °F (36.9 °C)] 98 °F (36.7 °C)  HR:  [] 104  BP: (138-166)/() 153/89  Resp:  [18-20] 20  SpO2:  [93 %-99 %] 97 %  O2 Device: None (Room air)  Nasal Cannula O2 Flow Rate (L/min):  [2 L/min] 2 L/min    Body mass index is 32.6 kg/m².     Input and Output Summary (last 24 hours):     Intake/Output Summary (Last 24 hours) at 1/16/2025 1635  Last data filed at 1/16/2025 0601  Gross per 24 hour   Intake 0 ml   Output --   Net 0 ml       Physical Exam  Vitals and nursing note reviewed.   Constitutional:       General: He is not in acute distress.     Appearance: He is well-developed.   HENT:      Head: Normocephalic and atraumatic.   Eyes:      Conjunctiva/sclera: Conjunctivae normal.   Cardiovascular:      Rate and Rhythm: Normal rate.      Heart sounds: No murmur heard.  Pulmonary:      Effort: Pulmonary effort is normal. No respiratory distress.   Abdominal:      Palpations: Abdomen is soft.      Tenderness: There is no abdominal tenderness.   Musculoskeletal:         General: No swelling.      Cervical back: Neck supple.   Skin:     General: Skin is warm and dry.   Neurological:      Mental Status: He is alert and oriented to person, place, and time.   Psychiatric:         Mood and Affect: Mood normal.           Lines/Drains:              Lab Results: I have reviewed the following results:   Results from last 7 days   Lab Units 01/16/25  0542   WBC Thousand/uL 9.47   HEMOGLOBIN g/dL 13.9   HEMATOCRIT % 42.5   PLATELETS Thousands/uL 265   SEGS PCT % 65   LYMPHO PCT % 25   MONO PCT % 9   EOS PCT % 1     Results from last 7 days   Lab Units 01/16/25  0542   SODIUM mmol/L 140   POTASSIUM mmol/L 4.2   CHLORIDE mmol/L 107   CO2 mmol/L 27   BUN mg/dL 20    CREATININE mg/dL 0.95   ANION GAP mmol/L 6   CALCIUM mg/dL 9.1   ALBUMIN g/dL 3.9   TOTAL BILIRUBIN mg/dL 0.75   ALK PHOS U/L 69   ALT U/L 26   AST U/L 19   GLUCOSE RANDOM mg/dL 123     Results from last 7 days   Lab Units 01/15/25  0447   INR  1.10     Results from last 7 days   Lab Units 01/16/25  1618 01/16/25  0804 01/15/25  2105 01/15/25  1553 01/15/25  1132 01/15/25  0826   POC GLUCOSE mg/dl 184* 120 124 160* 134 136     Results from last 7 days   Lab Units 01/14/25  1906   HEMOGLOBIN A1C % 6.7*           Recent Cultures (last 7 days):         Imaging Results Review: I reviewed radiology reports from this admission including: procedure reports.      Last 24 Hours Medication List:     Current Facility-Administered Medications:     acetaminophen (TYLENOL) tablet 650 mg, Q6H PRN    acetaminophen (TYLENOL) tablet 975 mg, Once PRN    aluminum-magnesium hydroxide-simethicone (MAALOX) oral suspension 30 mL, Q6H PRN    amLODIPine (NORVASC) tablet 10 mg, Daily    aspirin (ECOTRIN LOW STRENGTH) EC tablet 81 mg, Daily    atorvastatin (LIPITOR) tablet 80 mg, Daily With Dinner    clopidogrel (PLAVIX) tablet 600 mg, Once    [START ON 1/17/2025] clopidogrel (PLAVIX) tablet 75 mg, Daily    diphenhydrAMINE (BENADRYL) tablet 50 mg, HS PRN    fluticasone (FLONASE) 50 mcg/act nasal spray 2 spray, Daily PRN    hydrALAZINE (APRESOLINE) injection 5 mg, Q6H PRN    insulin lispro (HumALOG/ADMELOG) 100 units/mL subcutaneous injection 1-6 Units, 4x Daily (AC & HS)    isosorbide mononitrate (IMDUR) 24 hr tablet 30 mg, BID    [START ON 1/17/2025] losartan (COZAAR) tablet 50 mg, Daily    metoprolol tartrate (LOPRESSOR) tablet 50 mg, Q12H ARIEL    nitroglycerin (NITROSTAT) SL tablet 0.4 mg, Q5 Min PRN    nitroglycerin (NITROSTAT) SL tablet 0.4 mg, Once PRN    ondansetron (ZOFRAN) injection 4 mg, Q6H PRN    ondansetron (ZOFRAN) injection 4 mg, Once PRN    oxyCODONE-acetaminophen (PERCOCET) 5-325 mg per tablet 1 tablet, Q4H PRN    sodium  chloride 0.9 % infusion, Continuous, Last Rate: 50 mL/hr (01/16/25 1500)    Administrative Statements   Today, Patient Was Seen By: Galen Daley MD      **Please Note: This note may have been constructed using a voice recognition system.**

## 2025-01-16 NOTE — ASSESSMENT & PLAN NOTE
-Presented with right-sided chest pain and elevated troponin levels.  Initial /129.  Days prior to presentation he was nausea and dizziness   -Endorses noncompliance with all his medications for over 2 years, mainly due to lack of health insurance.  -Risk factors for CAD include uncontrolled hypertension, diabetes and hyperlipidemia  -Rise in troponin likely due to hypertensive emergency.  -Coronary artery calcification in the mid to distal LAD and left circumflex noted on chest CT.  - [facility](1/16/25):    S/P PCI with MAGALI x 1 to the Prox LAD 95% stenosis and culprit for his NSTEMI    Prox LAD lesion is 95% stenosed.    The angioplasty was pre-stent angioplasty.

## 2025-01-16 NOTE — QUICK NOTE
Patient reports right-sided chest pain radiating to back.  States similar to prior.  Denies associated dyspnea, nausea, diaphoresis or lightheadedness    Patient in NAD, states chest pain resolved.  Abnormal EKG with diffuse T wave inversions, increased from prior.  Currently on IV heparin and Nitropaste  NPO for possible cardiac cath tomorrow  Troponin downtrending 2077-->1734. Will repeat random troponin    D/w cardiology who agreed he has worsening T wave inversions. If he has recurrent chest pain or if his systolic blood pressure goes over 180, wipe off Nitropaste and start on a nitro infusion. Looks like he is on the schedule for left heart catheterization tomorrow.

## 2025-01-16 NOTE — ASSESSMENT & PLAN NOTE
Patient is 59-year-old male with past medical history of hypertension, hyperlipidemia, and asthma who presents to the hospital with chest pain intermittent for the last few days.  He has a family history of CAD.  In the emergency department his initial troponin was elevated, he was given nitro and started on heparin infusion will be admitted for further monitoring.  Cardiology consulted and appreciate recommendations  Continue heparin infusion  Troponins 212-->790-->1733-->2077-->1734-->1374  Check BNP-102  Monitor on telemetry  EKG appears nonischemic, however does show LVH  Check echocardiogram  1/15-update: As per cardiology, likely type II MI; better blood pressure control needed; continue ACS protocol for now; n.p.o. at midnight in case patient would require heart cath moving forward.    1/16-update: Patient experience further chest pain overnight, taken for left heart cath today and received LAD stent.  Please see cardiology notes for further description of coronary blockages.  Continue Lipitor 80 mg and start aspirin and Plavix.  Will need tighter blood pressure control; cardiology increase metoprolol to 50 mg twice daily, Imdur 30 mg twice a day and tomorrow start losartan 50 mg daily.

## 2025-01-17 ENCOUNTER — TRANSITIONAL CARE MANAGEMENT (OUTPATIENT)
Dept: FAMILY MEDICINE CLINIC | Facility: CLINIC | Age: 60
End: 2025-01-17

## 2025-01-17 VITALS
TEMPERATURE: 97.3 F | HEART RATE: 83 BPM | HEIGHT: 66 IN | SYSTOLIC BLOOD PRESSURE: 153 MMHG | RESPIRATION RATE: 16 BRPM | WEIGHT: 202 LBS | OXYGEN SATURATION: 95 % | BODY MASS INDEX: 32.47 KG/M2 | DIASTOLIC BLOOD PRESSURE: 91 MMHG

## 2025-01-17 LAB
ALBUMIN SERPL BCG-MCNC: 3.8 G/DL (ref 3.5–5)
ALP SERPL-CCNC: 64 U/L (ref 34–104)
ALT SERPL W P-5'-P-CCNC: 21 U/L (ref 7–52)
ANION GAP SERPL CALCULATED.3IONS-SCNC: 6 MMOL/L (ref 4–13)
AST SERPL W P-5'-P-CCNC: 16 U/L (ref 13–39)
BASOPHILS # BLD AUTO: 0.02 THOUSANDS/ΜL (ref 0–0.1)
BASOPHILS NFR BLD AUTO: 0 % (ref 0–1)
BILIRUB SERPL-MCNC: 0.82 MG/DL (ref 0.2–1)
BUN SERPL-MCNC: 19 MG/DL (ref 5–25)
CALCIUM SERPL-MCNC: 8.9 MG/DL (ref 8.4–10.2)
CHLORIDE SERPL-SCNC: 107 MMOL/L (ref 96–108)
CO2 SERPL-SCNC: 26 MMOL/L (ref 21–32)
CREAT SERPL-MCNC: 0.99 MG/DL (ref 0.6–1.3)
EOSINOPHIL # BLD AUTO: 0.1 THOUSAND/ΜL (ref 0–0.61)
EOSINOPHIL NFR BLD AUTO: 1 % (ref 0–6)
ERYTHROCYTE [DISTWIDTH] IN BLOOD BY AUTOMATED COUNT: 14.8 % (ref 11.6–15.1)
GFR SERPL CREATININE-BSD FRML MDRD: 81 ML/MIN/1.73SQ M
GLUCOSE SERPL-MCNC: 115 MG/DL (ref 65–140)
GLUCOSE SERPL-MCNC: 130 MG/DL (ref 65–140)
GLUCOSE SERPL-MCNC: 138 MG/DL (ref 65–140)
HCT VFR BLD AUTO: 40.2 % (ref 36.5–49.3)
HGB BLD-MCNC: 13.1 G/DL (ref 12–17)
IMM GRANULOCYTES # BLD AUTO: 0.02 THOUSAND/UL (ref 0–0.2)
IMM GRANULOCYTES NFR BLD AUTO: 0 % (ref 0–2)
LYMPHOCYTES # BLD AUTO: 1.79 THOUSANDS/ΜL (ref 0.6–4.47)
LYMPHOCYTES NFR BLD AUTO: 20 % (ref 14–44)
MAGNESIUM SERPL-MCNC: 2.1 MG/DL (ref 1.9–2.7)
MCH RBC QN AUTO: 28.1 PG (ref 26.8–34.3)
MCHC RBC AUTO-ENTMCNC: 32.6 G/DL (ref 31.4–37.4)
MCV RBC AUTO: 86 FL (ref 82–98)
MONOCYTES # BLD AUTO: 0.78 THOUSAND/ΜL (ref 0.17–1.22)
MONOCYTES NFR BLD AUTO: 9 % (ref 4–12)
NEUTROPHILS # BLD AUTO: 6.32 THOUSANDS/ΜL (ref 1.85–7.62)
NEUTS SEG NFR BLD AUTO: 70 % (ref 43–75)
NRBC BLD AUTO-RTO: 0 /100 WBCS
PHOSPHATE SERPL-MCNC: 2.9 MG/DL (ref 2.3–4.1)
PLATELET # BLD AUTO: 269 THOUSANDS/UL (ref 149–390)
PMV BLD AUTO: 11 FL (ref 8.9–12.7)
POTASSIUM SERPL-SCNC: 4 MMOL/L (ref 3.5–5.3)
PROT SERPL-MCNC: 6.8 G/DL (ref 6.4–8.4)
RBC # BLD AUTO: 4.67 MILLION/UL (ref 3.88–5.62)
SODIUM SERPL-SCNC: 139 MMOL/L (ref 135–147)
WBC # BLD AUTO: 9.03 THOUSAND/UL (ref 4.31–10.16)

## 2025-01-17 PROCEDURE — 99232 SBSQ HOSP IP/OBS MODERATE 35: CPT | Performed by: INTERNAL MEDICINE

## 2025-01-17 PROCEDURE — 99239 HOSP IP/OBS DSCHRG MGMT >30: CPT | Performed by: INTERNAL MEDICINE

## 2025-01-17 PROCEDURE — 84100 ASSAY OF PHOSPHORUS: CPT | Performed by: INTERNAL MEDICINE

## 2025-01-17 PROCEDURE — 83735 ASSAY OF MAGNESIUM: CPT | Performed by: INTERNAL MEDICINE

## 2025-01-17 PROCEDURE — 82948 REAGENT STRIP/BLOOD GLUCOSE: CPT

## 2025-01-17 PROCEDURE — 80053 COMPREHEN METABOLIC PANEL: CPT | Performed by: INTERNAL MEDICINE

## 2025-01-17 PROCEDURE — 85025 COMPLETE CBC W/AUTO DIFF WBC: CPT | Performed by: INTERNAL MEDICINE

## 2025-01-17 RX ORDER — ASPIRIN 81 MG/1
81 TABLET, CHEWABLE ORAL DAILY
Qty: 30 TABLET | Refills: 0 | Status: ON HOLD | OUTPATIENT
Start: 2025-01-17 | End: 2025-01-26

## 2025-01-17 RX ORDER — SPIRONOLACTONE 25 MG/1
25 TABLET ORAL DAILY
Status: DISCONTINUED | OUTPATIENT
Start: 2025-01-17 | End: 2025-01-17

## 2025-01-17 RX ORDER — CLOPIDOGREL BISULFATE 75 MG/1
75 TABLET ORAL DAILY
Qty: 30 TABLET | Refills: 0 | Status: SHIPPED | OUTPATIENT
Start: 2025-01-18 | End: 2025-01-26

## 2025-01-17 RX ORDER — ATORVASTATIN CALCIUM 80 MG/1
80 TABLET, FILM COATED ORAL
Qty: 30 TABLET | Refills: 0 | Status: ON HOLD | OUTPATIENT
Start: 2025-01-17 | End: 2025-01-26

## 2025-01-17 RX ORDER — SPIRONOLACTONE 50 MG/1
50 TABLET, FILM COATED ORAL DAILY
Qty: 30 TABLET | Refills: 0 | Status: SHIPPED | OUTPATIENT
Start: 2025-01-18 | End: 2025-01-30 | Stop reason: SDUPTHER

## 2025-01-17 RX ORDER — LOSARTAN POTASSIUM 50 MG/1
50 TABLET ORAL
Qty: 30 TABLET | Refills: 0 | Status: SHIPPED | OUTPATIENT
Start: 2025-01-17 | End: 2025-01-30 | Stop reason: SDUPTHER

## 2025-01-17 RX ORDER — ATORVASTATIN CALCIUM 80 MG/1
80 TABLET, FILM COATED ORAL
Qty: 30 TABLET | Refills: 0 | Status: SHIPPED | OUTPATIENT
Start: 2025-01-17 | End: 2025-01-17

## 2025-01-17 RX ORDER — METOPROLOL TARTRATE 50 MG
50 TABLET ORAL EVERY 12 HOURS SCHEDULED
Qty: 60 TABLET | Refills: 0 | Status: SHIPPED | OUTPATIENT
Start: 2025-01-17 | End: 2025-01-26

## 2025-01-17 RX ORDER — ISOSORBIDE MONONITRATE 60 MG/1
60 TABLET, EXTENDED RELEASE ORAL
Status: DISCONTINUED | OUTPATIENT
Start: 2025-01-18 | End: 2025-01-17 | Stop reason: HOSPADM

## 2025-01-17 RX ORDER — LOSARTAN POTASSIUM 50 MG/1
50 TABLET ORAL
Qty: 30 TABLET | Refills: 0 | Status: SHIPPED | OUTPATIENT
Start: 2025-01-17 | End: 2025-01-17

## 2025-01-17 RX ORDER — METOPROLOL TARTRATE 50 MG
50 TABLET ORAL EVERY 12 HOURS SCHEDULED
Qty: 60 TABLET | Refills: 0 | Status: SHIPPED | OUTPATIENT
Start: 2025-01-17 | End: 2025-01-17

## 2025-01-17 RX ORDER — ISOSORBIDE MONONITRATE 60 MG/1
60 TABLET, EXTENDED RELEASE ORAL
Qty: 30 TABLET | Refills: 0 | Status: SHIPPED | OUTPATIENT
Start: 2025-01-18 | End: 2025-01-26

## 2025-01-17 RX ORDER — SPIRONOLACTONE 50 MG/1
50 TABLET, FILM COATED ORAL DAILY
Status: DISCONTINUED | OUTPATIENT
Start: 2025-01-17 | End: 2025-01-17 | Stop reason: HOSPADM

## 2025-01-17 RX ORDER — SPIRONOLACTONE 50 MG/1
50 TABLET, FILM COATED ORAL DAILY
Qty: 30 TABLET | Refills: 0 | Status: SHIPPED | OUTPATIENT
Start: 2025-01-18 | End: 2025-01-17

## 2025-01-17 RX ORDER — AMLODIPINE BESYLATE 10 MG/1
10 TABLET ORAL
Qty: 30 TABLET | Refills: 0 | Status: SHIPPED | OUTPATIENT
Start: 2025-01-17 | End: 2025-01-17

## 2025-01-17 RX ORDER — ASPIRIN 81 MG/1
81 TABLET, CHEWABLE ORAL DAILY
Qty: 30 TABLET | Refills: 0 | Status: SHIPPED | OUTPATIENT
Start: 2025-01-17 | End: 2025-01-17

## 2025-01-17 RX ORDER — ISOSORBIDE MONONITRATE 60 MG/1
60 TABLET, EXTENDED RELEASE ORAL
Qty: 30 TABLET | Refills: 0 | Status: SHIPPED | OUTPATIENT
Start: 2025-01-18 | End: 2025-01-17

## 2025-01-17 RX ORDER — CLOPIDOGREL BISULFATE 75 MG/1
75 TABLET ORAL DAILY
Qty: 30 TABLET | Refills: 0 | Status: SHIPPED | OUTPATIENT
Start: 2025-01-18 | End: 2025-01-17

## 2025-01-17 RX ORDER — AMLODIPINE BESYLATE 10 MG/1
10 TABLET ORAL
Qty: 30 TABLET | Refills: 0 | Status: SHIPPED | OUTPATIENT
Start: 2025-01-17 | End: 2025-01-26

## 2025-01-17 RX ADMIN — METOPROLOL TARTRATE 50 MG: 50 TABLET, FILM COATED ORAL at 08:13

## 2025-01-17 RX ADMIN — ASPIRIN 81 MG: 81 TABLET, COATED ORAL at 08:14

## 2025-01-17 RX ADMIN — LOSARTAN POTASSIUM 50 MG: 50 TABLET, FILM COATED ORAL at 08:13

## 2025-01-17 RX ADMIN — AMLODIPINE BESYLATE 10 MG: 10 TABLET ORAL at 08:13

## 2025-01-17 RX ADMIN — ISOSORBIDE MONONITRATE 30 MG: 30 TABLET, EXTENDED RELEASE ORAL at 08:13

## 2025-01-17 RX ADMIN — SPIRONOLACTONE 50 MG: 50 TABLET ORAL at 11:18

## 2025-01-17 RX ADMIN — CLOPIDOGREL BISULFATE 75 MG: 75 TABLET ORAL at 08:13

## 2025-01-17 NOTE — DISCHARGE INSTR - AVS FIRST PAGE
Please remember to take all medications as directed on your medication list and follow-up with your primary care provider in 1-2 weeks.    You are encouraged to keep your med list on your person (in your wallet or purse) and please take your medication list provided in this After Visit Summary to your PCP visit following discharge.    Please ask your nurse to show you the medication list and explain when to take your medications.    Additionally, we encourage all patient's to take their actual medications with them to their primary care visit! This is to verify you have the proper medications and the proper dosages.  Remember, while medications are often listed in your computer record; that may not always be right as mistakes can occur at the pharmacy or in the computer and sometimes old medication bottles can be mistaken for newer medications.    (Note to nursing: please place patient's medication list on top of the AVS.)  ___________________________________________________________    Please take medications as follows:    Morning: Aspirin 81 mg, clopidogrel 75 mg, metoprolol tartrate 50 mg, isosorbide mononitrate 60 mg and spironolactone 50 mg.  Lunch: Losartan 50 mg daily.  Dinner: Metoprolol tartrate 50 mg, amlodipine 10 mg, atorvastatin 80 mg daily    Please remember your cardiology appointment on 1/29 at 1 PM; see attached information for further details on time and place

## 2025-01-17 NOTE — ASSESSMENT & PLAN NOTE
Lab Results   Component Value Date    HGBA1C 6.7 (H) 01/14/2025     Recent Labs     01/16/25  0804 01/16/25  1618 01/16/25 2122 01/17/25  0725   POCGLU 120 184* 141* 130   Needs optimization of diabetes regimen.

## 2025-01-17 NOTE — ASSESSMENT & PLAN NOTE
Noncompliant with medications  -Cholesterol 221, LDL calculated 152, HDL 43  -Plan as outlined above.

## 2025-01-17 NOTE — PROGRESS NOTES
Progress Note - Cardiology   Name: Segun Grissom 59 y.o. male I MRN: [id number]  Unit/Bed#: E4 -01 I Date of Admission: 1/14/2025   Date of Service: 1/17/2025 I Hospital Day: 2    Assessment & Plan  NSTEMI (non-ST elevated myocardial infarction) (HCC)  -Presented with right-sided chest pain and elevated troponin levels.  Initial /129.  Days prior to presentation he was nausea and dizziness   -Endorses noncompliance with all his medications for over 2 years, mainly due to lack of health insurance.  -Risk factors for CAD include uncontrolled hypertension, diabetes and hyperlipidemia  -Rise in troponin likely due to hypertensive emergency.  -Coronary artery calcification in the mid to distal LAD and left circumflex noted on chest CT.  - [facility](1/16/25):    S/P PCI with MAGALI x 1 to the Prox LAD 95% stenosis and culprit for his NSTEMI    Prox LAD lesion is 95% stenosed.    The angioplasty was pre-stent angioplasty.  - [facility] (1/15/2025):   Severe concentric left ventricular hypertrophy, normal LV function EF 68%, grade 1 diastolic dysfunction, aortic valve sclerosis and mitral valve sclerosis, no significant stenosis or regurgitation.  No left or right atrial or RV enlargement, trace tricuspid valve regurgitation, no pulmonary hypertension.  -Creatinine today 0.99 potassium 4.0 magnesium 2.1.  Peak high-sensitivity troponin was 2077.  BNP was 102 on 1/15/2025.  -Current heart medications amlodipine 10 mg daily aspirin 81 mg daily clopidogrel 75 mg daily atorvastatin 80 mg daily isosorbide mononitrate 30 mg daily metoprolol tartrate 50 mg twice daily.  -Telemetry: No significant arrhythmias identified.    TODAY (01/17/25):   Begin spironolactone 50 mg once daily.  Stable for discharge from cardiac perspective.  Discharge cardiac medications:   Morning: Aspirin 81 mg daily, clopidogrel 75 mg daily, metoprolol tartrate 50 mg, isosorbide mononitrate 60 mg and spironolactone 50 mg daily.  Lunch: Losartan 50 mg  daily.  Dinner: Metoprolol tartrate 50 mg, amlodipine 10 mg, atorvastatin 80 mg daily  Diabetes medications.    Patient needs medications from [facility] pharmacy prior to discharge.  Will arrange cardiology follow-up in office within 1 week.    Please place referral for [facility] primary care clinic at [facility] for patient to be seen within next 7 to 10 days for posthospitalization and post MI visit.  Patient needs guidance regarding applying for medical assistance.  Future referral for cardiac rehab if patient can afford.       Hypertensive emergency- Improved  -Endorses noncompliance with medication for over 2 years  -Days prior to presentation he had nausea and dizziness, in addition to right-sided chest pain with radiation to his back   -[facility] (1/14/2025): No acute aortic injury, mild bilateral groundglass opacities.  Coronary artery calcification with hepatic asteatosis.  Dyslipidemia  Noncompliant with medications  -Cholesterol 221, LDL calculated 152, HDL 43  -Plan as outlined above.  Hypertension  -Not compliant with medication   - Previously on Amlodipine 5mg, hydrochlorothiazide 12.5mg, Lisinopril 40mg QD  Asthma  Known history  Preciously on Albuterol inhaler, Montelukast 10mg, cetrizine 10mg and Advair 230-21 inhaler   Not compliant     Body mass index (BMI) 32.0-32.9, adult    Type 2 diabetes mellitus without complication, without long-term current use of insulin ([facility])  Lab Results   Component Value Date    HGBA1C 6.7 (H) 01/14/2025     Recent Labs     01/16/25  0804 01/16/25  1618 01/16/25  2122 01/17/25  0725   POCGLU 120 184* 141* 130   Needs optimization of diabetes regimen.    Subjective   Chief Complaint: Follow-up for hypertensive emergency, NSTEMI, and other comorbidities.    No acute events overnight.  Patient denies chest pain shortness of breath or dizziness.  Reports having some headache about 3 hours back.  No recurrence.  Ambulating without symptoms.    Objective :  Temp:  [97 °F (36.1  °C)-98.1 °F (36.7 °C)] 97.5 °F (36.4 °C)  HR:  [] 87  BP: (142-166)/() 162/88  Resp:  [16-21] 16  SpO2:  [94 %-98 %] 97 %  O2 Device: None (Room air)  Nasal Cannula O2 Flow Rate (L/min):  [2 L/min] 2 L/min    Vitals:    01/16/25 2144 01/16/25 2304 01/17/25 0227 01/17/25 0722   BP: 154/86 150/88 142/92 162/88   BP Location:  Left arm Right arm Left arm   Pulse: 100 83 82 87   Resp:  21 16 16   Temp:  (!) 97.4 °F (36.3 °C) 98 °F (36.7 °C) 97.5 °F (36.4 °C)   TempSrc:  Temporal Temporal Temporal   SpO2:  94% 96% 97%   Weight:       Height:         Physical Exam  Constitutional:       Appearance: He is well-developed. He is obese.   HENT:      Head: Normocephalic.   Neck:      Vascular: No JVD.   Cardiovascular:      Rate and Rhythm: Normal rate and regular rhythm.      Heart sounds: Normal heart sounds. No murmur heard.  Pulmonary:      Breath sounds: Normal breath sounds.   Musculoskeletal:      Cervical back: Neck supple.      Right lower leg: No edema.      Left lower leg: No edema.   Skin:     General: Skin is warm and dry.   Neurological:      Mental Status: He is oriented to person, place, and time.   Psychiatric:         Behavior: Behavior normal.       Telemetry reviewed: No significant arrhythmias identified.      Lab Results: I have reviewed the following results:CBC/BMP:   .     01/17/25 0458   WBC 9.03   HGB 13.1   HCT 40.2      SODIUM 139   K 4.0      CO2 26   BUN 19   CREATININE 0.99   GLUC 115   MG 2.1   PHOS 2.9      Results from last 7 days   Lab Units 01/17/25 0458 01/16/25 0542 01/15/25  0447   WBC Thousand/uL 9.03 9.47 9.02   HEMOGLOBIN g/dL 13.1 13.9 13.1   HEMATOCRIT % 40.2 42.5 39.9   PLATELETS Thousands/uL 269 265 289     Results from last 7 days   Lab Units 01/17/25 0458 01/16/25 0542 01/15/25  0447   POTASSIUM mmol/L 4.0 4.2 3.6   CHLORIDE mmol/L 107 107 107   CO2 mmol/L 26 27 24   BUN mg/dL 19 20 14   CREATININE mg/dL 0.99 0.95 0.88   CALCIUM mg/dL 8.9 9.1 8.5      Results from last 7 days   Lab Units 01/16/25  0553 01/16/25  0034 01/15/25  1748 01/15/25  1136 01/15/25  0447 01/15/25  0446 01/14/25  2238   INR   --   --   --   --  1.10  --  1.02   PTT seconds 73* 97* 59*   < >  --    < > 26    < > = values in this interval not displayed.     Lab Results   Component Value Date    HGBA1C 6.7 (H) 01/14/2025     Lab Results   Component Value Date    TROPONINI <0.01 08/05/2020       Imaging Results Review: I reviewed radiology reports from this admission including: CT chest, procedure reports, and Echocardiogram.  Other Study Results Review: EKG was reviewed.     VTE Pharmacologic Prophylaxis: Heparin

## 2025-01-17 NOTE — ASSESSMENT & PLAN NOTE
-Presented with right-sided chest pain and elevated troponin levels.  Initial /129.  Days prior to presentation he was nausea and dizziness   -Endorses noncompliance with all his medications for over 2 years, mainly due to lack of health insurance.  -Risk factors for CAD include uncontrolled hypertension, diabetes and hyperlipidemia  -Rise in troponin likely due to hypertensive emergency.  -Coronary artery calcification in the mid to distal LAD and left circumflex noted on chest CT.  - [facility](1/16/25):    S/P PCI with MAGALI x 1 to the Prox LAD 95% stenosis and culprit for his NSTEMI    Prox LAD lesion is 95% stenosed.    The angioplasty was pre-stent angioplasty.  - [facility] (1/15/2025):   Severe concentric left ventricular hypertrophy, normal LV function EF 68%, grade 1 diastolic dysfunction, aortic valve sclerosis and mitral valve sclerosis, no significant stenosis or regurgitation.  No left or right atrial or RV enlargement, trace tricuspid valve regurgitation, no pulmonary hypertension.  -Creatinine today 0.99 potassium 4.0 magnesium 2.1.  Peak high-sensitivity troponin was 2077.  BNP was 102 on 1/15/2025.  -Current heart medications amlodipine 10 mg daily aspirin 81 mg daily clopidogrel 75 mg daily atorvastatin 80 mg daily isosorbide mononitrate 30 mg daily metoprolol tartrate 50 mg twice daily.  -Telemetry: No significant arrhythmias identified.    TODAY (01/17/25):   Begin spironolactone 50 mg once daily.  Stable for discharge from cardiac perspective.  Discharge cardiac medications:   Morning: Aspirin 81 mg daily, clopidogrel 75 mg daily, metoprolol tartrate 50 mg, isosorbide mononitrate 60 mg and spironolactone 50 mg daily.  Lunch: Losartan 50 mg daily.  Dinner: Metoprolol tartrate 50 mg, amlodipine 10 mg, atorvastatin 80 mg daily  Diabetes medications.    Patient needs medications from [facility] pharmacy prior to discharge.  Will arrange cardiology follow-up in office within 1 week.    Please place referral  for [facility] primary care clinic at [facility] for patient to be seen within next 7 to 10 days for posthospitalization and post MI visit.  Patient needs guidance regarding applying for medical assistance.  Future referral for cardiac rehab if patient can afford.

## 2025-01-18 NOTE — ASSESSMENT & PLAN NOTE
Sliding scale insulin  ACHS glucose checks  Consistent carb diet  Hypo-glycemia protocol    Lab Results   Component Value Date    HGBA1C 6.7 (H) 01/14/2025       Recent Labs     01/16/25  1618 01/16/25 2122 01/17/25  0725 01/17/25  1125   POCGLU 184* 141* 130 138       Blood Sugar Average: Last 72 hrs:  (P) [id number]

## 2025-01-18 NOTE — DISCHARGE SUMMARY
Discharge Summary - Hospitalist   Name: Segun Grissom 59 y.o. male I MRN: [id number]  Unit/Bed#: E4 -01 I Date of Admission: 1/14/2025   Date of Service: 1/17/2025 I Hospital Day: 2     Assessment & Plan  NSTEMI (non-ST elevated myocardial infarction) (HCC)  Patient is 59-year-old male with past medical history of hypertension, hyperlipidemia, and asthma who presents to the hospital with chest pain intermittent for the last few days.  He has a family history of CAD.  In the emergency department his initial troponin was elevated, he was given nitro and started on heparin infusion will be admitted for further monitoring.  Cardiology consulted and appreciate recommendations  Continue heparin infusion  Troponins 212-->790-->1733-->2077-->1734-->1374  Check BNP-102  Monitor on telemetry  EKG appears nonischemic, however does show LVH  Check echocardiogram  1/15-update: As per cardiology, likely type II MI; better blood pressure control needed; continue ACS protocol for now; n.p.o. at midnight in case patient would require heart cath moving forward.    1/16-update: Patient experience further chest pain overnight, taken for left heart cath today and received LAD stent.  Please see cardiology notes for further description of coronary blockages.  Continue Lipitor 80 mg and start aspirin and Plavix.  Will need tighter blood pressure control; cardiology increase metoprolol to 50 mg twice daily, Imdur 30 mg twice a day and tomorrow start losartan 50 mg daily.  Dyslipidemia  Patient no longer takes statin  Lipid panel 221/130/43/152  Reinitiate statin therapy    Hypertension  Patient hypertensive in the emergency room, now stable with systolics in the 150s  No longer taking antihypertensives at home  Restarted on amlodipine 5 mg daily and losartan 25 mg daily  Cardiology added metoprolol 25 mg twice daily; will hold losartan tomorrow in anticipation of heart cath  Routine vital signs  Asthma  No acute  "exacerbation  Patient no longer uses an inhaler  Body mass index (BMI) 32.0-32.9, adult  Patient's BMI is 32.67  Benefit greatly from a very low carbohydrate diet (less than 50 g daily) and intermittent fasting  Triglyceride to HDL ratio about 3, A1c 6.7.  Patient has significant metabolic dysfunction  Type 2 diabetes mellitus without complication, without long-term current use of insulin (HCC)  Sliding scale insulin  ACHS glucose checks  Consistent carb diet  Hypo-glycemia protocol    Lab Results   Component Value Date    HGBA1C 6.7 (H) 01/14/2025       Recent Labs     01/16/25  1618 01/16/25  2122 01/17/25  0725 01/17/25  1125   POCGLU 184* 141* 130 138       Blood Sugar Average: Last 72 hrs:  (P) [id number]    Hypertensive emergency- Improved       Medical Problems       Resolved Problems  Date Reviewed: 5/9/2022   None       Discharging Physician / Practitioner: Galen Daley MD  PCP: Rena Maurice MD  Admission Date:   Admission Orders (From admission, onward)       Ordered        01/15/25 1350  INPATIENT ADMISSION  Once            01/14/25 2228  Place in Observation  Once                          Discharge Date: 01/17/25    Consultations During Hospital Stay:  Cardiology    Procedures Performed:   Left heart stent    Significant Findings / Test Results:   95% LAD stenosis    Incidental Findings:   Uncontrolled hypertension      Test Results Pending at Discharge (will require follow up):   None     Outpatient Tests Requested:  CBC/CMP  Lipid panel    Complications: None    Reason for Admission: Chest pain    Hospital Course:   As per HPI:    \"Segun Grissom is 59-year-old male with past medical history of hypertension, hyperlipidemia, and asthma who presents to the hospital with chest pain intermittent for the last few days.  He has a family history of CAD.  In the emergency department his initial troponin was elevated, he was given nitro and started on heparin infusion will be " "admitted for further monitoring.\"    Patient admitted for chest pain; underwent evaluation with cardiology and subsequently taken for left heart cath.  Received drug-eluting stent to proximal LAD.  Reports chest pain better and will be started on the following medication regimen:    Morning: Aspirin 81 mg, clopidogrel 75 mg, metoprolol tartrate 50 mg, isosorbide mononitrate 60 mg and spironolactone 50 mg.  Lunch: Losartan 50 mg daily.  Dinner: Metoprolol tartrate 50 mg, amlodipine 10 mg, atorvastatin 80 mg daily    All medications sent to [facility] pharmacy and paid for by the hospital.  Patient has follow-up with cardiology on 1/29/2025.      Condition at Discharge: stable    Discharge Day Visit / Exam:   * Please refer to separate progress note for these details *    Discussion with Family: Updated  (wife) at bedside.    Discharge instructions/Information to patient and family:   See after visit summary for information provided to patient and family.      Provisions for Follow-Up Care:  See after visit summary for information related to follow-up care and any pertinent home health orders.      Mobility at time of Discharge:   Basic Mobility Inpatient Raw Score: 24  JH-HLM Goal: 8: Walk 250 feet or more  JH-HLM Achieved: 8: Walk 250 feet ot more  HLM Goal achieved. Continue to encourage appropriate mobility.     Disposition:   Home    Planned Readmission: None    Discharge Medications:  See after visit summary for reconciled discharge medications provided to patient and/or family.      Administrative Statements   Discharge Statement:  I have spent a total time of 45 minutes in caring for this patient on the day of the visit/encounter. .    **Please Note: This note may have been constructed using a voice recognition system**  "

## 2025-01-20 ENCOUNTER — TELEPHONE (OUTPATIENT)
Dept: CARDIOLOGY CLINIC | Facility: CLINIC | Age: 60
End: 2025-01-20

## 2025-01-20 ENCOUNTER — OFFICE VISIT (OUTPATIENT)
Dept: FAMILY MEDICINE CLINIC | Facility: CLINIC | Age: 60
End: 2025-01-20

## 2025-01-20 VITALS
DIASTOLIC BLOOD PRESSURE: 82 MMHG | HEIGHT: 66 IN | SYSTOLIC BLOOD PRESSURE: 140 MMHG | WEIGHT: 205 LBS | OXYGEN SATURATION: 97 % | HEART RATE: 74 BPM | BODY MASS INDEX: 32.95 KG/M2 | TEMPERATURE: 99.1 F | RESPIRATION RATE: 16 BRPM

## 2025-01-20 DIAGNOSIS — J45.20 MILD INTERMITTENT ASTHMA, UNSPECIFIED WHETHER COMPLICATED: ICD-10-CM

## 2025-01-20 DIAGNOSIS — I10 HYPERTENSION, UNSPECIFIED TYPE: ICD-10-CM

## 2025-01-20 DIAGNOSIS — Z09 HOSPITAL DISCHARGE FOLLOW-UP: Primary | ICD-10-CM

## 2025-01-20 DIAGNOSIS — E11.9 TYPE 2 DIABETES MELLITUS WITHOUT COMPLICATION, WITHOUT LONG-TERM CURRENT USE OF INSULIN (HCC): ICD-10-CM

## 2025-01-20 PROBLEM — I16.1 HYPERTENSIVE EMERGENCY: Status: RESOLVED | Noted: 2025-01-15 | Resolved: 2025-01-20

## 2025-01-20 PROBLEM — S76.301A HAMSTRING INJURY, RIGHT, INITIAL ENCOUNTER: Status: RESOLVED | Noted: 2021-07-13 | Resolved: 2025-01-20

## 2025-01-20 PROBLEM — Z71.89 ENCOUNTER FOR MEDICATION REVIEW AND COUNSELING: Status: RESOLVED | Noted: 2023-03-21 | Resolved: 2025-01-20

## 2025-01-20 PROBLEM — M79.89 LEG SWELLING: Status: RESOLVED | Noted: 2022-07-29 | Resolved: 2025-01-20

## 2025-01-20 RX ORDER — FLUTICASONE PROPIONATE AND SALMETEROL XINAFOATE 230; 21 UG/1; UG/1
2 AEROSOL, METERED RESPIRATORY (INHALATION) 2 TIMES DAILY
Qty: 24 G | Refills: 3 | Status: SHIPPED | OUTPATIENT
Start: 2025-01-20

## 2025-01-20 RX ORDER — ALBUTEROL SULFATE 90 UG/1
1 INHALANT RESPIRATORY (INHALATION) EVERY 6 HOURS PRN
Qty: 8.5 G | Refills: 0 | Status: SHIPPED | OUTPATIENT
Start: 2025-01-20

## 2025-01-20 RX ORDER — METFORMIN HYDROCHLORIDE 500 MG/1
500 TABLET, EXTENDED RELEASE ORAL
Qty: 30 TABLET | Refills: 2 | Status: SHIPPED | OUTPATIENT
Start: 2025-01-20

## 2025-01-20 NOTE — ASSESSMENT & PLAN NOTE
Newly diagnosed.   While A1C is less than 7, due to recent MI, will opt for tighter control.   Start metformin xr 500 mg daily.   Discussed dietary changes to promote glycemic control.  Advised to avoid initiating exercise regimen until cleared by cardiology.   Follow up in 3 months.     Lab Results   Component Value Date    HGBA1C 6.7 (H) 01/14/2025       Orders:    metFORMIN (GLUCOPHAGE-XR) 500 mg 24 hr tablet; Take 1 tablet (500 mg total) by mouth daily with dinner

## 2025-01-20 NOTE — ASSESSMENT & PLAN NOTE
Blood pressure improved since hospital discharge.   Continue current regimen.    - spironolactone 50 mg daily   - metoprolol 50 mg BID   - losartan 50 mg daily   - isosorbide 60 mg daily   - amlodipine 10 mg daily    BP Readings from Last 3 Encounters:   01/20/25 140/82   01/17/25 153/91   03/22/23 130/80       Orders:    Blood Pressure Monitoring ([place] Choice BP Monitor/Arm) KELLY; Use daily as needed (dizziness)

## 2025-01-20 NOTE — TELEPHONE ENCOUNTER
Phone call to patient post hospital 1/17/25    Patient states he is feeling well.  No Chest pain, no swelling, no sob.  He assures me he is taking his medications as directed.  As a matter of fact, very thankful for all the care he received while in the hospital.    Reminded patient of he 1/29/25 office appointment with Lilian and reminded him to bring all his medications.

## 2025-01-20 NOTE — PROGRESS NOTES
Transition of Care Visit  Name: Segun Grissom      : 1965      MRN: [id number]  Encounter Provider: MAREN Ramirez  Encounter Date: 2025   Encounter department: [facility]    Assessment & Plan  Hospital discharge follow-up  Patient is doing well and compliant with medication regimen, recommended diet changes, and activity restrictions.   He denies any chest pain at today's visit.   His cardiac cath site on the right wrist is healing well with only a small scab at the site.   He has a follow up appointment with cardiology on 2025.  Discussed the importance of medication compliance particularly the DAPT. Patient instructed to never miss a dose or stop medication unless advised by cardiology.        Mild intermittent asthma, unspecified whether complicated  Breathing is stable. No wheezes noted on exam.   Continue Advair BID and albuterol PRN.   Refills provided.     Orders:    albuterol (PROVENTIL HFA,VENTOLIN HFA) 90 mcg/act inhaler; Inhale 1 puff every 6 (six) hours as needed for wheezing    fluticasone-salmeterol (Advair HFA) 230-21 MCG/ACT inhaler; Inhale 2 puffs 2 (two) times a day Rinse mouth after use.    Hypertension, unspecified type  Blood pressure improved since hospital discharge.   Continue current regimen.    - spironolactone 50 mg daily   - metoprolol 50 mg BID   - losartan 50 mg daily   - isosorbide 60 mg daily   - amlodipine 10 mg daily    BP Readings from Last 3 Encounters:   25 140/82   25 153/91   23 130/80       Orders:    Blood Pressure Monitoring ([place] Choice BP Monitor/Arm) KELLY; Use daily as needed (dizziness)    Type 2 diabetes mellitus without complication, without long-term current use of insulin (HCC)  Newly diagnosed.   While A1C is less than 7, due to recent MI, will opt for tighter control.   Start metformin xr 500 mg daily.   Discussed dietary changes to promote glycemic control.  Advised to avoid  initiating exercise regimen until cleared by cardiology.   Follow up in 3 months.     Lab Results   Component Value Date    HGBA1C 6.7 (H) 01/14/2025       Orders:    metFORMIN (GLUCOPHAGE-XR) 500 mg 24 hr tablet; Take 1 tablet (500 mg total) by mouth daily with dinner       History of Present Illness     Transitional Care Management Review:   Segun Grissom is a 59 y.o. male here for TCM follow up.     During the TCM phone call patient stated:  TCM Call       None          TCM Call       None          Segun Alberts is a 59 year old male with a history of HTN, HLD, asthma, T2DM. He presents to the office today for follow up after a recent hospitalization.     He was admitted to the [facility] from 01/14/2025 - 01/17/2025 for MI. He was admitted for chest pain, found to have elevated troponin. Cardiac catheterization performed with stenting of the LAD. He had not been on antihypertensive or statin therapy prior to admission due to medical non-compliance. He was discharged on amlodipine, metoprolol, ASA 81mg, atorvastatin, clopidogrel, spironolactone.     At today's visit, he reports he is feeling well and compliant with all his medications. He has no acute concerns for today's visit.         Review of Systems   Constitutional:  Negative for chills and fever.   HENT:  Negative for ear pain and sore throat.    Eyes:  Negative for pain and visual disturbance.   Respiratory:  Negative for cough and shortness of breath.    Cardiovascular:  Negative for chest pain and palpitations.   Gastrointestinal:  Negative for abdominal pain and vomiting.   Genitourinary:  Negative for dysuria and hematuria.   Musculoskeletal:  Negative for arthralgias and back pain.   Skin:  Negative for color change and rash.   Neurological:  Negative for seizures and syncope.   All other systems reviewed and are negative.    Patient Active Problem List   Diagnosis    Dyslipidemia    Hypertension    Asthma    Tinnitus of  both ears    Allergic rhinitis    Other male erectile dysfunction    Loud snoring    Rash    Chest pain    CPAP (continuous positive airway pressure) dependence    Acquired trigger finger    Arthritis    Carpal tunnel syndrome    Chronic nasal congestion    Moderate obstructive sleep apnea    Pain in right hand    Vertigo    Body mass index (BMI) 32.0-32.9, adult    NSTEMI (non-ST elevated myocardial infarction) ([facility])    Type 2 diabetes mellitus without complication, without long-term current use of insulin ([facility])     Past Medical History:   Diagnosis Date    Asthma     Hyperlipidemia     Hypertension     Sinus congestion      Current Outpatient Medications on File Prior to Visit   Medication Sig Dispense Refill    amLODIPine (NORVASC) 10 mg tablet Take 1 tablet (10 mg total) by mouth daily after dinner 30 tablet 0    aspirin 81 mg chewable tablet Chew 1 tablet (81 mg total) daily 30 tablet 0    atorvastatin (LIPITOR) 80 mg tablet Take 1 tablet (80 mg total) by mouth daily with dinner 30 tablet 0    clopidogrel (PLAVIX) 75 mg tablet Take 1 tablet (75 mg total) by mouth daily Do not start before January 18, 2025. 30 tablet 0    isosorbide mononitrate (IMDUR) 60 mg 24 hr tablet Take 1 tablet (60 mg total) by mouth Daily at 2am Do not start before January 18, 2025. 30 tablet 0    losartan (COZAAR) 50 mg tablet Take 1 tablet (50 mg total) by mouth daily after lunch 30 tablet 0    metoprolol tartrate (LOPRESSOR) 50 mg tablet Take 1 tablet (50 mg total) by mouth every 12 (twelve) hours 60 tablet 0    spironolactone (ALDACTONE) 50 mg tablet Take 1 tablet (50 mg total) by mouth daily Do not start before January 18, 2025. 30 tablet 0    [DISCONTINUED] acetaminophen (TYLENOL) 500 mg tablet Take 1,000 mg by mouth every 6 (six) hours as needed for mild pain      [DISCONTINUED] Advair -21 MCG/ACT inhaler INHALE 2 PUFFS 2 (TWO) TIMES A DAY RINSE MOUTH AFTER USE. 24 g 3    [DISCONTINUED] albuterol (PROVENTIL HFA,VENTOLIN  "HFA) 90 mcg/act inhaler INHALE 1 PUFF EVERY 6 (SIX) HOURS AS NEEDED FOR WHEEZING 8.5 g 0    [DISCONTINUED] Blood Pressure Monitoring ([place] Choice BP Monitor/Arm) KELLY Use daily as needed (dizziness) (Patient not taking: Reported on 1/20/2025) 1 each 0    [DISCONTINUED] montelukast (SINGULAIR) 10 mg tablet Take 1 tablet (10 mg total) by mouth daily at bedtime (Patient not taking: Reported on 1/20/2025) 90 tablet 3     No current facility-administered medications on file prior to visit.       Objective   /82 (BP Location: Left arm, Patient Position: Sitting, Cuff Size: Large)   Pulse 74   Temp 99.1 °F (37.3 °C) (Temporal)   Resp 16   Ht 5' 6\" (1.676 m)   Wt 93 kg (205 lb)   SpO2 97%   BMI 33.09 kg/m²     Physical Exam  Vitals and nursing note reviewed.   Constitutional:       General: He is not in acute distress.     Appearance: He is well-developed.   HENT:      Head: Normocephalic and atraumatic.   Eyes:      Conjunctiva/sclera: Conjunctivae normal.   Cardiovascular:      Rate and Rhythm: Normal rate and regular rhythm.      Heart sounds: No murmur heard.  Pulmonary:      Effort: Pulmonary effort is normal. No respiratory distress.      Breath sounds: Normal breath sounds.   Abdominal:      Palpations: Abdomen is soft.      Tenderness: There is no abdominal tenderness.   Musculoskeletal:         General: No swelling.      Cervical back: Neck supple.   Skin:     General: Skin is warm and dry.      Capillary Refill: Capillary refill takes less than 2 seconds.   Neurological:      Mental Status: He is alert.   Psychiatric:         Mood and Affect: Mood normal.       Medications have been reviewed by provider in current encounter    "

## 2025-01-20 NOTE — ASSESSMENT & PLAN NOTE
Breathing is stable. No wheezes noted on exam.   Continue Advair BID and albuterol PRN.   Refills provided.     Orders:    albuterol (PROVENTIL HFA,VENTOLIN HFA) 90 mcg/act inhaler; Inhale 1 puff every 6 (six) hours as needed for wheezing    fluticasone-salmeterol (Advair HFA) 230-21 MCG/ACT inhaler; Inhale 2 puffs 2 (two) times a day Rinse mouth after use.

## 2025-01-22 NOTE — PROGRESS NOTES
Cardiology Follow Up    [facility] [address] [address]  PHONE: [phone]  FAX: [phone]    Segun Alberts  1965  [id number]    Assessment/Plan:  Severe LVH  CAD with h/o NSTEMI treated with 1 PCI to pLAD, 1/16/25  T2DM  Obesity  HTN  Dyslipidemia  Asthma    Tolerating DAPT. He is still having angina. EKG improved compared to the last one available from the hospital.    Wife not sure he is taking Imdur. I asked her to please make sure he is.    Increase Lopressor from 25 mg q12h to 50 mg q12h.    He does not have a job and does not have medical insurance or Medicaid. Paying cash for everything. Working with an outpatient  to obtain community resources and Medicaid. I will message the  to see when/where I should send refills for [employer] program.    Was referred to cardiac rehab but at this point he cannot afford to attend.     Ideally he should not be working until his angina is under control. In this case I will write him a letter of disability for at least 1 month.    RTO in 1 month with me, Jane or Dr. Maninder Mcguire.    Interval History:   Segun Alberts is a 59 y.o. male with PMH as below who presents to the office for f/u after hospitalization at [facility] from 1/14-1/17/25 for chest pain and diagnosed with NSTEMI. Treated with cardiac cath & PCI x 1. CAD GDMT was optimized. Patient has financial barriers due to unemployment and no medical insurance.    Patient was dc to home and since then reports his wife has been helping him take his medications as prescribed.  When he walks 1-2 blocks he feels gradual increase of right-sided chest pain going under the armpit and through to the back.  Similar quality to when he was having his heart attack.  Resolves after 5 to 10 minutes of rest.  It is associated with shortness of breath but no palpitations, lightheadedness or diaphoresis.  Patient reports  some nasal congestion and mild cough since being discharged from the hospital.  He thinks he has a cold.  He also has asthma so cold air temperatures bother him.  When he is laying in bed and his nose is stuffy sometimes it makes him feel short of breath.  In this case he will also developed left-sided chest pain in bed.  Patient reiterates it is the stuffy nose which causes him to feel short of breath.  Patient denies abdominal distention or peripheral edema.  Patient denies bleeding problems on DAPT.    Past Medical History:   Diagnosis Date    Asthma     Hyperlipidemia     Hypertension     Sinus congestion       Past Surgical History:   Procedure Laterality Date    CARDIAC CATHETERIZATION Left 1/16/2025    Procedure: Cardiac Left Heart Cath;  Surgeon: Tammy Woodard DO;  Location: [facility];  Service: Cardiology    CARDIAC CATHETERIZATION N/A 1/16/2025    Procedure: Cardiac Coronary Angiogram;  Surgeon: Tammy Woodard DO;  Location: [facility];  Service: Cardiology    CARDIAC CATHETERIZATION  1/16/2025    Procedure: Left Heart Catherization;  Surgeon: Tammy Woodard DO;  Location: [facility];  Service: Cardiology    CARDIAC CATHETERIZATION N/A 1/16/2025    Procedure: Cardiac PCI;  Surgeon: Tammy Woodard DO;  Location: [facility];  Service: Cardiology    CARDIAC CATHETERIZATION N/A 1/24/2025    Procedure: Cardiac PCI;  Surgeon: Tobi Hudson MD;  Location: [facility];  Service: Cardiology    CARDIAC CATHETERIZATION N/A 1/24/2025    Procedure: Cardiac Coronary Angiogram;  Surgeon: Tobi Hudson MD;  Location: [facility];  Service: Cardiology    CARPAL TUNNEL RELEASE      right hand      Current Outpatient Medications:     albuterol (PROVENTIL HFA,VENTOLIN HFA) 90 mcg/act inhaler, Inhale 1 puff every 6 (six) hours as needed for wheezing, Disp: 8.5 g, Rfl: 5    aspirin 81 mg chewable tablet, Chew 1 tablet (81 mg total) daily, Disp: 30  "tablet, Rfl: 0    atorvastatin (LIPITOR) 80 mg tablet, Take 1 tablet (80 mg total) by mouth daily with dinner, Disp: 30 tablet, Rfl: 0    Blood Pressure Monitoring ([place] Choice BP Monitor/Arm) KELLY, Use daily as needed (dizziness), Disp: 1 each, Rfl: 0    losartan (COZAAR) 50 mg tablet, Take 1 tablet (50 mg total) by mouth daily after lunch, Disp: 30 tablet, Rfl: 0    metoprolol tartrate (LOPRESSOR) 25 mg tablet, Take 2 tablets (50 mg total) by mouth every 12 (twelve) hours, Disp: 120 tablet, Rfl: 3    spironolactone (ALDACTONE) 50 mg tablet, Take 1 tablet (50 mg total) by mouth daily Do not start before January 18, 2025., Disp: 30 tablet, Rfl: 0    ticagrelor (BRILINTA) 90 MG, Take 1 tablet (90 mg total) by mouth every 12 (twelve) hours, Disp: 60 tablet, Rfl: 0    fluticasone-salmeterol (Advair HFA) 230-21 MCG/ACT inhaler, Inhale 2 puffs 2 (two) times a day Rinse mouth after use. (Patient not taking: Reported on 1/29/2025), Disp: 24 g, Rfl: 3    isosorbide mononitrate (IMDUR) 30 mg 24 hr tablet, Take 1 tablet (30 mg total) by mouth daily (Patient not taking: Reported on 1/29/2025), Disp: 30 tablet, Rfl: 0    metFORMIN (GLUCOPHAGE-XR) 500 mg 24 hr tablet, Take 1 tablet (500 mg total) by mouth daily with dinner (Patient not taking: Reported on 1/29/2025), Disp: 30 tablet, Rfl: 2     No Known Allergies    Physical Exam:  Vitals:    01/29/25 1253   BP: 118/80   Pulse: 104     Vitals:    01/29/25 1253   Weight: 89.8 kg (198 lb)     Height: 5' 6\" (167.6 cm)   Body mass index is 31.96 kg/m².    Physical Exam  Vitals and nursing note reviewed.   Constitutional:       General: He is not in acute distress.  HENT:      Head: Normocephalic and atraumatic.      Nose: Congestion present. No rhinorrhea.      Mouth/Throat:      Mouth: Mucous membranes are moist.   Eyes:      Conjunctiva/sclera: Conjunctivae normal.   Neck:      Vascular: No carotid bruit.      Comments: - JVD or HJR  Cardiovascular:      Rate and Rhythm: Regular " rhythm. Tachycardia present.      Heart sounds: S1 normal and S2 normal. No murmur heard.  Pulmonary:      Effort: Pulmonary effort is normal.      Breath sounds: No wheezing, rhonchi or rales.      Comments: No tachypnea no coughing observed  Abdominal:      General: Abdomen is flat. There is no distension.      Tenderness: There is no abdominal tenderness.   Musculoskeletal:      Comments: No peripheral edema at all   Skin:     General: Skin is warm and dry.   Neurological:      Mental Status: He is alert and oriented to person, place, and time.   Psychiatric:         Behavior: Behavior normal.     Data:  Echo: 1/15/2025 Severe LVH. G1DD. LVEF 68%, no valvular heart disease.    Cardiac catheterization: 1/16/2025  S/P PCI with MAGALI x 1 to the Prox LAD 95% stenosis. Mid and distal LAD with moderate disease. L Cx & RCA also with moderate diffuse atherosclerotic disease.    ECG: Sinus tachycardia with heart rate 104 bpm.  Normal axis, normal intervals, poor R wave progression with nonspecific ST abnormality.  Overall ST segment changes are much improved compared to EKGs from his hospitalization.  No Q waves or T wave inversions.    Lilian Goode PA-C  [facility]

## 2025-01-24 ENCOUNTER — APPOINTMENT (EMERGENCY)
Dept: RADIOLOGY | Facility: HOSPITAL | Age: 60
DRG: 175 | End: 2025-01-24
Payer: COMMERCIAL

## 2025-01-24 ENCOUNTER — HOSPITAL ENCOUNTER (INPATIENT)
Facility: HOSPITAL | Age: 60
LOS: 2 days | Discharge: HOME | DRG: 175 | End: 2025-01-26
Attending: EMERGENCY MEDICINE | Admitting: INTERNAL MEDICINE
Payer: COMMERCIAL

## 2025-01-24 DIAGNOSIS — Z78.9 PLAVIX RESISTANCE: ICD-10-CM

## 2025-01-24 DIAGNOSIS — I21.4 NSTEMI (NON-ST ELEVATED MYOCARDIAL INFARCTION) (HCC): ICD-10-CM

## 2025-01-24 DIAGNOSIS — I21.3 STEMI (ST ELEVATION MYOCARDIAL INFARCTION) (HCC): Primary | ICD-10-CM

## 2025-01-24 PROBLEM — I25.10 CORONARY ARTERY DISEASE INVOLVING NATIVE CORONARY ARTERY OF NATIVE HEART: Status: ACTIVE | Noted: 2025-01-24

## 2025-01-24 PROBLEM — T82.867A CORONARY STENT THROMBOSIS: Status: ACTIVE | Noted: 2025-01-24

## 2025-01-24 PROBLEM — I21.02 STEMI INVOLVING LEFT ANTERIOR DESCENDING CORONARY ARTERY (HCC): Status: ACTIVE | Noted: 2025-01-15

## 2025-01-24 LAB
2HR DELTA HS TROPONIN: ABNORMAL NG/L
4HR DELTA HS TROPONIN: ABNORMAL NG/L
ANION GAP SERPL CALCULATED.3IONS-SCNC: 12 MMOL/L (ref 4–13)
APTT PPP: >210 SECONDS (ref 23–34)
ATRIAL RATE: 100 BPM
ATRIAL RATE: 109 BPM
ATRIAL RATE: 124 BPM
BASOPHILS # BLD AUTO: 0.04 THOUSANDS/ΜL (ref 0–0.1)
BASOPHILS NFR BLD AUTO: 0 % (ref 0–1)
BUN SERPL-MCNC: 24 MG/DL (ref 5–25)
CALCIUM SERPL-MCNC: 9.6 MG/DL (ref 8.4–10.2)
CARDIAC TROPONIN I PNL SERPL HS: 9 NG/L (ref ?–50)
CARDIAC TROPONIN I PNL SERPL HS: ABNORMAL NG/L (ref ?–50)
CARDIAC TROPONIN I PNL SERPL HS: ABNORMAL NG/L (ref ?–50)
CHLORIDE SERPL-SCNC: 102 MMOL/L (ref 96–108)
CO2 SERPL-SCNC: 24 MMOL/L (ref 21–32)
CREAT SERPL-MCNC: 1.15 MG/DL (ref 0.6–1.3)
EOSINOPHIL # BLD AUTO: 0.05 THOUSAND/ΜL (ref 0–0.61)
EOSINOPHIL NFR BLD AUTO: 0 % (ref 0–6)
ERYTHROCYTE [DISTWIDTH] IN BLOOD BY AUTOMATED COUNT: 14.3 % (ref 11.6–15.1)
ERYTHROCYTE [DISTWIDTH] IN BLOOD BY AUTOMATED COUNT: 14.4 % (ref 11.6–15.1)
GFR SERPL CREATININE-BSD FRML MDRD: 68 ML/MIN/1.73SQ M
GLUCOSE SERPL-MCNC: 145 MG/DL (ref 65–140)
GLUCOSE SERPL-MCNC: 214 MG/DL (ref 65–140)
HCT VFR BLD AUTO: 42.3 % (ref 36.5–49.3)
HCT VFR BLD AUTO: 44.1 % (ref 36.5–49.3)
HGB BLD-MCNC: 13.9 G/DL (ref 12–17)
HGB BLD-MCNC: 14.9 G/DL (ref 12–17)
IMM GRANULOCYTES # BLD AUTO: 0.11 THOUSAND/UL (ref 0–0.2)
IMM GRANULOCYTES NFR BLD AUTO: 1 % (ref 0–2)
INR PPP: 1.28 (ref 0.85–1.19)
KCT BLD-ACNC: 231 SEC (ref 89–137)
KCT BLD-ACNC: 306 SEC (ref 89–137)
LYMPHOCYTES # BLD AUTO: 2.15 THOUSANDS/ΜL (ref 0.6–4.47)
LYMPHOCYTES NFR BLD AUTO: 10 % (ref 14–44)
MCH RBC QN AUTO: 27.9 PG (ref 26.8–34.3)
MCH RBC QN AUTO: 28.7 PG (ref 26.8–34.3)
MCHC RBC AUTO-ENTMCNC: 32.9 G/DL (ref 31.4–37.4)
MCHC RBC AUTO-ENTMCNC: 33.8 G/DL (ref 31.4–37.4)
MCV RBC AUTO: 85 FL (ref 82–98)
MCV RBC AUTO: 85 FL (ref 82–98)
MONOCYTES # BLD AUTO: 1.12 THOUSAND/ΜL (ref 0.17–1.22)
MONOCYTES NFR BLD AUTO: 5 % (ref 4–12)
NEUTROPHILS # BLD AUTO: 19.23 THOUSANDS/ΜL (ref 1.85–7.62)
NEUTS SEG NFR BLD AUTO: 84 % (ref 43–75)
NRBC BLD AUTO-RTO: 0 /100 WBCS
P AXIS: -1 DEGREES
P AXIS: 63 DEGREES
P AXIS: 64 DEGREES
PLATELET # BLD AUTO: 297 THOUSANDS/UL (ref 149–390)
PLATELET # BLD AUTO: 335 THOUSANDS/UL (ref 149–390)
PMV BLD AUTO: 11 FL (ref 8.9–12.7)
PMV BLD AUTO: 11 FL (ref 8.9–12.7)
POTASSIUM SERPL-SCNC: 4 MMOL/L (ref 3.5–5.3)
PR INTERVAL: 132 MS
PR INTERVAL: 140 MS
PR INTERVAL: 150 MS
PROTHROMBIN TIME: 16.1 SECONDS (ref 12.3–15)
QRS AXIS: 15 DEGREES
QRS AXIS: 39 DEGREES
QRS AXIS: 70 DEGREES
QRSD INTERVAL: 140 MS
QRSD INTERVAL: 78 MS
QRSD INTERVAL: 84 MS
QT INTERVAL: 328 MS
QT INTERVAL: 352 MS
QT INTERVAL: 382 MS
QTC INTERVAL: 471 MS
QTC INTERVAL: 474 MS
QTC INTERVAL: 492 MS
RBC # BLD AUTO: 4.99 MILLION/UL (ref 3.88–5.62)
RBC # BLD AUTO: 5.19 MILLION/UL (ref 3.88–5.62)
SODIUM SERPL-SCNC: 138 MMOL/L (ref 135–147)
SPECIMEN SOURCE: ABNORMAL
SPECIMEN SOURCE: ABNORMAL
T WAVE AXIS: 41 DEGREES
T WAVE AXIS: 69 DEGREES
T WAVE AXIS: 80 DEGREES
VENTRICULAR RATE: 100 BPM
VENTRICULAR RATE: 109 BPM
VENTRICULAR RATE: 124 BPM
WBC # BLD AUTO: 22.7 THOUSAND/UL (ref 4.31–10.16)
WBC # BLD AUTO: 23.96 THOUSAND/UL (ref 4.31–10.16)

## 2025-01-24 PROCEDURE — 93005 ELECTROCARDIOGRAM TRACING: CPT

## 2025-01-24 PROCEDURE — 99291 CRITICAL CARE FIRST HOUR: CPT | Performed by: EMERGENCY MEDICINE

## 2025-01-24 PROCEDURE — C1769 GUIDE WIRE: HCPCS | Performed by: INTERNAL MEDICINE

## 2025-01-24 PROCEDURE — 027034Z DILATION OF CORONARY ARTERY, ONE ARTERY WITH DRUG-ELUTING INTRALUMINAL DEVICE, PERCUTANEOUS APPROACH: ICD-10-PCS | Performed by: INTERNAL MEDICINE

## 2025-01-24 PROCEDURE — 02C03ZZ EXTIRPATION OF MATTER FROM CORONARY ARTERY, ONE ARTERY, PERCUTANEOUS APPROACH: ICD-10-PCS | Performed by: INTERNAL MEDICINE

## 2025-01-24 PROCEDURE — 85025 COMPLETE CBC W/AUTO DIFF WBC: CPT | Performed by: EMERGENCY MEDICINE

## 2025-01-24 PROCEDURE — 93454 CORONARY ARTERY ANGIO S&I: CPT | Performed by: INTERNAL MEDICINE

## 2025-01-24 PROCEDURE — 96374 THER/PROPH/DIAG INJ IV PUSH: CPT

## 2025-01-24 PROCEDURE — 84484 ASSAY OF TROPONIN QUANT: CPT | Performed by: EMERGENCY MEDICINE

## 2025-01-24 PROCEDURE — C1760 CLOSURE DEV, VASC: HCPCS | Performed by: INTERNAL MEDICINE

## 2025-01-24 PROCEDURE — 99153 MOD SED SAME PHYS/QHP EA: CPT | Performed by: INTERNAL MEDICINE

## 2025-01-24 PROCEDURE — C1757 CATH, THROMBECTOMY/EMBOLECT: HCPCS | Performed by: INTERNAL MEDICINE

## 2025-01-24 PROCEDURE — 99232 SBSQ HOSP IP/OBS MODERATE 35: CPT | Performed by: STUDENT IN AN ORGANIZED HEALTH CARE EDUCATION/TRAINING PROGRAM

## 2025-01-24 PROCEDURE — 99285 EMERGENCY DEPT VISIT HI MDM: CPT

## 2025-01-24 PROCEDURE — 92941 PRQ TRLML REVSC TOT OCCL AMI: CPT | Performed by: INTERNAL MEDICINE

## 2025-01-24 PROCEDURE — 82948 REAGENT STRIP/BLOOD GLUCOSE: CPT

## 2025-01-24 PROCEDURE — 36415 COLL VENOUS BLD VENIPUNCTURE: CPT | Performed by: EMERGENCY MEDICINE

## 2025-01-24 PROCEDURE — B2111ZZ FLUOROSCOPY OF MULTIPLE CORONARY ARTERIES USING LOW OSMOLAR CONTRAST: ICD-10-PCS | Performed by: INTERNAL MEDICINE

## 2025-01-24 PROCEDURE — 93010 ELECTROCARDIOGRAM REPORT: CPT | Performed by: STUDENT IN AN ORGANIZED HEALTH CARE EDUCATION/TRAINING PROGRAM

## 2025-01-24 PROCEDURE — C9606 PERC D-E COR REVASC W AMI S: HCPCS | Performed by: INTERNAL MEDICINE

## 2025-01-24 PROCEDURE — 71045 X-RAY EXAM CHEST 1 VIEW: CPT

## 2025-01-24 PROCEDURE — C1887 CATHETER, GUIDING: HCPCS | Performed by: INTERNAL MEDICINE

## 2025-01-24 PROCEDURE — 85347 COAGULATION TIME ACTIVATED: CPT

## 2025-01-24 PROCEDURE — 99223 1ST HOSP IP/OBS HIGH 75: CPT | Performed by: INTERNAL MEDICINE

## 2025-01-24 PROCEDURE — 85610 PROTHROMBIN TIME: CPT | Performed by: NURSE PRACTITIONER

## 2025-01-24 PROCEDURE — 99152 MOD SED SAME PHYS/QHP 5/>YRS: CPT | Performed by: INTERNAL MEDICINE

## 2025-01-24 PROCEDURE — 85730 THROMBOPLASTIN TIME PARTIAL: CPT | Performed by: NURSE PRACTITIONER

## 2025-01-24 PROCEDURE — C1894 INTRO/SHEATH, NON-LASER: HCPCS | Performed by: INTERNAL MEDICINE

## 2025-01-24 PROCEDURE — C1874 STENT, COATED/COV W/DEL SYS: HCPCS | Performed by: INTERNAL MEDICINE

## 2025-01-24 PROCEDURE — 85027 COMPLETE CBC AUTOMATED: CPT | Performed by: NURSE PRACTITIONER

## 2025-01-24 PROCEDURE — 80048 BASIC METABOLIC PNL TOTAL CA: CPT | Performed by: EMERGENCY MEDICINE

## 2025-01-24 PROCEDURE — C1725 CATH, TRANSLUMIN NON-LASER: HCPCS | Performed by: INTERNAL MEDICINE

## 2025-01-24 DEVICE — STENT ONYXNG30034UX ONYX 3.00X34RX
Type: IMPLANTABLE DEVICE | Site: CORONARY | Status: FUNCTIONAL
Brand: ONYX FRONTIER™

## 2025-01-24 DEVICE — ANGIO-SEAL VIP VASCULAR CLOSURE DEVICE
Type: IMPLANTABLE DEVICE | Site: GROIN | Status: FUNCTIONAL
Brand: ANGIO-SEAL

## 2025-01-24 RX ORDER — HEPARIN SODIUM 1000 [USP'U]/ML
4000 INJECTION, SOLUTION INTRAVENOUS; SUBCUTANEOUS EVERY 6 HOURS PRN
Status: DISCONTINUED | OUTPATIENT
Start: 2025-01-24 | End: 2025-01-24

## 2025-01-24 RX ORDER — ASPIRIN 81 MG/1
81 TABLET ORAL DAILY
Status: DISCONTINUED | OUTPATIENT
Start: 2025-01-24 | End: 2025-01-24

## 2025-01-24 RX ORDER — MORPHINE SULFATE 4 MG/ML
4 INJECTION, SOLUTION INTRAMUSCULAR; INTRAVENOUS ONCE
Status: COMPLETED | OUTPATIENT
Start: 2025-01-24 | End: 2025-01-24

## 2025-01-24 RX ORDER — INSULIN LISPRO 100 [IU]/ML
1-6 INJECTION, SOLUTION INTRAVENOUS; SUBCUTANEOUS
Status: DISCONTINUED | OUTPATIENT
Start: 2025-01-25 | End: 2025-01-26 | Stop reason: HOSPADM

## 2025-01-24 RX ORDER — HEPARIN SODIUM 1000 [USP'U]/ML
4000 INJECTION, SOLUTION INTRAVENOUS; SUBCUTANEOUS ONCE
Status: COMPLETED | OUTPATIENT
Start: 2025-01-24 | End: 2025-01-24

## 2025-01-24 RX ORDER — METOPROLOL TARTRATE 50 MG
50 TABLET ORAL EVERY 12 HOURS SCHEDULED
Status: CANCELLED | OUTPATIENT
Start: 2025-01-24

## 2025-01-24 RX ORDER — METOPROLOL TARTRATE 1 MG/ML
INJECTION, SOLUTION INTRAVENOUS CODE/TRAUMA/SEDATION MEDICATION
Status: DISCONTINUED | OUTPATIENT
Start: 2025-01-24 | End: 2025-01-24 | Stop reason: HOSPADM

## 2025-01-24 RX ORDER — ENOXAPARIN SODIUM 100 MG/ML
40 INJECTION SUBCUTANEOUS DAILY
Status: DISCONTINUED | OUTPATIENT
Start: 2025-01-25 | End: 2025-01-26 | Stop reason: HOSPADM

## 2025-01-24 RX ORDER — ATORVASTATIN CALCIUM 80 MG/1
80 TABLET, FILM COATED ORAL
Status: DISCONTINUED | OUTPATIENT
Start: 2025-01-24 | End: 2025-01-26 | Stop reason: HOSPADM

## 2025-01-24 RX ORDER — ASPIRIN 81 MG/1
81 TABLET, CHEWABLE ORAL DAILY
Status: CANCELLED | OUTPATIENT
Start: 2025-01-24

## 2025-01-24 RX ORDER — LIDOCAINE 50 MG/G
1 PATCH TOPICAL DAILY
Status: DISCONTINUED | OUTPATIENT
Start: 2025-01-24 | End: 2025-01-26 | Stop reason: HOSPADM

## 2025-01-24 RX ORDER — METOPROLOL TARTRATE 25 MG/1
25 TABLET, FILM COATED ORAL EVERY 12 HOURS SCHEDULED
Status: DISCONTINUED | OUTPATIENT
Start: 2025-01-24 | End: 2025-01-26 | Stop reason: HOSPADM

## 2025-01-24 RX ORDER — NITROGLYCERIN 0.4 MG/1
TABLET SUBLINGUAL CODE/TRAUMA/SEDATION MEDICATION
Status: DISCONTINUED | OUTPATIENT
Start: 2025-01-24 | End: 2025-01-24

## 2025-01-24 RX ORDER — HEPARIN SODIUM 10000 [USP'U]/100ML
3-20 INJECTION, SOLUTION INTRAVENOUS
Status: DISCONTINUED | OUTPATIENT
Start: 2025-01-24 | End: 2025-01-24

## 2025-01-24 RX ORDER — HEPARIN SODIUM 1000 [USP'U]/ML
2000 INJECTION, SOLUTION INTRAVENOUS; SUBCUTANEOUS EVERY 6 HOURS PRN
Status: DISCONTINUED | OUTPATIENT
Start: 2025-01-24 | End: 2025-01-24

## 2025-01-24 RX ORDER — ASPIRIN 81 MG/1
81 TABLET ORAL DAILY
Status: DISCONTINUED | OUTPATIENT
Start: 2025-01-25 | End: 2025-01-26 | Stop reason: HOSPADM

## 2025-01-24 RX ORDER — CHLORHEXIDINE GLUCONATE ORAL RINSE 1.2 MG/ML
15 SOLUTION DENTAL EVERY 12 HOURS SCHEDULED
Status: DISCONTINUED | OUTPATIENT
Start: 2025-01-24 | End: 2025-01-25

## 2025-01-24 RX ORDER — ATORVASTATIN CALCIUM 80 MG/1
80 TABLET, FILM COATED ORAL
Status: CANCELLED | OUTPATIENT
Start: 2025-01-24

## 2025-01-24 RX ORDER — MIDAZOLAM HYDROCHLORIDE 2 MG/2ML
INJECTION, SOLUTION INTRAMUSCULAR; INTRAVENOUS CODE/TRAUMA/SEDATION MEDICATION
Status: DISCONTINUED | OUTPATIENT
Start: 2025-01-24 | End: 2025-01-24 | Stop reason: HOSPADM

## 2025-01-24 RX ORDER — HEPARIN SODIUM 1000 [USP'U]/ML
INJECTION, SOLUTION INTRAVENOUS; SUBCUTANEOUS CODE/TRAUMA/SEDATION MEDICATION
Status: DISCONTINUED | OUTPATIENT
Start: 2025-01-24 | End: 2025-01-24 | Stop reason: HOSPADM

## 2025-01-24 RX ORDER — SODIUM CHLORIDE 9 MG/ML
100 INJECTION, SOLUTION INTRAVENOUS CONTINUOUS
Status: CANCELLED | OUTPATIENT
Start: 2025-01-24 | End: 2025-01-24

## 2025-01-24 RX ORDER — ISOSORBIDE MONONITRATE 30 MG/1
30 TABLET, EXTENDED RELEASE ORAL DAILY
Status: DISCONTINUED | OUTPATIENT
Start: 2025-01-24 | End: 2025-01-26 | Stop reason: HOSPADM

## 2025-01-24 RX ORDER — FENTANYL CITRATE 50 UG/ML
INJECTION, SOLUTION INTRAMUSCULAR; INTRAVENOUS CODE/TRAUMA/SEDATION MEDICATION
Status: DISCONTINUED | OUTPATIENT
Start: 2025-01-24 | End: 2025-01-24 | Stop reason: HOSPADM

## 2025-01-24 RX ORDER — SODIUM CHLORIDE 9 MG/ML
3 INJECTION INTRAVENOUS
Status: DISCONTINUED | OUTPATIENT
Start: 2025-01-24 | End: 2025-01-26 | Stop reason: HOSPADM

## 2025-01-24 RX ORDER — CLOPIDOGREL 300 MG/1
600 TABLET, FILM COATED ORAL ONCE
Status: DISCONTINUED | OUTPATIENT
Start: 2025-01-24 | End: 2025-01-24

## 2025-01-24 RX ADMIN — MORPHINE SULFATE 2 MG: 2 INJECTION, SOLUTION INTRAMUSCULAR; INTRAVENOUS at 09:34

## 2025-01-24 RX ADMIN — ATORVASTATIN CALCIUM 80 MG: 80 TABLET, FILM COATED ORAL at 19:43

## 2025-01-24 RX ADMIN — NITROGLYCERIN 0.4 MG: 0.4 TABLET SUBLINGUAL at 06:45

## 2025-01-24 RX ADMIN — MORPHINE SULFATE 4 MG: 4 INJECTION INTRAVENOUS at 06:47

## 2025-01-24 RX ADMIN — MORPHINE SULFATE 2 MG: 2 INJECTION, SOLUTION INTRAMUSCULAR; INTRAVENOUS at 19:40

## 2025-01-24 RX ADMIN — HEPARIN SODIUM 4000 UNITS: 1000 INJECTION, SOLUTION INTRAVENOUS; SUBCUTANEOUS at 09:25

## 2025-01-24 RX ADMIN — SODIUM CHLORIDE 500 ML: 0.9 INJECTION, SOLUTION INTRAVENOUS at 06:56

## 2025-01-24 RX ADMIN — ASPIRIN 81 MG: 81 TABLET, COATED ORAL at 11:28

## 2025-01-24 RX ADMIN — MORPHINE SULFATE 2 MG: 2 INJECTION, SOLUTION INTRAMUSCULAR; INTRAVENOUS at 16:39

## 2025-01-24 RX ADMIN — HEPARIN SODIUM 11.8 UNITS/KG/HR: 10000 INJECTION, SOLUTION INTRAVENOUS at 09:28

## 2025-01-24 RX ADMIN — LIDOCAINE 1 PATCH: 50 PATCH TOPICAL at 13:31

## 2025-01-24 RX ADMIN — METOPROLOL TARTRATE 25 MG: 25 TABLET, FILM COATED ORAL at 21:20

## 2025-01-24 RX ADMIN — ISOSORBIDE MONONITRATE 30 MG: 30 TABLET, EXTENDED RELEASE ORAL at 15:45

## 2025-01-24 RX ADMIN — CHLORHEXIDINE GLUCONATE 15 ML: 1.2 SOLUTION ORAL at 21:20

## 2025-01-24 RX ADMIN — HEPARIN SODIUM 4000 UNITS: 1000 INJECTION INTRAVENOUS; SUBCUTANEOUS at 06:59

## 2025-01-24 RX ADMIN — NITROGLYCERIN 0.4 MG: 0.4 TABLET SUBLINGUAL at 06:36

## 2025-01-24 RX ADMIN — TICAGRELOR 180 MG: 90 TABLET ORAL at 06:52

## 2025-01-24 NOTE — PLAN OF CARE
Problem: Prexisting or High Potential for Compromised Skin Integrity  Goal: Skin integrity is maintained or improved  Description: INTERVENTIONS:  - Identify patients at risk for skin breakdown  - Assess and monitor skin integrity  - Assess and monitor nutrition and hydration status  - Monitor labs   - Assess for incontinence   - Turn and reposition patient  - Assist with mobility/ambulation  - Relieve pressure over bony prominences  - Avoid friction and shearing  - Provide appropriate hygiene as needed including keeping skin clean and dry  - Evaluate need for skin moisturizer/barrier cream  - Collaborate with interdisciplinary team   - Patient/family teaching  - Consider wound care consult   Outcome: Progressing     Problem: Nutrition/Hydration-ADULT  Goal: Nutrient/Hydration intake appropriate for improving, restoring or maintaining nutritional needs  Description: Monitor and assess patient's nutrition/hydration status for malnutrition. Collaborate with interdisciplinary team and initiate plan and interventions as ordered.  Monitor patient's weight and dietary intake as ordered or per policy. Utilize nutrition screening tool and intervene as necessary. Determine patient's food preferences and provide high-protein, high-caloric foods as appropriate.     INTERVENTIONS:  - Monitor oral intake, urinary output, labs, and treatment plans  - Assess nutrition and hydration status and recommend course of action  - Evaluate amount of meals eaten  - Assist patient with eating if necessary   - Allow adequate time for meals  - Recommend/ encourage appropriate diets, oral nutritional supplements, and vitamin/mineral supplements  - Order, calculate, and assess calorie counts as needed  - Recommend, monitor, and adjust tube feedings and TPN/PPN based on assessed needs  - Assess need for intravenous fluids  - Provide specific nutrition/hydration education as appropriate  - Include patient/family/caregiver in decisions related to  nutrition  Outcome: Progressing     Problem: PAIN - ADULT  Goal: Verbalizes/displays adequate comfort level or baseline comfort level  Description: Interventions:  - Encourage patient to monitor pain and request assistance  - Assess pain using appropriate pain scale  - Administer analgesics based on type and severity of pain and evaluate response  - Implement non-pharmacological measures as appropriate and evaluate response  - Consider cultural and social influences on pain and pain management  - Notify physician/advanced practitioner if interventions unsuccessful or patient reports new pain  Outcome: Progressing     Problem: INFECTION - ADULT  Goal: Absence or prevention of progression during hospitalization  Description: INTERVENTIONS:  - Assess and monitor for signs and symptoms of infection  - Monitor lab/diagnostic results  - Monitor all insertion sites, i.e. indwelling lines, tubes, and drains  - Monitor endotracheal if appropriate and nasal secretions for changes in amount and color  - [place] appropriate cooling/warming therapies per order  - Administer medications as ordered  - Instruct and encourage patient and family to use good hand hygiene technique  - Identify and instruct in appropriate isolation precautions for identified infection/condition  Outcome: Progressing  Goal: Absence of fever/infection during neutropenic period  Description: INTERVENTIONS:  - Monitor WBC    Outcome: Progressing     Problem: SAFETY ADULT  Goal: Patient will remain free of falls  Description: INTERVENTIONS:  - Educate patient/family on patient safety including physical limitations  - Instruct patient to call for assistance with activity   - Consult OT/PT to assist with strengthening/mobility   - Keep Call bell within reach  - Keep bed low and locked with side rails adjusted as appropriate  - Keep care items and personal belongings within reach  - Initiate and maintain comfort rounds  - Make Fall Risk Sign visible to  staff  - Apply yellow socks and bracelet for high fall risk patients  - Consider moving patient to room near nurses station  Outcome: Progressing  Goal: Maintain or return to baseline ADL function  Description: INTERVENTIONS:  -  Assess patient's ability to carry out ADLs; assess patient's baseline for ADL function and identify physical deficits which impact ability to perform ADLs (bathing, care of mouth/teeth, toileting, grooming, dressing, etc.)  - Assess/evaluate cause of self-care deficits   - Assess range of motion  - Assess patient's mobility; develop plan if impaired  - Assess patient's need for assistive devices and provide as appropriate  - Encourage maximum independence but intervene and supervise when necessary  - Involve family in performance of ADLs  - Assess for home care needs following discharge   - Consider OT consult to assist with ADL evaluation and planning for discharge  - Provide patient education as appropriate  Outcome: Progressing  Goal: Maintains/Returns to pre admission functional level  Description: INTERVENTIONS:  - Perform AM-PAC 6 Click Basic Mobility/ Daily Activity assessment daily.  - Set and communicate daily mobility goal to care team and patient/family/caregiver.   - Collaborate with rehabilitation services on mobility goals if consulted  - Out of bed for toileting  - Record patient progress and toleration of activity level   Outcome: Progressing     Problem: DISCHARGE PLANNING  Goal: Discharge to home or other facility with appropriate resources  Description: INTERVENTIONS:  - Identify barriers to discharge w/patient and caregiver  - Arrange for needed discharge resources and transportation as appropriate  - Identify discharge learning needs (meds, wound care, etc.)  - Arrange for interpretive services to assist at discharge as needed  - Refer to Case Management Department for coordinating discharge planning if the patient needs post-hospital services based on  physician/advanced practitioner order or complex needs related to functional status, cognitive ability, or social support system  Outcome: Progressing     Problem: Knowledge Deficit  Goal: Patient/family/caregiver demonstrates understanding of disease process, treatment plan, medications, and discharge instructions  Description: Complete learning assessment and assess knowledge base.  Interventions:  - Provide teaching at level of understanding  - Provide teaching via preferred learning methods  Outcome: Progressing     Problem: DISCHARGE PLANNING - CARE MANAGEMENT  Goal: Discharge to post-acute care or home with appropriate resources  Description: INTERVENTIONS:  - Conduct assessment to determine patient/family and health care team treatment goals, and need for post-acute services based on payer coverage, community resources, and patient preferences, and barriers to discharge  - Address psychosocial, clinical, and financial barriers to discharge as identified in assessment in conjunction with the patient/family and health care team  - Arrange appropriate level of post-acute services according to patient’s   needs and preference and payer coverage in collaboration with the physician and health care team  - Communicate with and update the patient/family, physician, and health care team regarding progress on the discharge plan  - Arrange appropriate transportation to post-acute venues  Outcome: Progressing

## 2025-01-24 NOTE — ASSESSMENT & PLAN NOTE
- Presented to hospital with STEMI, 8 days after undergoing PCI to LAD. Noted to have stent-thrombosis   - Chest pain started the night prior to presentation at 2200. ECG suggestive of STEMI   - [facility](1/24/25): RFA access, 100 % occlusion on proximal LAD stent with thrombus. Successful PCI with thrombectomy and placement of a 3.0 x 34 mm MAGALI post dilated to 3.5 mm  - Currently on Brilinta and aspirin

## 2025-01-24 NOTE — ED PROVIDER NOTES
Time reflects when diagnosis was documented in both MDM as applicable and the Disposition within this note       Time User Action Codes Description Comment    1/24/2025  6:56 AM Mac Kaur Add [I21.3] STEMI (ST elevation myocardial infarction) (HCC)           ED Disposition       ED Disposition   Send to Cath Lab    Condition   --    Date/Time   Fri Jan 24, 2025  6:49 AM    Comment   --             Assessment & Plan       Medical Decision Making  Amount and/or Complexity of Data Reviewed  Labs: ordered.     Details: Trop #1 9,   Radiology: ordered and independent interpretation performed.     Details: Cardiolmegaly, pulmonary congestion    Risk  Prescription drug management.  Decision regarding hospitalization.        ED Course as of 01/24/25 0935   Fri Jan 24, 2025   0653 D/W Dr. Hudson, who reviewed EKG sent through secure chat, and he agrees STEMI pattern       Medications   sodium chloride (PF) 0.9 % injection 3 mL (has no administration in time range)   morphine injection 4 mg (4 mg Intravenous Given 1/24/25 0647)   ticagrelor (BRILINTA) tablet 180 mg (180 mg Oral Given 1/24/25 0652)   heparin (porcine) injection 4,000 Units (4,000 Units Intravenous Given 1/24/25 0659)   sodium chloride 0.9 % bolus 500 mL (500 mL Intravenous New Bag 1/24/25 0656)       ED Risk Strat Scores                          SBIRT 22yo+      Flowsheet Row Most Recent Value   Initial Alcohol Screen: US AUDIT-C     1. How often do you have a drink containing alcohol? 0 Filed at: 01/24/2025 0629   2. How many drinks containing alcohol do you have on a typical day you are drinking?  0 Filed at: 01/24/2025 0629   3a. Male UNDER 65: How often do you have five or more drinks on one occasion? 0 Filed at: 01/24/2025 0629   Audit-C Score 0 Filed at: 01/24/2025 0629   ALONZO: How many times in the past year have you...    Used an illegal drug or used a prescription medication for non-medical reasons? Never Filed at: 01/24/2025 0629                             History of Present Illness       Chief Complaint   Patient presents with    Chest Pain     Patient began experiencing chest pain about 2200 last evening. States it is a pressure, with pain and weakness in his left arm. Patient was recently in the hospital two weeks ago for an MI and stent placement. Patient received 2 nitro and 324 of ASA from EMS.        Past Medical History:   Diagnosis Date    Asthma     Hyperlipidemia     Hypertension     Sinus congestion       Past Surgical History:   Procedure Laterality Date    CARDIAC CATHETERIZATION Left 1/16/2025    Procedure: Cardiac Left Heart Cath;  Surgeon: Tammy Woodard DO;  Location: [facility];  Service: Cardiology    CARDIAC CATHETERIZATION N/A 1/16/2025    Procedure: Cardiac Coronary Angiogram;  Surgeon: Tammy Woodard DO;  Location: [facility];  Service: Cardiology    CARDIAC CATHETERIZATION  1/16/2025    Procedure: Left Heart Catherization;  Surgeon: Tammy Woodard DO;  Location: [facility];  Service: Cardiology    CARDIAC CATHETERIZATION N/A 1/16/2025    Procedure: Cardiac PCI;  Surgeon: Tammy Woodard DO;  Location: [facility];  Service: Cardiology    CARPAL TUNNEL RELEASE      right hand      No family history on file.   Social History     Tobacco Use    Smoking status: Never    Smokeless tobacco: Never   Vaping Use    Vaping status: Never Used   Substance Use Topics    Alcohol use: Never    Drug use: Never      E-Cigarette/Vaping    E-Cigarette Use Never User       E-Cigarette/Vaping Substances      I have reviewed and agree with the history as documented.     60 yo M brought by EMS with call ahead concern for prehospital MI by transmitted EKG reviewed by me, c/o chest pain since last night, associated with nausea, sweating, and shortness of breath, given NTG PTA, aspirin.  On arrival, patient assessed at baseline.  EKG reviewed at bedside, c/w STEMI in leads 2,3,AVF, V2V6, and  depressions V5, V6, level 2 STEMI alert called through PAC, medications given by protocol    He admitted here 1/14-1/17 for elevated troponin, S/P LHC with MAGALI placement.  He was started on beta blocker, lipid treatment, antiplatelet.       History provided by:  Patient  Chest Pain      Review of Systems   Cardiovascular:  Positive for chest pain.           Objective       ED Triage Vitals   Temperature Pulse Blood Pressure Respirations SpO2 Patient Position - Orthostatic VS   01/24/25 0630 01/24/25 0630 01/24/25 0630 01/24/25 0630 01/24/25 0630 01/24/25 0630   98.2 °F (36.8 °C) (!) 124 135/87 20 95 % Sitting      Temp Source Heart Rate Source BP Location FiO2 (%) Pain Score    01/24/25 0838 01/24/25 0630 01/24/25 0630 -- 01/24/25 0647    Oral Monitor Left arm  9      Vitals      Date and Time Temp Pulse SpO2 Resp BP Pain Score FACES Pain Rating User   01/24/25 0934 -- -- -- -- -- 10 - Worst Possible Pain -- CM   01/24/25 0838 97.7 °F (36.5 °C) 100 97 % 23 136/85 -- -- CM   01/24/25 0808 -- -- -- -- -- 5 -- KT   01/24/25 0716 -- -- 100 % -- -- -- -- KT   01/24/25 0700 -- 117 97 % 16 143/85 -- -- YC   01/24/25 0650 -- 128 98 % 23 128/73 -- -- AA   01/24/25 0647 -- -- -- -- -- 9 -- AA   01/24/25 0640 -- 121 95 % 20 141/84 -- -- AA   01/24/25 0635 -- 120 95 % 23 150/86 -- -- AA   01/24/25 0630 98.2 °F (36.8 °C) 124 95 % 20 135/87 -- -- AA            Physical Exam  Vitals and nursing note reviewed.   Constitutional:       General: He is in acute distress.      Appearance: He is well-developed.   HENT:      Head: Normocephalic and atraumatic.      Right Ear: External ear normal.      Left Ear: External ear normal.      Nose: Nose normal.      Mouth/Throat:      Mouth: Mucous membranes are moist.   Eyes:      Conjunctiva/sclera: Conjunctivae normal.      Pupils: Pupils are equal, round, and reactive to light.   Cardiovascular:      Rate and Rhythm: Normal rate.   Pulmonary:      Effort: Pulmonary effort is normal.    Abdominal:      Tenderness: There is no abdominal tenderness.   Musculoskeletal:         General: Normal range of motion.      Cervical back: Normal range of motion and neck supple.   Skin:     General: Skin is warm and dry.      Capillary Refill: Capillary refill takes less than 2 seconds.   Neurological:      Mental Status: He is alert and oriented to person, place, and time.      Cranial Nerves: No cranial nerve deficit.      Coordination: Coordination normal.   Psychiatric:         Behavior: Behavior normal.         Thought Content: Thought content normal.         Judgment: Judgment normal.         Results Reviewed       Procedure Component Value Units Date/Time    HS Troponin 0hr (reflex protocol) [[id number]]  (Normal) Collected: 01/24/25 0646    Lab Status: Final result Specimen: Blood from Arm, Right Updated: 01/24/25 0715     hs TnI 0hr 9 ng/L     Basic metabolic panel [[id number]]  (Abnormal) Collected: 01/24/25 0646    Lab Status: Final result Specimen: Blood from Arm, Right Updated: 01/24/25 0708     Sodium 138 mmol/L      Potassium 4.0 mmol/L      Chloride 102 mmol/L      CO2 24 mmol/L      ANION GAP 12 mmol/L      BUN 24 mg/dL      Creatinine 1.15 mg/dL      Glucose 214 mg/dL      Calcium 9.6 mg/dL      eGFR 68 ml/min/1.73sq m     Narrative:      National Kid[employer] Disease Foundation guidelines for Chronic Kidney Disease (CKD):     Stage 1 with normal or high GFR (GFR > 90 mL/min/1.73 square meters)    Stage 2 Mild CKD (GFR = 60-89 mL/min/1.73 square meters)    Stage 3A Moderate CKD (GFR = 45-59 mL/min/1.73 square meters)    Stage 3B Moderate CKD (GFR = 30-44 mL/min/1.73 square meters)    Stage 4 Severe CKD (GFR = 15-29 mL/min/1.73 square meters)    Stage 5 End Stage CKD (GFR <15 mL/min/1.73 square meters)  Note: GFR calculation is accurate only with a steady state creatinine    CBC and differential [[id number]]  (Abnormal) Collected: 01/24/25 0646    Lab Status: Final result Specimen: Blood from Arm,  Right Updated: 01/24/25 0651     WBC 22.70 Thousand/uL      RBC 5.19 Million/uL      Hemoglobin 14.9 g/dL      Hematocrit 44.1 %      MCV 85 fL      MCH 28.7 pg      MCHC 33.8 g/dL      RDW 14.4 %      MPV 11.0 fL      Platelets 335 Thousands/uL      nRBC 0 /100 WBCs      Segmented % 84 %      Immature Grans % 1 %      Lymphocytes % 10 %      Monocytes % 5 %      Eosinophils Relative 0 %      Basophils Relative 0 %      Absolute Neutrophils 19.23 Thousands/µL      Absolute Immature Grans 0.11 Thousand/uL      Absolute Lymphocytes 2.15 Thousands/µL      Absolute Monocytes 1.12 Thousand/µL      Eosinophils Absolute 0.05 Thousand/µL      Basophils Absolute 0.04 Thousands/µL             X-ray chest 1 view portable   Final Interpretation by Av Chang MD (01/24 0823)      No acute cardiopulmonary disease.            Workstation performed: [id number]             ECG 12 Lead Documentation Only    Date/Time: 1/24/2025 6:37 AM    Performed by: Mac Kaur MD  Authorized by: Mac Karu MD    Rate:     ECG rate:  124  Rhythm:     Rhythm: sinus tachycardia    Ectopy:     Ectopy: none    ST segments:     ST segments:  Abnormal    Elevation:  III, II, aVF and V2    Depression:  V5 and V6  CriticalCare Time    Date/Time: 1/24/2025 7:54 AM    Performed by: Mac Kaur MD  Authorized by: Mac Kaur MD    Critical care provider statement:     Critical care time (minutes):  30    Critical care time was exclusive of:  Separately billable procedures and treating other patients and teaching time    Critical care was necessary to treat or prevent imminent or life-threatening deterioration of the following conditions:  Cardiac failure    Critical care was time spent personally by me on the following activities:  Blood draw for specimens, obtaining history from patient or surrogate, development of treatment plan with patient or surrogate, discussions with consultants, evaluation of patient's  response to treatment, examination of patient, interpretation of cardiac output measurements, ordering and performing treatments and interventions, ordering and review of laboratory studies, ordering and review of radiographic studies, re-evaluation of patient's condition and review of old charts    I assumed direction of critical care for this patient from another provider in my specialty: no        ED Medication and Procedure Management   Prior to Admission Medications   Prescriptions Last Dose Informant Patient Reported? Taking?   Blood Pressure Monitoring ([place] Choice BP Monitor/Arm) KELLY   No No   Sig: Use daily as needed (dizziness)   albuterol (PROVENTIL HFA,VENTOLIN HFA) 90 mcg/act inhaler   No No   Sig: Inhale 1 puff every 6 (six) hours as needed for wheezing   amLODIPine (NORVASC) 10 mg tablet   No No   Sig: Take 1 tablet (10 mg total) by mouth daily after dinner   aspirin 81 mg chewable tablet   No No   Sig: Chew 1 tablet (81 mg total) daily   atorvastatin (LIPITOR) 80 mg tablet   No No   Sig: Take 1 tablet (80 mg total) by mouth daily with dinner   clopidogrel (PLAVIX) 75 mg tablet   No No   Sig: Take 1 tablet (75 mg total) by mouth daily Do not start before January 18, 2025.   fluticasone-salmeterol (Advair HFA) 230-21 MCG/ACT inhaler   No No   Sig: Inhale 2 puffs 2 (two) times a day Rinse mouth after use.   isosorbide mononitrate (IMDUR) 60 mg 24 hr tablet   No No   Sig: Take 1 tablet (60 mg total) by mouth Daily at 2am Do not start before January 18, 2025.   losartan (COZAAR) 50 mg tablet   No No   Sig: Take 1 tablet (50 mg total) by mouth daily after lunch   metFORMIN (GLUCOPHAGE-XR) 500 mg 24 hr tablet   No No   Sig: Take 1 tablet (500 mg total) by mouth daily with dinner   metoprolol tartrate (LOPRESSOR) 50 mg tablet   No No   Sig: Take 1 tablet (50 mg total) by mouth every 12 (twelve) hours   spironolactone (ALDACTONE) 50 mg tablet   No No   Sig: Take 1 tablet (50 mg total) by mouth daily Do not  start before January 18, 2025.      Facility-Administered Medications: None     Current Discharge Medication List        CONTINUE these medications which have NOT CHANGED    Details   albuterol (PROVENTIL HFA,VENTOLIN HFA) 90 mcg/act inhaler Inhale 1 puff every 6 (six) hours as needed for wheezing  Qty: 8.5 g, Refills: 0    Comments: Substitution to a formulary equivalent within the same pharmaceutical class is authorized.  Associated Diagnoses: Mild intermittent asthma, unspecified whether complicated      amLODIPine (NORVASC) 10 mg tablet Take 1 tablet (10 mg total) by mouth daily after dinner  Qty: 30 tablet, Refills: 0    Associated Diagnoses: NSTEMI (non-ST elevated myocardial infarction) ([facility])      aspirin 81 mg chewable tablet Chew 1 tablet (81 mg total) daily  Qty: 30 tablet, Refills: 0    Associated Diagnoses: NSTEMI (non-ST elevated myocardial infarction) ([facility])      atorvastatin (LIPITOR) 80 mg tablet Take 1 tablet (80 mg total) by mouth daily with dinner  Qty: 30 tablet, Refills: 0    Associated Diagnoses: NSTEMI (non-ST elevated myocardial infarction) ([facility])      Blood Pressure Monitoring ([place] Choice BP Monitor/Arm) KELLY Use daily as needed (dizziness)  Qty: 1 each, Refills: 0    Associated Diagnoses: Hypertension, unspecified type      clopidogrel (PLAVIX) 75 mg tablet Take 1 tablet (75 mg total) by mouth daily Do not start before January 18, 2025.  Qty: 30 tablet, Refills: 0    Associated Diagnoses: NSTEMI (non-ST elevated myocardial infarction) ([facility])      fluticasone-salmeterol (Advair HFA) 230-21 MCG/ACT inhaler Inhale 2 puffs 2 (two) times a day Rinse mouth after use.  Qty: 24 g, Refills: 3    Comments: Substitution to a formulary equivalent within the same pharmaceutical class is authorized.  Associated Diagnoses: Mild intermittent asthma, unspecified whether complicated      isosorbide mononitrate (IMDUR) 60 mg 24 hr tablet Take 1 tablet (60 mg total) by mouth Daily at 2am Do not start before  January 18, 2025.  Qty: 30 tablet, Refills: 0    Associated Diagnoses: NSTEMI (non-ST elevated myocardial infarction) ([facility])      losartan (COZAAR) 50 mg tablet Take 1 tablet (50 mg total) by mouth daily after lunch  Qty: 30 tablet, Refills: 0    Associated Diagnoses: NSTEMI (non-ST elevated myocardial infarction) ([facility])      metFORMIN (GLUCOPHAGE-XR) 500 mg 24 hr tablet Take 1 tablet (500 mg total) by mouth daily with dinner  Qty: 30 tablet, Refills: 2    Associated Diagnoses: Type 2 diabetes mellitus without complication, without long-term current use of insulin (HCC)      metoprolol tartrate (LOPRESSOR) 50 mg tablet Take 1 tablet (50 mg total) by mouth every 12 (twelve) hours  Qty: 60 tablet, Refills: 0    Associated Diagnoses: NSTEMI (non-ST elevated myocardial infarction) ([facility])      spironolactone (ALDACTONE) 50 mg tablet Take 1 tablet (50 mg total) by mouth daily Do not start before January 18, 2025.  Qty: 30 tablet, Refills: 0    Associated Diagnoses: NSTEMI (non-ST elevated myocardial infarction) ([facility])           No discharge procedures on file.  ED SEPSIS DOCUMENTATION   Time reflects when diagnosis was documented in both MDM as applicable and the Disposition within this note       Time User Action Codes Description Comment    1/24/2025  6:56 AM Mac Kaur Add [I21.3] STEMI (ST elevation myocardial infarction) ([facility])                  Mac Kaur MD  01/24/25 0954

## 2025-01-24 NOTE — BRIEF OP NOTE (RAD/CATH)
Emergent cardiac cath done in setting of acute CP beginning about 10 pm 1/23/2025. Anterior ST elevations.  He had Proximal LAD stenting done on 1/16/2025.     100 % occlusion on proximal LAD stent with thrombus.    Successful PCI with thrombectomy and placement of a 3.0 x 34 mm MAGALI post dilated to 3.5 mm.    ST elevations improved, still up.    Procedure done RFA - closed with angioseal.

## 2025-01-24 NOTE — CONSULTS
Consultation - General Cardiology Team 2  Segun Grissom 59 y.o. male MRN: [id number]  Unit/Bed#: ICU 02 Encounter: [id number]          Inpatient consult to Cardiology     Date/Time  1/24/2025 3:13 PM     Performed by  Vivek Lema MD   Authorized by  Carlyn Lemos MD           PCP: MAREN Ramirez   Outpatient Cardiologist: N/A    History of Present Illness   Physician Requesting Consult: Tobi Hudson MD  Reason for Consult / Principal Problem: STEMI- instent     Assessment & Plan       Plan:  Heparin discontinued   Continue with aspirin 81 mg daily and ticagrelor 90 mg twice daily  [facility] does not perform P2Y12 resistance(test 20213); will need to do it as outpatient    Currently uninsured, working with  on getting Medicare  Resume atorvastatin 80 mg daily, isosorbide mononitrate 30 mg daily, metoprolol titrate 50 mg twice daily once hemodynamically stable  Resume losartan 50 mg daily  Frequent PVCs noted on Telemetry, will continue to monitor  Repeat TTE  Outpatient follow-up with [facility] cardiology    Discharge cardiac medications:   Morning: Aspirin 81 mg daily, ticagrelor (Brilinta) 90 mg twice daily, metoprolol tartrate 50 mg, isosorbide mononitrate 60 mg and spironolactone 50 mg daily.  Lunch: Losartan 50 mg daily.  Dinner: Metoprolol tartrate 50 mg, amlodipine 10 mg, atorvastatin 80 mg daily, ticagrelor 90 mg twice daily      Assessment:  Assessment & Plan  STEMI involving left anterior descending coronary artery (HCC)  - Presented to hospital with STEMI, 8 days after undergoing PCI to LAD. Noted to have stent-thrombosis   - Chest pain started the night prior to presentation at 2200. ECG suggestive of STEMI   - [facility](1/24/25): RFA access, 100 % occlusion on proximal LAD stent with thrombus. Successful PCI with thrombectomy and placement of a 3.0 x 34 mm MAGALI post dilated to 3.5 mm  - Currently on Brilinta and aspirin  Coronary stent thrombosis  -Left heart cath  on 1/24/2025 shows stent thrombosis only 8 days after PCI to proximal LAD  - Could possibly be secondary to Plavix resistance   Possible Plavix resistance  -Will need outpatient P2 Y12 resistance test  Coronary artery disease involving native coronary artery of native heart  - NSTEI on 1/14/25 s/p PCI to prox LAD   - STEMI on 1/24/25 s/p thrombectomy of prox LAD stent and MAGALI PCI to prox LAD  - Now on Brilinta and ASA 81 mg   -- TTE (1/15/2025):   Severe concentric left ventricular hypertrophy, normal LV function EF 68%, grade 1 diastolic dysfunction, aortic valve sclerosis and mitral valve sclerosis, no significant stenosis or regurgitation.  No left or right atrial or RV enlargement, trace tricuspid valve regurgitation, no pulmonary hypertension.    Primary hypertension  - LVH on echo   -Previously on Amlodipine 5mg, hydrochlorothiazide 12.5mg, Lisinopril 40mg QD   Type 2 diabetes mellitus, without long-term current use of insulin (HCC)    Lab Results   Component Value Date    HGBA1C 6.7 (H) 01/14/2025       Case discussed and reviewed with Dr. Ling who agrees with my assessment and plan.Thank you for involving us in the care of your patient.      Vivek Lema MD  Cardiology Fellow   PGY-6    HPI: Segun Grissom is a 59 y.o. year old male with history of recent NSTEMI 1/14/25 CAD s/p PCI to prox LAD x1 , HTN,  who presented with chest pain stating at 2200 last evening, ECG suggestive of STEMI,in II, III and AVF, V2,V6 and depression in V5 and V6. Patient was discharged from [facility] on 1/17/25 after an admission for NSTEMI, underwent PCI to LAD, on Plavix, ASA, and Lipitor 80mg.       Review of Systems  Review of system was conducted and was negative except for as stated in the HPI.      Historical Information   Past Medical History:   Diagnosis Date    Asthma     Hyperlipidemia     Hypertension     Sinus congestion      Past Surgical History:   Procedure Laterality Date    CARDIAC CATHETERIZATION Left  1/16/2025    Procedure: Cardiac Left Heart Cath;  Surgeon: Tammy Woodard DO;  Location: [facility] CARDIAC CATH LAB;  Service: Cardiology    CARDIAC CATHETERIZATION N/A 1/16/2025    Procedure: Cardiac Coronary Angiogram;  Surgeon: Tammy Woodard DO;  Location: [facility];  Service: Cardiology    CARDIAC CATHETERIZATION  1/16/2025    Procedure: Left Heart Catherization;  Surgeon: Tammy Woodard DO;  Location: [facility];  Service: Cardiology    CARDIAC CATHETERIZATION N/A 1/16/2025    Procedure: Cardiac PCI;  Surgeon: Tammy Woodard DO;  Location: [facility]ARDIAC C[facility];  Service: Cardiology    CARPAL TUNNEL RELEASE      right hand     Social History     Substance and Sexual Activity   Alcohol Use Never     Social History     Substance and Sexual Activity   Drug Use Never     Social History     Tobacco Use   Smoking Status Never   Smokeless Tobacco Never     Family History: Family history non-contributory    Meds/Allergies   Hospital Medications:   Current Facility-Administered Medications:     aspirin (ECOTRIN LOW STRENGTH) EC tablet 81 mg, Daily    lidocaine (LIDODERM) 5 % patch 1 patch, Daily    morphine injection 2 mg, Q3H PRN    Insert peripheral IV, Once **AND** [Transfer Hold] sodium chloride (PF) 0.9 % injection 3 mL, Q1H PRN  Home Medications:   Medications Prior to Admission:     albuterol (PROVENTIL HFA,VENTOLIN HFA) 90 mcg/act inhaler    amLODIPine (NORVASC) 10 mg tablet    aspirin 81 mg chewable tablet    atorvastatin (LIPITOR) 80 mg tablet    Blood Pressure Monitoring (M[employer] Choice BP Monitor/Arm) KELLY    clopidogrel (PLAVIX) 75 mg tablet    fluticasone-salmeterol (Advair HFA) 230-21 MCG/ACT inhaler    isosorbide mononitrate (IMDUR) 60 mg 24 hr tablet    losartan (COZAAR) 50 mg tablet    metFORMIN (GLUCOPHAGE-XR) 500 mg 24 hr tablet    metoprolol tartrate (LOPRESSOR) 50 mg tablet    spironolactone (ALDACTONE) 50 mg tablet    No Known Allergies    Objective   Vitals: Blood  "pressure 126/66, pulse (!) 108, temperature 98.8 °F (37.1 °C), temperature source Oral, resp. rate 18, height 5' 6\" (1.676 m), weight 89.6 kg (197 lb 8.5 oz), SpO2 99%.  Orthostatic Blood Pressures      Flowsheet Row Most Recent Value   Blood Pressure 126/66 filed at 01/24/2025 1300   Patient Position - Orthostatic VS Lying filed at 01/24/2025 0838              Invasive Devices       Peripheral Intravenous Line  Duration             Peripheral IV 01/24/25 Left Antecubital <1 day    Peripheral IV 01/24/25 Right Antecubital <1 day                    Physical Exam    GEN: Segun Grissom appears well, alert and oriented x 3, pleasant and cooperative, in mild distress   NECK: No JVD or carotid bruits   HEART: Regular rhythm, normal rate, normal S1 and S2, no murmurs, clicks, gallops or rubs   LUNGS: Clear to auscultation bilaterally; no wheezes, rales, or rhonchi; respiration nonlabored   ABDOMEN:  Normoactive bowel sounds, soft, no tenderness, no distention  EXTREMITIES: peripheral pulses palpable; no edema    Lab Results: I have personally reviewed pertinent lab results.    Results from last 7 days   Lab Units 01/24/25  1114 01/24/25  0908 01/24/25  0646   HS TNI 0HR ng/L  --   --  9   HS TNI 2HR ng/L  --  >22,973*  --    HS TNI 4HR ng/L >22,973*  --   --          Results from last 7 days   Lab Units 01/24/25  0646   POTASSIUM mmol/L 4.0   CO2 mmol/L 24   CHLORIDE mmol/L 102   BUN mg/dL 24   CREATININE mg/dL 1.15     Results from last 7 days   Lab Units 01/24/25  0914 01/24/25  0646   HEMOGLOBIN g/dL 13.9 14.9   HEMATOCRIT % 42.3 44.1   PLATELETS Thousands/uL 297 335             Telemetry: Sinus tachycardia with HR in 100-110 bpm, frequent PVCs noted.         Previous STRESS TEST:  No results found for this or any previous visit.     No results found for this or any previous visit.    No results found for this or any previous visit.      Previous Cath/PCI:  No results found for this or any previous visit.    No " results found for this or any previous visit.    No results found for this or any previous visit.      ECHO:  No results found for this or any previous visit.    Results for orders placed during the hospital encounter of 01/14/25    Echo complete    Interpretation Summary  Technically difficult transthoracic echocardiogram study due to decreased echo penetration.    Severe concentric left ventricular hypertrophy, normal left ventricular systolic function, early, compensated grade 1 diastolic dysfunction.  Left ventricle ejection fraction is estimated as around 68%.  Normal right ventricular size and systolic function.  Normal left and right atrial cavity size.  Aortic valve leaflet sclerosis, no aortic valve stenosis or regurgitation.  Mild mitral valve annular calcification.  Mild mitral valve leaflet sclerosis.  No significant mitral valve stenosis or regurgitation.  Trace tricuspid valve regurgitation.  No obvious pulmonary hypertension.  No pericardial effusion.    No previous echocardiogram is available for comparison.      JAILENE:  No results found for this or any previous visit.    No results found for this or any previous visit.      CMR:  No results found for this or any previous visit.    No results found for this or any previous visit.    No results found for this or any previous visit.      HOLTER  No results found for this or any previous visit.    No results found for this or any previous visit.

## 2025-01-24 NOTE — CASE MANAGEMENT
Case Management Assessment & Discharge Planning Note    Patient name Segun Grissom  Location ICU ICU  MRN [id number]  : 1965 Date 2025       Current Admission Date: 2025  Current Admission Diagnosis:STEMI involving left anterior descending coronary artery (HCC)   Patient Active Problem List    Diagnosis Date Noted Date Diagnosed    Coronary stent thrombosis 2025     Coronary artery disease involving native coronary artery of native heart 2025     Possible Plavix resistance 2025     STEMI involving left anterior descending coronary artery (HCC) 01/15/2025     Type 2 diabetes mellitus, without long-term current use of insulin ([facility]) 01/15/2025     Body mass index (BMI) 32.0-32.9, adult 03/15/2023     Acquired trigger finger 2023     Carpal tunnel syndrome 2023     Pain in right hand 2023     CPAP (continuous positive airway pressure) dependence 2022     Moderate obstructive sleep apnea 2021     Rash 2021     Loud snoring 2021     Asthma 2021     Tinnitus of both ears 2021     Allergic rhinitis 2021     Other male erectile dysfunction 2021     Dyslipidemia 2014     Primary hypertension 2014     Chest pain 2014     Arthritis 2014     Chronic nasal congestion 2014     Vertigo 2014       LOS (days): 0  Geometric Mean LOS (GMLOS) (days):   Days to GMLOS:     OBJECTIVE:              Current admission status: Outpatient Procedure       Preferred Pharmacy:   [facility] - [address]   [address] [facility] [address]  Phone: [phone] Fax: [phone]    [facility] [place] - [address]  [facility],  [address]  [facility],  First [address] [facility] [address]  Phone: [phone] Fax: [phone]    Primary Care Provider: MAREN Ramirez    Primary Insurance: [employer]ALK[employer] PENDING  Secondary Insurance:      ASSESSMENT:  Active Health Care Proxies       Luli Cordova Health Care Representative - Spouse   Primary Phone: [phone] (Mobile)                 Advance Directives  Does patient have a Health Care POA?: No  Was patient offered paperwork?: Yes (declined)  Does patient currently have a Health Care decision maker?: Yes, please see Health Care Proxy section  Does patient have Advance Directives?: No  Was patient offered paperwork?: Yes (declined)  Primary Contact: Luli Cordova (spouse) [phone]         Readmission Root Cause  30 Day Readmission: No, Yes  During your hospital stay, did someone (provider, nurse, ) explain your care to you in a way you could understand?: Yes  Did you feel medically stable to leave the hospital?: Yes  Were you able to pay for your medication at the pharmacy?: Yes  Did you have reliable transportation to take you to your appointments?: Yes  During previous admission, was a post-acute recommendation made?: Yes  What post-acute resources were offered?: [facility]  Patient was readmitted due to: STEMI  Action Plan: Cardiac cath    Patient Information  Admitted from:: Home  Mental Status: Alert  During Assessment patient was accompanied by: Spouse, Brother  Assessment information provided by:: Patient, Spouse  Primary Caregiver: Self  Support Systems: Spouse/significant other  County of Residence: [place]  What city do you live in?: [place]  Type of Current Residence: Other (Comment)  Living Arrangements: Lives w/ Spouse/significant other  Is patient a ?: No    Activities of Daily Living Prior to Admission  Functional Status: Independent  Completes ADLs independently?: Yes  Ambulates independently?: Yes  Does patient use assisted devices?: No  Does patient currently own DME?: No  Does patient have a history of Outpatient Therapy (PT/OT)?: No  Does the patient have a history of Short-Term Rehab?: No  Does patient have a history of HHC?: No  Does patient  currently have [facility]?: No         Patient Information Continued  Income Source: Unemployed  Does patient have prescription coverage?: No (Financial counselor, indegent medication)  Does patient receive dialysis treatments?: No  Does patient have a history of substance abuse?: No  Does patient have a history of Mental Health Diagnosis?: No         Means of Transportation  Means of Transport to Appts:: Family transport      Social Determinants of Health (SDOH)      Flowsheet Row Most Recent Value   Housing Stability    In the last 12 months, was there a time when you were not able to pay the mortgage or rent on time? N   In the past 12 months, how many times have you moved where you were living? 0   At any time in the past 12 months, were you homeless or living in a shelter (including now)? N   Transportation Needs    In the past 12 months, has lack of transportation kept you from medical appointments or from getting medications? no   In the past 12 months, has lack of transportation kept you from meetings, work, or from getting things needed for daily living? No   Food Insecurity    Within the past 12 months, you worried that your food would run out before you got the money to buy more. Never true   Within the past 12 months, the food you bought just didn't last and you didn't have money to get more. Never true   Utilities    In the past 12 months has the electric, gas, oil, or water company threatened to shut off services in your home? No            DISCHARGE DETAILS:    Discharge planning discussed with:: patient and spouse  Freedom of Choice: Yes  Comments - Freedom of Choice: Home with Op follow up  CM contacted family/caregiver?: Yes  Were Treatment Team discharge recommendations reviewed with patient/caregiver?: Yes  Did patient/caregiver verbalize understanding of patient care needs?: Yes  Were patient/caregiver advised of the risks associated with not following Treatment Team discharge recommendations?:  Yes    Contacts  Patient Contacts: abiodun Rockwell  Relationship to Patient:: Family  Contact Method: In Person  Reason/Outcome: Continuity of Care, Emergency Contact, Discharge Planning    Requested Home Health Care         Is the patient interested in HHC at discharge?: No    DME Referral Provided  Referral made for DME?: No    Other Referral/Resources/Interventions Provided:  Financial Resources Provided: Financial Counselor, Indigent Medication    Would you like to participate in our [facility] service program?  : Yes       Discharge Destination Plan:: Home         Additional Comments: CM Met with the patient at the bedside for intake assessment and discharge planning, CM introduced self and reviewed role. Patient admits to living with his spouse in a 2 story house. Patient is independent at baseline and does not use any assistive devices. CM is anticipating the patient will be discharged home with  out -patient follow up, However CM will continue to follow the patient for discharge planning.

## 2025-01-24 NOTE — ASSESSMENT & PLAN NOTE
- NSTEI on 1/14/25 s/p PCI to prox LAD   - STEMI on 1/24/25 s/p thrombectomy of prox LAD stent and MAGALI PCI to prox LAD  - Now on Brilinta and ASA 81 mg   -- TTE (1/15/2025):   Severe concentric left ventricular hypertrophy, normal LV function EF 68%, grade 1 diastolic dysfunction, aortic valve sclerosis and mitral valve sclerosis, no significant stenosis or regurgitation.  No left or right atrial or RV enlargement, trace tricuspid valve regurgitation, no pulmonary hypertension.

## 2025-01-24 NOTE — ASSESSMENT & PLAN NOTE
Pediatric Advanced Care Team (PACT)  DAILY PROGRESS NOTE      Subjective:  No acute events overnight. Tolerating NG feedings; took small amounts of PO throughout the day yesterday. Emesis x1, though mom thinks it was caused from syringe feeding water too quickly, triggering him to gag/vomit.  Has had 3 BMs in the last 24 hours, with the most recent being loose/diarrhea.   Mom reports he has been comfortable appearing, no concerns for pain.    Objective:    Last BM: x3 per mom (smear, small, large)  Episodes of emesis: x2  PO intake: 363 mL (NG feedings) 130 mL (PO feeds)  PRN used in 24 hrs: none      VITAL SIGNS:     Vital Last Value   Temperature 98.1 °F (36.7 °C) (08/16/23 0800)   Pulse 131 (08/16/23 0800)   Respiratory 26 (08/16/23 0800)   Non-Invasive  Blood Pressure 90/54 (08/16/23 0800)   Pulse Oximetry 100 % (08/16/23 1000)     Vital Today   Weight 18.2 kg (40 lb 2 oz) (08/08/23 1328)   Height N/A   Body Mass Index N/A      DIAGNOSTICS  Reviewed in EPIC    MEDICATIONS:  Acetaminophen 268.8 mg PO Q6H  Docusate 45 mg per NG tube BID  Levetiracetam 350 mg per NG tube Q12H  Melatonin 2 mg per NG tube at bedtime  Polyethylene Glycol 8.5 g per NG tube BID  Sennosides 8.8 per NG tube BID    PRN  Lacosamide 36 mg IV once PRN seizures  Lidocaine PRN lab draws  Lorazepam 1.82 mg IV Q4H PRN seizures  Morphine 1.8 mg IV Q6H PRN pain  Ondansetron 2.7 mg Q8H PRN nausea/vomiting    Physical Exam:  Physical Exam  Constitutional:       General: He is not in acute distress.     Appearance: He is not toxic-appearing.   HENT:      Head: Normocephalic.      Comments: Surgical dressing in place over  shunt insertion site. Dressing C/D/I     Nose: No congestion or rhinorrhea.      Mouth/Throat:      Mouth: Mucous membranes are moist.   Eyes:      Conjunctiva/sclera: Conjunctivae normal.   Cardiovascular:      Rate and Rhythm: Normal rate and regular rhythm.   Pulmonary:      Effort: Pulmonary effort is normal. No respiratory  -Left heart cath on 1/24/2025 shows stent thrombosis only 8 days after PCI to proximal LAD  - Could possibly be secondary to Plavix resistance    distress, nasal flaring or retractions.   Abdominal:      General: Abdomen is flat. There is no distension.   Skin:     General: Skin is warm.      Capillary Refill: Capillary refill takes less than 2 seconds.   Neurological:      Mental Status: He is alert.         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 Hospital course significant for seizure activity requiring anti-epileptic management, s/p EVD placed 8/8, now s/p  shunt placement this morning. Symptom burden includes vomiting and constipation, both of which mom shares are long standing issues for Jack; both improving. Overall clinically stable but at risk for deterioration.     Met with mom at bedside. Questions answered and support provided. Discussed with primary team     Impression/Plan:      Advance Care Planning             1. Primary medical management per PICU and neurosurgery teams.               2. Full code             3. We will assist with any advance care planning and goals of care discussions in collaboration with primary service.      Pain and Symptom Management             1. Pain Plan                        1) Consider transitioning Tylenol 15 mg/kg Q6H to Q6H PRN    2) Continue discontinuing Morphine 1.8 mg IV Q6H PRN                2. Bowel Regimen                        1) Continue docusate as prescribed                        2) Decrease senna to daily                        3) Miralax 8.5 g per NG tube BID                3. Nausea and Vomiting                        1) Zofran 2.7 mg per NG tube (or IV if not tolerating meds via NG tube) Q8H PRN nausea/vomiting. If requiring 2 or more doses, consider scheduling Q6H.      Supportive Care             1. Appreciate social work, , CCLS involvement             2. Appreciate Music and Art Therapy, as appropriate.              3. Appreciate OT/ST/PT support    Thank you for involving the Pediatric Advanced Care Team (PACT). Please contact the covering provider via [employer] Serve with further questions or concerns.    I spent 40 minutes in the care of this patient, with greater than 50% of time being spent counseling/coordinating care as described above.    ALVERTO Adams   Pediatric Advanced Care Team (PACT

## 2025-01-25 ENCOUNTER — APPOINTMENT (INPATIENT)
Dept: NON INVASIVE DIAGNOSTICS | Facility: HOSPITAL | Age: 60
DRG: 175 | End: 2025-01-25
Payer: COMMERCIAL

## 2025-01-25 LAB
ALBUMIN SERPL BCG-MCNC: 3.7 G/DL (ref 3.5–5)
ALP SERPL-CCNC: 69 U/L (ref 34–104)
ALT SERPL W P-5'-P-CCNC: 102 U/L (ref 7–52)
ANION GAP SERPL CALCULATED.3IONS-SCNC: 2 MMOL/L (ref 4–13)
AST SERPL W P-5'-P-CCNC: 336 U/L (ref 13–39)
BASOPHILS # BLD AUTO: 0.02 THOUSANDS/ΜL (ref 0–0.1)
BASOPHILS NFR BLD AUTO: 0 % (ref 0–1)
BILIRUB SERPL-MCNC: 0.45 MG/DL (ref 0.2–1)
BUN SERPL-MCNC: 25 MG/DL (ref 5–25)
CALCIUM SERPL-MCNC: 8.5 MG/DL (ref 8.4–10.2)
CHLORIDE SERPL-SCNC: 106 MMOL/L (ref 96–108)
CO2 SERPL-SCNC: 29 MMOL/L (ref 21–32)
CREAT SERPL-MCNC: 0.93 MG/DL (ref 0.6–1.3)
EOSINOPHIL # BLD AUTO: 0.01 THOUSAND/ΜL (ref 0–0.61)
EOSINOPHIL NFR BLD AUTO: 0 % (ref 0–6)
ERYTHROCYTE [DISTWIDTH] IN BLOOD BY AUTOMATED COUNT: 14.6 % (ref 11.6–15.1)
GFR SERPL CREATININE-BSD FRML MDRD: 88 ML/MIN/1.73SQ M
GLUCOSE SERPL-MCNC: 117 MG/DL (ref 65–140)
GLUCOSE SERPL-MCNC: 135 MG/DL (ref 65–140)
GLUCOSE SERPL-MCNC: 145 MG/DL (ref 65–140)
GLUCOSE SERPL-MCNC: 146 MG/DL (ref 65–140)
HCT VFR BLD AUTO: 39.3 % (ref 36.5–49.3)
HGB BLD-MCNC: 13.1 G/DL (ref 12–17)
IMM GRANULOCYTES # BLD AUTO: 0.06 THOUSAND/UL (ref 0–0.2)
IMM GRANULOCYTES NFR BLD AUTO: 0 % (ref 0–2)
LYMPHOCYTES # BLD AUTO: 1.14 THOUSANDS/ΜL (ref 0.6–4.47)
LYMPHOCYTES NFR BLD AUTO: 8 % (ref 14–44)
MAGNESIUM SERPL-MCNC: 1.9 MG/DL (ref 1.9–2.7)
MCH RBC QN AUTO: 28.1 PG (ref 26.8–34.3)
MCHC RBC AUTO-ENTMCNC: 33.3 G/DL (ref 31.4–37.4)
MCV RBC AUTO: 84 FL (ref 82–98)
MONOCYTES # BLD AUTO: 1.25 THOUSAND/ΜL (ref 0.17–1.22)
MONOCYTES NFR BLD AUTO: 9 % (ref 4–12)
NEUTROPHILS # BLD AUTO: 11.78 THOUSANDS/ΜL (ref 1.85–7.62)
NEUTS SEG NFR BLD AUTO: 83 % (ref 43–75)
NRBC BLD AUTO-RTO: 0 /100 WBCS
PHOSPHATE SERPL-MCNC: 2.5 MG/DL (ref 2.3–4.1)
PLATELET # BLD AUTO: 263 THOUSANDS/UL (ref 149–390)
PMV BLD AUTO: 10.9 FL (ref 8.9–12.7)
POTASSIUM SERPL-SCNC: 3.9 MMOL/L (ref 3.5–5.3)
PROT SERPL-MCNC: 6.8 G/DL (ref 6.4–8.4)
RBC # BLD AUTO: 4.67 MILLION/UL (ref 3.88–5.62)
SODIUM SERPL-SCNC: 137 MMOL/L (ref 135–147)
WBC # BLD AUTO: 14.26 THOUSAND/UL (ref 4.31–10.16)

## 2025-01-25 PROCEDURE — 80053 COMPREHEN METABOLIC PANEL: CPT

## 2025-01-25 PROCEDURE — 82948 REAGENT STRIP/BLOOD GLUCOSE: CPT

## 2025-01-25 PROCEDURE — 93308 TTE F-UP OR LMTD: CPT

## 2025-01-25 PROCEDURE — 83735 ASSAY OF MAGNESIUM: CPT

## 2025-01-25 PROCEDURE — 99232 SBSQ HOSP IP/OBS MODERATE 35: CPT | Performed by: INTERNAL MEDICINE

## 2025-01-25 PROCEDURE — 93308 TTE F-UP OR LMTD: CPT | Performed by: INTERNAL MEDICINE

## 2025-01-25 PROCEDURE — 85025 COMPLETE CBC W/AUTO DIFF WBC: CPT

## 2025-01-25 PROCEDURE — 84100 ASSAY OF PHOSPHORUS: CPT

## 2025-01-25 RX ADMIN — CHLORHEXIDINE GLUCONATE 15 ML: 1.2 SOLUTION ORAL at 08:07

## 2025-01-25 RX ADMIN — METOPROLOL TARTRATE 25 MG: 25 TABLET, FILM COATED ORAL at 08:07

## 2025-01-25 RX ADMIN — ISOSORBIDE MONONITRATE 30 MG: 30 TABLET, EXTENDED RELEASE ORAL at 08:07

## 2025-01-25 RX ADMIN — ATORVASTATIN CALCIUM 80 MG: 80 TABLET, FILM COATED ORAL at 16:05

## 2025-01-25 RX ADMIN — MORPHINE SULFATE 2 MG: 2 INJECTION, SOLUTION INTRAMUSCULAR; INTRAVENOUS at 03:15

## 2025-01-25 RX ADMIN — TICAGRELOR 90 MG: 90 TABLET ORAL at 20:59

## 2025-01-25 RX ADMIN — ASPIRIN 81 MG: 81 TABLET, COATED ORAL at 08:07

## 2025-01-25 RX ADMIN — METOPROLOL TARTRATE 25 MG: 25 TABLET, FILM COATED ORAL at 20:58

## 2025-01-25 RX ADMIN — ENOXAPARIN SODIUM 40 MG: 40 INJECTION SUBCUTANEOUS at 08:07

## 2025-01-25 RX ADMIN — TICAGRELOR 90 MG: 90 TABLET ORAL at 08:07

## 2025-01-25 NOTE — PLAN OF CARE
Problem: Prexisting or High Potential for Compromised Skin Integrity  Goal: Skin integrity is maintained or improved  Description: INTERVENTIONS:  - Identify patients at risk for skin breakdown  - Assess and monitor skin integrity  - Assess and monitor nutrition and hydration status  - Monitor labs   - Assess for incontinence   - Turn and reposition patient  - Assist with mobility/ambulation  - Relieve pressure over bony prominences  - Avoid friction and shearing  - Provide appropriate hygiene as needed including keeping skin clean and dry  - Evaluate need for skin moisturizer/barrier cream  - Collaborate with interdisciplinary team   - Patient/family teaching  - Consider wound care consult   Outcome: Progressing     Problem: Nutrition/Hydration-ADULT  Goal: Nutrient/Hydration intake appropriate for improving, restoring or maintaining nutritional needs  Description: Monitor and assess patient's nutrition/hydration status for malnutrition. Collaborate with interdisciplinary team and initiate plan and interventions as ordered.  Monitor patient's weight and dietary intake as ordered or per policy. Utilize nutrition screening tool and intervene as necessary. Determine patient's food preferences and provide high-protein, high-caloric foods as appropriate.     INTERVENTIONS:  - Monitor oral intake, urinary output, labs, and treatment plans  - Assess nutrition and hydration status and recommend course of action  - Evaluate amount of meals eaten  - Assist patient with eating if necessary   - Allow adequate time for meals  - Recommend/ encourage appropriate diets, oral nutritional supplements, and vitamin/mineral supplements  - Order, calculate, and assess calorie counts as needed  - Recommend, monitor, and adjust tube feedings and TPN/PPN based on assessed needs  - Assess need for intravenous fluids  - Provide specific nutrition/hydration education as appropriate  - Include patient/family/caregiver in decisions related to  nutrition  Outcome: Progressing     Problem: PAIN - ADULT  Goal: Verbalizes/displays adequate comfort level or baseline comfort level  Description: Interventions:  - Encourage patient to monitor pain and request assistance  - Assess pain using appropriate pain scale  - Administer analgesics based on type and severity of pain and evaluate response  - Implement non-pharmacological measures as appropriate and evaluate response  - Consider cultural and social influences on pain and pain management  - Notify physician/advanced practitioner if interventions unsuccessful or patient reports new pain  Outcome: Progressing     Problem: INFECTION - ADULT  Goal: Absence or prevention of progression during hospitalization  Description: INTERVENTIONS:  - Assess and monitor for signs and symptoms of infection  - Monitor lab/diagnostic results  - Monitor all insertion sites, i.e. indwelling lines, tubes, and drains  - Monitor endotracheal if appropriate and nasal secretions for changes in amount and color  - [place] appropriate cooling/warming therapies per order  - Administer medications as ordered  - Instruct and encourage patient and family to use good hand hygiene technique  - Identify and instruct in appropriate isolation precautions for identified infection/condition  Outcome: Progressing  Goal: Absence of fever/infection during neutropenic period  Description: INTERVENTIONS:  - Monitor WBC    Outcome: Progressing     Problem: SAFETY ADULT  Goal: Patient will remain free of falls  Description: INTERVENTIONS:  - Educate patient/family on patient safety including physical limitations  - Instruct patient to call for assistance with activity   - Consult OT/PT to assist with strengthening/mobility   - Keep Call bell within reach  - Keep bed low and locked with side rails adjusted as appropriate  - Keep care items and personal belongings within reach  - Initiate and maintain comfort rounds  - Make Fall Risk Sign visible to  staff  - Offer Toileting every 2 Hours, in advance of need  - Initiate/Maintain bed alarm  - Obtain necessary fall risk management equipment: alarms  - Apply yellow socks and bracelet for high fall risk patients  - Consider moving patient to room near nurses station  Outcome: Progressing  Goal: Maintain or return to baseline ADL function  Description: INTERVENTIONS:  -  Assess patient's ability to carry out ADLs; assess patient's baseline for ADL function and identify physical deficits which impact ability to perform ADLs (bathing, care of mouth/teeth, toileting, grooming, dressing, etc.)  - Assess/evaluate cause of self-care deficits   - Assess range of motion  - Assess patient's mobility; develop plan if impaired  - Assess patient's need for assistive devices and provide as appropriate  - Encourage maximum independence but intervene and supervise when necessary  - Involve family in performance of ADLs  - Assess for home care needs following discharge   - Consider OT consult to assist with ADL evaluation and planning for discharge  - Provide patient education as appropriate  Outcome: Progressing  Goal: Maintains/Returns to pre admission functional level  Description: INTERVENTIONS:  - Perform AM-PAC 6 Click Basic Mobility/ Daily Activity assessment daily.  - Set and communicate daily mobility goal to care team and patient/family/caregiver.   - Collaborate with rehabilitation services on mobility goals if consulted  - Perform Range of Motion 4 times a day.  - Reposition patient every 2 hours.  - Dangle patient 4 times a day  - Stand patient 4 times a day  - Ambulate patient 4 times a day  - Out of bed to chair 4 times a day   - Out of bed for meals 3 times a day  - Out of bed for toileting  - Record patient progress and toleration of activity level   Outcome: Progressing     Problem: DISCHARGE PLANNING  Goal: Discharge to home or other facility with appropriate resources  Description: INTERVENTIONS:  - Identify  barriers to discharge w/patient and caregiver  - Arrange for needed discharge resources and transportation as appropriate  - Identify discharge learning needs (meds, wound care, etc.)  - Arrange for interpretive services to assist at discharge as needed  - Refer to Case Management Department for coordinating discharge planning if the patient needs post-hospital services based on physician/advanced practitioner order or complex needs related to functional status, cognitive ability, or social support system  Outcome: Progressing     Problem: Knowledge Deficit  Goal: Patient/family/caregiver demonstrates understanding of disease process, treatment plan, medications, and discharge instructions  Description: Complete learning assessment and assess knowledge base.  Interventions:  - Provide teaching at level of understanding  - Provide teaching via preferred learning methods  Outcome: Progressing     Problem: DISCHARGE PLANNING - CARE MANAGEMENT  Goal: Discharge to post-acute care or home with appropriate resources  Description: INTERVENTIONS:  - Conduct assessment to determine patient/family and health care team treatment goals, and need for post-acute services based on payer coverage, community resources, and patient preferences, and barriers to discharge  - Address psychosocial, clinical, and financial barriers to discharge as identified in assessment in conjunction with the patient/family and health care team  - Arrange appropriate level of post-acute services according to patient’s   needs and preference and payer coverage in collaboration with the physician and health care team  - Communicate with and update the patient/family, physician, and health care team regarding progress on the discharge plan  - Arrange appropriate transportation to post-acute venues  Outcome: Progressing

## 2025-01-25 NOTE — ASSESSMENT & PLAN NOTE
Hx of recent NSTEMI 1/14/2025 CAD status post PCI to proximal LAD x 1.  He was discharged home on 1/17 on Plavix, aspirin, Lipitor.   Presented with chest pain on 1/24 to the emergency department that began at 2200 the evening prior.  ECG was suggestive of STEMI in 2, 3, aVF, V2, V6, depressions in V5, V6.   Taken for emergent cath was found to have 100% occlusion of the proximal LAD stent with thrombus.  Successful PCI with thrombectomy and placement of MAGALI.  ST elevations improved post cath.  Procedure was done from a right femoral approach.    Plan  Plavix discontinued and changed to Brilinta 90 mg twice daily, ASA 81, Lipitor 80 mg  Initially on heparin drip, now discontinued  Imdur daily, Lopressor q12   Appreciate Cardiology recommendations

## 2025-01-25 NOTE — ASSESSMENT & PLAN NOTE
Lab Results   Component Value Date    HGBA1C 6.7 (H) 01/14/2025     Sliding scale insulin ordered  On metformin at home     Recent Labs     01/24/25  2218   POCGLU 145*       Blood Sugar Average: Last 72 hrs:  (P) 145

## 2025-01-25 NOTE — H&P
"H&P - Critical Care/ICU   Name: Segun Grissom 59 y.o. male I MRN: [id number]  Unit/Bed#: ICU 02 I Date of Admission: 1/24/2025   Date of Service: 1/24/2025 I Hospital Day: 0       Assessment & Plan  STEMI involving left anterior descending coronary artery (HCC)   Hx of recent NSTEMI 1/14/2025 CAD status post PCI to proximal LAD x 1.  He was discharged home on 1/17 on Plavix, aspirin, Lipitor.   Presented with chest pain on 1/24 to the emergency department that began at 2200 the evening prior.  ECG was suggestive of STEMI in 2, 3, aVF, V2, V6, depressions in V5, V6.   Taken for emergent cath was found to have 100% occlusion of the proximal LAD stent with thrombus.  Successful PCI with thrombectomy and placement of MAGALI.  ST elevations improved post cath.  Procedure was done from a right femoral approach.    Plan  Plavix discontinued and changed to Brilinta 90 mg twice daily, ASA 81, Lipitor 80 mg  Initially on heparin drip, now discontinued  Imdur daily, Lopressor q12   Appreciate Cardiology recommendations  Coronary stent thrombosis  See plan above   Coronary artery disease involving native coronary artery of native heart  See plan above  Possible Plavix resistance  Unable to send P2 Y12 resistance test for Plavix sensitivity as an inpatient, will order as an outpatient  Per cardiology will treat as Plavix resistant and will start Brilinta 90 mg twice daily   Primary hypertension   Imdur daily, Lopressor q12   Type 2 diabetes mellitus, without long-term current use of insulin ([facility])  Lab Results   Component Value Date    HGBA1C 6.7 (H) 01/14/2025     Sliding scale insulin ordered  On metformin at home    No results for input(s): \"POCGLU\" in the last 72 hours.    Blood Sugar Average: Last 72 hrs:      Disposition: Stepdown Level 1    History of Present Illness     Segun Grissom is a 59 y.o. who has a history of recent NSTEMI 1/14/2025 CAD status post PCI to proximal LAD x 1.  He was discharged home on " 1/17 on Plavix, aspirin, Lipitor.  Patient also has history of hypertension.      He presented with chest pain on 1/24 to the emergency department that began at 2200 the evening prior.  ECG was suggestive of STEMI in 2, 3, aVF, V2, V6, depressions in V5, V6.     Was taken for emergent cath was found to have 100% occlusion of the proximal LAD stent with thrombus.  Successful PCI with thrombectomy and placement of MAGALI.  ST elevations improved post cath.  Procedure was done from a right femoral approach.    Patient admitted to ICU afterwards for monitoring.      History obtained from chart review and the patient.  Review of Systems: Review of Systems   Constitutional:  Negative for diaphoresis.   Respiratory:  Negative for chest tightness and shortness of breath.    Cardiovascular:  Positive for chest pain.        Mild chest pain remains, improved   Gastrointestinal:  Negative for abdominal pain, diarrhea and nausea.   Neurological:  Negative for dizziness.       Historical Information   Past Medical History:  No date: Asthma  No date: Hyperlipidemia  No date: Hypertension  No date: Sinus congestion Past Surgical History:  1/16/2025: CARDIAC CATHETERIZATION; Left      Comment:  Procedure: Cardiac Left Heart Cath;  Surgeon: Tammy Woodard DO;  Location: [facility];  Service:                Cardiology  1/16/2025: CARDIAC CATHETERIZATION; N/A      Comment:  Procedure: Cardiac Coronary Angiogram;  Surgeon:                Tammy Woodard DO;  Location: [facility];                 Service: Cardiology  1/16/2025: CARDIAC CATHETERIZATION      Comment:  Procedure: Left Heart Catherization;  Surgeon: Tammy Woodard DO;  Location: [facility];  Service:                Cardiology  1/16/2025: CARDIAC CATHETERIZATION; N/A      Comment:  Procedure: Cardiac PCI;  Surgeon: Tammy Woodard DO;               Location: [facility];  Service: Cardiology  No date:  CARPAL TUNNEL RELEASE      Comment:  right hand   Current Outpatient Medications   Medication Instructions    albuterol (PROVENTIL HFA,VENTOLIN HFA) 90 mcg/act inhaler 1 puff, Inhalation, Every 6 hours PRN    amLODIPine (NORVASC) 10 mg, Oral, Daily after dinner    aspirin 81 mg, Oral, Daily    atorvastatin (LIPITOR) 80 mg, Oral, Daily with dinner    Blood Pressure Monitoring ([employer] BP Monitor/Arm) KELLY Does not apply, Daily PRN    clopidogrel (PLAVIX) 75 mg, Oral, Daily    fluticasone-salmeterol (Advair HFA) 230-21 MCG/ACT inhaler 2 puffs, Inhalation, 2 times daily, Rinse mouth after use.    isosorbide mononitrate (IMDUR) 60 mg, Oral, Daily  (0200)    losartan (COZAAR) 50 mg, Oral, Daily after lunch    metFORMIN (GLUCOPHAGE-XR) 500 mg, Oral, Daily with dinner    metoprolol tartrate (LOPRESSOR) 50 mg, Oral, Every 12 hours scheduled    spironolactone (ALDACTONE) 50 mg, Oral, Daily    No Known Allergies   Social History     Tobacco Use    Smoking status: Never    Smokeless tobacco: Never   Vaping Use    Vaping status: Never Used   Substance Use Topics    Alcohol use: Never    Drug use: Never    History reviewed. No pertinent family history.       Objective :                   Vitals I/O      Most Recent Min/Max in 24hrs   Temp 99 °F (37.2 °C) Temp  Min: 97.7 °F (36.5 °C)  Max: 99 °F (37.2 °C)   Pulse (!) 110 Pulse  Min: 100  Max: 128   Resp (!) 23 Resp  Min: 16  Max: 26   /75 BP  Min: 125/81  Max: 150/86   O2 Sat 98 % SpO2  Min: 95 %  Max: 100 %      Intake/Output Summary (Last 24 hours) at 1/24/2025 2143  Last data filed at 1/24/2025 1129  Gross per 24 hour   Intake 120 ml   Output 300 ml   Net -180 ml       Diet Cardiovascular; Cardiac    Invasive Monitoring           Physical Exam   Physical Exam  Vitals reviewed.   Eyes:      General: Gaze aligned appropriately.   HENT:      Head: Normocephalic and atraumatic.   Cardiovascular:      Rate and Rhythm: Regular rhythm. Tachycardia present.      Heart  sounds: Normal heart sounds.   Musculoskeletal:      Cervical back: Normal range of motion.   Abdominal: General: Abdomen is protuberant. There is no distension.   Constitutional:       General: He is not in acute distress.     Appearance: He is not ill-appearing, toxic-appearing or diaphoretic.   Pulmonary:      Effort: No tachypnea.      Breath sounds: Normal breath sounds.   Neurological:      General: No focal deficit present.      Mental Status: He is alert, oriented to person, place, and time and easily aroused.   Genitourinary/Anorectal:     Comments: Right femoral access, soft, nontender clean dressing intact         Diagnostic Studies        Lab Results: I have reviewed the following results:     Medications:  Scheduled PRN   [START ON 1/25/2025] aspirin, 81 mg, Daily  atorvastatin, 80 mg, Daily With Dinner  chlorhexidine, 15 mL, Q12H ARIEL  [START ON 1/25/2025] enoxaparin, 40 mg, Daily  isosorbide mononitrate, 30 mg, Daily  lidocaine, 1 patch, Daily  metoprolol tartrate, 25 mg, Q12H ARIEL  [START ON 1/25/2025] ticagrelor, 90 mg, Q12H ARIEL      morphine injection, 2 mg, Q3H PRN  sodium chloride (PF), 3 mL, Q1H PRN       Continuous          Labs:   CBC    Recent Labs     01/24/25  0646 01/24/25  0914   WBC 22.70* 23.96*   HGB 14.9 13.9   HCT 44.1 42.3    297     BMP    Recent Labs     01/24/25  0646   SODIUM 138   K 4.0      CO2 24   AGAP 12   BUN 24   CREATININE 1.15   CALCIUM 9.6       Coags    Recent Labs     01/24/25  0917   INR 1.28*   PTT >210*        Additional Electrolytes  No recent results       Blood Gas    No recent results  No recent results LFTs  No recent results    Infectious  No recent results  Glucose  Recent Labs     01/24/25  0646   GLUC 214*

## 2025-01-25 NOTE — PROGRESS NOTES
Progress Note - Critical Care/ICU   Name: Segun Grissom 59 y.o. male I MRN: [id number]  Unit/Bed#: ICU 02 I Date of Admission: 1/24/2025   Date of Service: 1/25/2025 I Hospital Day: 1       Assessment & Plan  STEMI involving left anterior descending coronary artery (HCC)   Hx of recent NSTEMI 1/14/2025 CAD status post PCI to proximal LAD x 1.  He was discharged home on 1/17 on Plavix, aspirin, Lipitor.   Presented with chest pain on 1/24 to the emergency department that began at 2200 the evening prior.  ECG was suggestive of STEMI in 2, 3, aVF, V2, V6, depressions in V5, V6.   Taken for emergent cath was found to have 100% occlusion of the proximal LAD stent with thrombus.  Successful PCI with thrombectomy and placement of MAGALI.  ST elevations improved post cath.  Procedure was done from a right femoral approach.    Plan  Plavix discontinued and changed to Brilinta 90 mg twice daily, ASA 81, Lipitor 80 mg  Initially on heparin drip, now discontinued   Imdur daily, Lopressor q12    Appreciate Cardiology recommendations   Coronary stent thrombosis  See plan above    Coronary artery disease involving native coronary artery of native heart  See plan above   Possible Plavix resistance  Unable to send P2 Y12 resistance test for Plavix sensitivity as an inpatient, will order as an outpatient  Per cardiology will treat as Plavix resistant and will start Brilinta 90 mg twice daily    Primary hypertension   Imdur daily, Lopressor q12    Type 2 diabetes mellitus, without long-term current use of insulin ([facility])  Lab Results   Component Value Date    HGBA1C 6.7 (H) 01/14/2025     Sliding scale insulin ordered  On metformin at home     Recent Labs     01/24/25  2218   POCGLU 145*       Blood Sugar Average: Last 72 hrs:  (P) 145    Disposition: Med Surg with Telemetry    ICU Core Measures     A: Assess, Prevent, and Manage Pain Has pain been assessed? Yes  Need for changes to pain regimen? No   B: Both SAT/SAT  N/A   C:  Choice of Sedation RASS Goal: 0 Alert and Calm or N/A patient not on sedation  Need for changes to sedation or analgesia regimen? NA   D: Delirium CAM-ICU: Negative   E: Early Mobility  Plan for early mobility? Yes   F: Family Engagement Plan for family engagement today? Yes         Prophylaxis:  VTE VTE covered by:  enoxaparin, Subcutaneous       Stress Ulcer  not ordered         24 Hour Events : no acute events overnight     Subjective   Review of Systems: Review of Systems   Constitutional:  Negative for chills and fatigue.   Respiratory:  Negative for shortness of breath.    Cardiovascular:  Positive for chest pain.        CP continues to improve per patient    Gastrointestinal:  Negative for nausea and vomiting.   Musculoskeletal:  Negative for back pain.   Neurological:  Negative for dizziness, light-headedness and headaches.       Objective :                   Vitals I/O      Most Recent Min/Max in 24hrs   Temp 98.3 °F (36.8 °C) Temp  Min: 97.7 °F (36.5 °C)  Max: 99 °F (37.2 °C)   Pulse 97 Pulse  Min: 97  Max: 128   Resp (!) 24 Resp  Min: 16  Max: 26   /85 BP  Min: 125/81  Max: 150/86   O2 Sat 98 % SpO2  Min: 95 %  Max: 100 %      Intake/Output Summary (Last 24 hours) at 1/25/2025 0017  Last data filed at 1/24/2025 1129  Gross per 24 hour   Intake 120 ml   Output 300 ml   Net -180 ml       Diet Cardiovascular; Cardiac    Invasive Monitoring           Physical Exam   Physical Exam  Vitals reviewed.   Eyes:      General: Gaze aligned appropriately.   HENT:      Head: Normocephalic and atraumatic.   Cardiovascular:      Rate and Rhythm: Normal rate and regular rhythm.      Heart sounds: Normal heart sounds.   Musculoskeletal:      Cervical back: Normal range of motion.   Abdominal: General: Abdomen is protuberant. There is no distension.   Constitutional:       General: He is not in acute distress.     Appearance: He is not ill-appearing, toxic-appearing or diaphoretic.   Pulmonary:      Effort: No  tachypnea.      Breath sounds: Normal breath sounds.   Neurological:      General: No focal deficit present.      Mental Status: He is oriented to person, place, and time.   Genitourinary/Anorectal:     Comments: R groin site soft, no hematoma         Diagnostic Studies        Lab Results: I have reviewed the following results:     Medications:  Scheduled PRN   aspirin, 81 mg, Daily  atorvastatin, 80 mg, Daily With Dinner  chlorhexidine, 15 mL, Q12H ARIEL  enoxaparin, 40 mg, Daily  insulin lispro, 1-6 Units, TID AC  isosorbide mononitrate, 30 mg, Daily  lidocaine, 1 patch, Daily  metoprolol tartrate, 25 mg, Q12H ARIEL  ticagrelor, 90 mg, Q12H ARIEL      morphine injection, 2 mg, Q3H PRN  sodium chloride (PF), 3 mL, Q1H PRN       Continuous          Labs:   CBC    Recent Labs     01/24/25  0646 01/24/25  0914   WBC 22.70* 23.96*   HGB 14.9 13.9   HCT 44.1 42.3    297     BMP    Recent Labs     01/24/25  0646   SODIUM 138   K 4.0      CO2 24   AGAP 12   BUN 24   CREATININE 1.15   CALCIUM 9.6       Coags    Recent Labs     01/24/25  0917   INR 1.28*   PTT >210*        Additional Electrolytes  No recent results       Blood Gas    No recent results  No recent results LFTs  No recent results    Infectious  No recent results  Glucose  Recent Labs     01/24/25  0646   GLUC 214*

## 2025-01-25 NOTE — ASSESSMENT & PLAN NOTE
Unable to send P2 Y12 resistance test for Plavix sensitivity as an inpatient, will order as an outpatient  Per cardiology will treat as Plavix resistant and will start Brilinta 90 mg twice daily

## 2025-01-25 NOTE — ASSESSMENT & PLAN NOTE
"Lab Results   Component Value Date    HGBA1C 6.7 (H) 01/14/2025     Sliding scale insulin ordered  On metformin at home    No results for input(s): \"POCGLU\" in the last 72 hours.    Blood Sugar Average: Last 72 hrs:      "

## 2025-01-25 NOTE — PROGRESS NOTES
Cardiology Progress Note   MD S[facility] Severino [facility], [facility]  Malcolm Garcia DO, [facility]  MD Katelin Chavez DO, [facility]  Germain Elliott DO, [facility]  ----------------------------------------------------------------  [id number] [place] [address]    Segun Grissom 59 y.o. male MRN: [id number]  Unit/Bed#: ICU 02 Encounter: [id number]      ASSESSMENT:   STEMI w/ in-stent thrombosis of pLAD stent with thrombectomy and MAGALI-pLAD, January 2025  CAD  NSTEMI s/p cath w/ MAGALI-pLAD  Hypertension  LVEF 68%, severe LVH, grade 1 diastolic dysfunction, AV sclerosis, mild MAC, trace TR, January 2025  Dyslipidemia  Type 2 diabetes mellitus  Possible Plavix resistance    PLAN:  Patient had STEMI with in-stent thrombosis  Possibly there is some degree of P2Y12 inhibitor resistance with Plavix  For now, would avoid Plavix for the time being  Lifelong aspirin and minimum 1 year dual antiplatelet therapy with Brilinta 90 mg twice daily  Continue high intensity statin, metoprolol and isosorbide  2D echocardiogram pending to reassess cardiac structure and function post MI  ECGs as needed chest pain  Cardiac rehabilitation at discharge  Reasonable for downgrade to telemetry today with probable discharge tomorrow post echocardiogram    Signed: Malcolm Garcia DO, [facility], JULIA MACHADO, FACP      History of Present Illness:  Patient seen and examined.  Denies any chest pain, pressure, tightness or squeezing.  Denies lightheadedness, or palpitations.  Denies lower extremity swelling, orthopnea or paroxysmal nocturnal dyspnea.  Patient feels improved today.  No acute events overnight.      Review of Systems:  Review of Systems   Constitutional: Negative for decreased appetite, fever, weight gain and weight loss.   HENT:  Negative for congestion and sore throat.    Eyes:  Negative for visual disturbance.   Cardiovascular:  Negative for chest pain, dyspnea on exertion, leg swelling,  near-syncope and palpitations.   Respiratory:  Negative for cough and shortness of breath.    Hematologic/Lymphatic: Negative for bleeding problem.   Skin:  Negative for rash.   Musculoskeletal:  Negative for myalgias and neck pain.   Gastrointestinal:  Negative for abdominal pain and nausea.   Neurological:  Negative for light-headedness and weakness.   Psychiatric/Behavioral:  Negative for depression.        No Known Allergies    No current facility-administered medications on file prior to encounter.     Current Outpatient Medications on File Prior to Encounter   Medication Sig    albuterol (PROVENTIL HFA,VENTOLIN HFA) 90 mcg/act inhaler Inhale 1 puff every 6 (six) hours as needed for wheezing    amLODIPine (NORVASC) 10 mg tablet Take 1 tablet (10 mg total) by mouth daily after dinner    aspirin 81 mg chewable tablet Chew 1 tablet (81 mg total) daily    atorvastatin (LIPITOR) 80 mg tablet Take 1 tablet (80 mg total) by mouth daily with dinner    Blood Pressure Monitoring (Chat [employer] Choice BP Monitor/Arm) KELLY Use daily as needed (dizziness)    clopidogrel (PLAVIX) 75 mg tablet Take 1 tablet (75 mg total) by mouth daily Do not start before January 18, 2025.    fluticasone-salmeterol (Advair HFA) 230-21 MCG/ACT inhaler Inhale 2 puffs 2 (two) times a day Rinse mouth after use.    isosorbide mononitrate (IMDUR) 60 mg 24 hr tablet Take 1 tablet (60 mg total) by mouth Daily at 2am Do not start before January 18, 2025.    losartan (COZAAR) 50 mg tablet Take 1 tablet (50 mg total) by mouth daily after lunch    metFORMIN (GLUCOPHAGE-XR) 500 mg 24 hr tablet Take 1 tablet (500 mg total) by mouth daily with dinner    metoprolol tartrate (LOPRESSOR) 50 mg tablet Take 1 tablet (50 mg total) by mouth every 12 (twelve) hours    spironolactone (ALDACTONE) 50 mg tablet Take 1 tablet (50 mg total) by mouth daily Do not start before January 18, 2025.        Current Facility-Administered Medications   Medication Dose Route Frequency  Provider Last Rate    aspirin  81 mg Oral Daily MAREN Zaman      atorvastatin  80 mg Oral Daily With Dinner MAREN Zaman      chlorhexidine  15 mL Mouth[facility]Throat Q12H [facility] MAREN Zaman      enoxaparin  40 mg Subcutaneous Daily Joan Myers, MAREN      insulin lispro  1-6 Units Subcutaneous TID  Joan Myers, MARNE      isosorbide mononitrate  30 mg Oral Daily Joan Myers, MAREN      lidocaine  1 patch Topical Daily Joan Myers, MAREN      metoprolol tartrate  25 mg Oral Q12H [facility] Joan Myers, MAREN      morphine injection  2 mg Intravenous Q3H PRN MAREN Zaman      sodium chloride (PF)  3 mL Intravenous Q1H PRN MAREN Zaman      ticagrelor  90 mg Oral Q12H [facility] MAREN Zaman              Vitals:    01/25/25 0318 01/25/25 0330 01/25/25 0600 01/25/25 0700   BP: 132/75      BP Location:       Pulse:    100   Resp:    22   Temp:  99.2 °F (37.3 °C)  98.8 °F (37.1 °C)   TempSrc:  Oral  Oral   SpO2:    99%   Weight:   89.6 kg (197 lb 8.5 oz)    Height:         Body mass index is 31.88 kg/m².      Intake/Output Summary (Last 24 hours) at 1/25/2025 0807  Last data filed at 1/25/2025 0730  Gross per 24 hour   Intake 360 ml   Output 550 ml   Net -190 ml       Weight change: -2.4 kg (-5 lb 4.7 oz)    PHYSICAL EXAMINATION:  Gen: Awake, Alert, NAD  Head/eyes: AT/NC, pupils equal and round, Anicteric  ENT: mmm  Neck: Supple, No elevated JVP, trachea midline  Resp: CTA bilaterally no w/r/r  CV: RRR +S1, S2, No m/r/g  Abd: Soft, NT/ND + BS  Ext: no LE edema bilaterally cath site c/d/i  Neuro: Follows commands, moves all extermities  Psych: Appropriate affect, normal mood, pleasant attitude, non-combative  Skin: warm; no rash, erythema or venous stasis changes on exposed skin    Lab Results:  Results from last 7 days   Lab Units 01/25/25  0542   WBC Thousand/uL 14.26*   HEMOGLOBIN g/dL 13.1   HEMATOCRIT % 39.3   PLATELETS Thousands/uL 263     Results from last 7 days  "  Lab Units 01/25/25  0542   POTASSIUM mmol/L 3.9   CHLORIDE mmol/L 106   CO2 mmol/L 29   BUN mg/dL 25   CREATININE mg/dL 0.93   CALCIUM mg/dL 8.5   ALK PHOS U/L 69   ALT U/L 102*   AST U/L 336*     No results found for: \"TROPONINT\"      Results from last 7 days   Lab Units 01/24/25  1114 01/24/25  0908 01/24/25  0646   HS TNI 0HR ng/L  --   --  9   HS TNI 2HR ng/L  --  >22,973*  --    HS TNI 4HR ng/L >22,973*  --   --      Results from last 7 days   Lab Units 01/24/25  0917   INR  1.28*       Tele: SR huddleston/ Al    This note was completed in part utilizing M-Modal Fluency Direct Software.  Grammatical errors, random word insertions, spelling mistakes, and incomplete sentences may be an occasional consequence of this system secondary to software limitations, ambient noise, and hardware issues.  If you have any questions or concerns about the content, text, or information contained within the body of this dictation, please contact the provider for clarification.      "

## 2025-01-25 NOTE — PLAN OF CARE
Problem: Prexisting or High Potential for Compromised Skin Integrity  Goal: Skin integrity is maintained or improved  Description: INTERVENTIONS:  - Identify patients at risk for skin breakdown  - Assess and monitor skin integrity  - Assess and monitor nutrition and hydration status  - Monitor labs   - Assess for incontinence   - Turn and reposition patient  - Assist with mobility/ambulation  - Relieve pressure over bony prominences  - Avoid friction and shearing  - Provide appropriate hygiene as needed including keeping skin clean and dry  - Evaluate need for skin moisturizer/barrier cream  - Collaborate with interdisciplinary team   - Patient/family teaching  - Consider wound care consult   Outcome: Progressing     Problem: Nutrition/Hydration-ADULT  Goal: Nutrient/Hydration intake appropriate for improving, restoring or maintaining nutritional needs  Description: Monitor and assess patient's nutrition/hydration status for malnutrition. Collaborate with interdisciplinary team and initiate plan and interventions as ordered.  Monitor patient's weight and dietary intake as ordered or per policy. Utilize nutrition screening tool and intervene as necessary. Determine patient's food preferences and provide high-protein, high-caloric foods as appropriate.     INTERVENTIONS:  - Monitor oral intake, urinary output, labs, and treatment plans  - Assess nutrition and hydration status and recommend course of action  - Evaluate amount of meals eaten  - Assist patient with eating if necessary   - Allow adequate time for meals  - Recommend/ encourage appropriate diets, oral nutritional supplements, and vitamin/mineral supplements  - Order, calculate, and assess calorie counts as needed  - Recommend, monitor, and adjust tube feedings and TPN/PPN based on assessed needs  - Assess need for intravenous fluids  - Provide specific nutrition/hydration education as appropriate  - Include patient/family/caregiver in decisions related to  nutrition  Outcome: Progressing     Problem: PAIN - ADULT  Goal: Verbalizes/displays adequate comfort level or baseline comfort level  Description: Interventions:  - Encourage patient to monitor pain and request assistance  - Assess pain using appropriate pain scale  - Administer analgesics based on type and severity of pain and evaluate response  - Implement non-pharmacological measures as appropriate and evaluate response  - Consider cultural and social influences on pain and pain management  - Notify physician/advanced practitioner if interventions unsuccessful or patient reports new pain  Outcome: Progressing     Problem: INFECTION - ADULT  Goal: Absence or prevention of progression during hospitalization  Description: INTERVENTIONS:  - Assess and monitor for signs and symptoms of infection  - Monitor lab/diagnostic results  - Monitor all insertion sites, i.e. indwelling lines, tubes, and drains  - Monitor endotracheal if appropriate and nasal secretions for changes in amount and color  - [place] appropriate cooling/warming therapies per order  - Administer medications as ordered  - Instruct and encourage patient and family to use good hand hygiene technique  - Identify and instruct in appropriate isolation precautions for identified infection/condition  Outcome: Progressing  Goal: Absence of fever/infection during neutropenic period  Description: INTERVENTIONS:  - Monitor WBC    Outcome: Progressing     Problem: SAFETY ADULT  Goal: Patient will remain free of falls  Description: INTERVENTIONS:  - Educate patient/family on patient safety including physical limitations  - Instruct patient to call for assistance with activity   - Consult OT/PT to assist with strengthening/mobility   - Keep Call bell within reach  - Keep bed low and locked with side rails adjusted as appropriate  - Keep care items and personal belongings within reach  - Initiate and maintain comfort rounds  - Make Fall Risk Sign visible to  staff  - Apply yellow socks and bracelet for high fall risk patients  - Consider moving patient to room near nurses station  Outcome: Progressing  Goal: Maintain or return to baseline ADL function  Description: INTERVENTIONS:  -  Assess patient's ability to carry out ADLs; assess patient's baseline for ADL function and identify physical deficits which impact ability to perform ADLs (bathing, care of mouth/teeth, toileting, grooming, dressing, etc.)  - Assess/evaluate cause of self-care deficits   - Assess range of motion  - Assess patient's mobility; develop plan if impaired  - Assess patient's need for assistive devices and provide as appropriate  - Encourage maximum independence but intervene and supervise when necessary  - Involve family in performance of ADLs  - Assess for home care needs following discharge   - Consider OT consult to assist with ADL evaluation and planning for discharge  - Provide patient education as appropriate  Outcome: Progressing  Goal: Maintains/Returns to pre admission functional level  Description: INTERVENTIONS:  - Perform AM-PAC 6 Click Basic Mobility/ Daily Activity assessment daily.  - Set and communicate daily mobility goal to care team and patient/family/caregiver.   - Collaborate with rehabilitation services on mobility goals if consulted  - Out of bed for toileting  - Record patient progress and toleration of activity level   Outcome: Progressing     Problem: DISCHARGE PLANNING  Goal: Discharge to home or other facility with appropriate resources  Description: INTERVENTIONS:  - Identify barriers to discharge w/patient and caregiver  - Arrange for needed discharge resources and transportation as appropriate  - Identify discharge learning needs (meds, wound care, etc.)  - Arrange for interpretive services to assist at discharge as needed  - Refer to Case Management Department for coordinating discharge planning if the patient needs post-hospital services based on  physician/advanced practitioner order or complex needs related to functional status, cognitive ability, or social support system  Outcome: Progressing     Problem: Knowledge Deficit  Goal: Patient/family/caregiver demonstrates understanding of disease process, treatment plan, medications, and discharge instructions  Description: Complete learning assessment and assess knowledge base.  Interventions:  - Provide teaching at level of understanding  - Provide teaching via preferred learning methods  Outcome: Progressing     Problem: DISCHARGE PLANNING - CARE MANAGEMENT  Goal: Discharge to post-acute care or home with appropriate resources  Description: INTERVENTIONS:  - Conduct assessment to determine patient/family and health care team treatment goals, and need for post-acute services based on payer coverage, community resources, and patient preferences, and barriers to discharge  - Address psychosocial, clinical, and financial barriers to discharge as identified in assessment in conjunction with the patient/family and health care team  - Arrange appropriate level of post-acute services according to patient’s   needs and preference and payer coverage in collaboration with the physician and health care team  - Communicate with and update the patient/family, physician, and health care team regarding progress on the discharge plan  - Arrange appropriate transportation to post-acute venues  Outcome: Progressing

## 2025-01-26 ENCOUNTER — APPOINTMENT (INPATIENT)
Dept: NON INVASIVE DIAGNOSTICS | Facility: HOSPITAL | Age: 60
DRG: 175 | End: 2025-01-26
Payer: COMMERCIAL

## 2025-01-26 VITALS
HEIGHT: 66 IN | WEIGHT: 196 LBS | TEMPERATURE: 99 F | BODY MASS INDEX: 31.5 KG/M2 | OXYGEN SATURATION: 97 % | SYSTOLIC BLOOD PRESSURE: 141 MMHG | HEART RATE: 96 BPM | RESPIRATION RATE: 30 BRPM | DIASTOLIC BLOOD PRESSURE: 88 MMHG

## 2025-01-26 LAB
ANION GAP SERPL CALCULATED.3IONS-SCNC: 4 MMOL/L (ref 4–13)
BASOPHILS # BLD AUTO: 0.02 THOUSANDS/ΜL (ref 0–0.1)
BASOPHILS NFR BLD AUTO: 0 % (ref 0–1)
BSA FOR ECHO PROCEDURE: 1.98 M2
BSA FOR ECHO PROCEDURE: 1.99 M2
BUN SERPL-MCNC: 24 MG/DL (ref 5–25)
CALCIUM SERPL-MCNC: 8.5 MG/DL (ref 8.4–10.2)
CHLORIDE SERPL-SCNC: 106 MMOL/L (ref 96–108)
CO2 SERPL-SCNC: 29 MMOL/L (ref 21–32)
CREAT SERPL-MCNC: 0.91 MG/DL (ref 0.6–1.3)
EOSINOPHIL # BLD AUTO: 0.08 THOUSAND/ΜL (ref 0–0.61)
EOSINOPHIL NFR BLD AUTO: 1 % (ref 0–6)
ERYTHROCYTE [DISTWIDTH] IN BLOOD BY AUTOMATED COUNT: 14.5 % (ref 11.6–15.1)
GFR SERPL CREATININE-BSD FRML MDRD: 90 ML/MIN/1.73SQ M
GLUCOSE SERPL-MCNC: 113 MG/DL (ref 65–140)
GLUCOSE SERPL-MCNC: 124 MG/DL (ref 65–140)
GLUCOSE SERPL-MCNC: 149 MG/DL (ref 65–140)
HCT VFR BLD AUTO: 39.9 % (ref 36.5–49.3)
HGB BLD-MCNC: 13.2 G/DL (ref 12–17)
IMM GRANULOCYTES # BLD AUTO: 0.05 THOUSAND/UL (ref 0–0.2)
IMM GRANULOCYTES NFR BLD AUTO: 1 % (ref 0–2)
LEFT VENTRICLE DIASTOLIC VOLUME (MOD BIPLANE): 100 ML
LEFT VENTRICLE DIASTOLIC VOLUME INDEX (MOD BIPLANE): 50.3 ML/M2
LEFT VENTRICLE SYSTOLIC VOLUME (MOD BIPLANE): 59 ML
LEFT VENTRICLE SYSTOLIC VOLUME INDEX (MOD BIPLANE): 29.6 ML/M2
LV EF BIPLANE MOD: 41 %
LV EF US.2D.A4C+ESTIMATED: 47 %
LV EF US.2D.A4C+ESTIMATED: 51 %
LYMPHOCYTES # BLD AUTO: 2.12 THOUSANDS/ΜL (ref 0.6–4.47)
LYMPHOCYTES NFR BLD AUTO: 21 % (ref 14–44)
MAGNESIUM SERPL-MCNC: 1.9 MG/DL (ref 1.9–2.7)
MCH RBC QN AUTO: 28.1 PG (ref 26.8–34.3)
MCHC RBC AUTO-ENTMCNC: 33.1 G/DL (ref 31.4–37.4)
MCV RBC AUTO: 85 FL (ref 82–98)
MONOCYTES # BLD AUTO: 1.32 THOUSAND/ΜL (ref 0.17–1.22)
MONOCYTES NFR BLD AUTO: 13 % (ref 4–12)
NEUTROPHILS # BLD AUTO: 6.56 THOUSANDS/ΜL (ref 1.85–7.62)
NEUTS SEG NFR BLD AUTO: 64 % (ref 43–75)
NRBC BLD AUTO-RTO: 0 /100 WBCS
PLATELET # BLD AUTO: 261 THOUSANDS/UL (ref 149–390)
PMV BLD AUTO: 11.1 FL (ref 8.9–12.7)
POTASSIUM SERPL-SCNC: 3.4 MMOL/L (ref 3.5–5.3)
RBC # BLD AUTO: 4.7 MILLION/UL (ref 3.88–5.62)
SL CV LV EF: 50
SL CV LV EF: 50
SODIUM SERPL-SCNC: 139 MMOL/L (ref 135–147)
WBC # BLD AUTO: 10.15 THOUSAND/UL (ref 4.31–10.16)

## 2025-01-26 PROCEDURE — 82948 REAGENT STRIP/BLOOD GLUCOSE: CPT

## 2025-01-26 PROCEDURE — 85025 COMPLETE CBC W/AUTO DIFF WBC: CPT

## 2025-01-26 PROCEDURE — 93308 TTE F-UP OR LMTD: CPT | Performed by: INTERNAL MEDICINE

## 2025-01-26 PROCEDURE — 93308 TTE F-UP OR LMTD: CPT

## 2025-01-26 PROCEDURE — 80048 BASIC METABOLIC PNL TOTAL CA: CPT

## 2025-01-26 PROCEDURE — 99232 SBSQ HOSP IP/OBS MODERATE 35: CPT | Performed by: INTERNAL MEDICINE

## 2025-01-26 PROCEDURE — 83735 ASSAY OF MAGNESIUM: CPT

## 2025-01-26 PROCEDURE — 99239 HOSP IP/OBS DSCHRG MGMT >30: CPT | Performed by: INTERNAL MEDICINE

## 2025-01-26 RX ORDER — ISOSORBIDE MONONITRATE 30 MG/1
30 TABLET, EXTENDED RELEASE ORAL DAILY
Qty: 30 TABLET | Refills: 0 | Status: SHIPPED | OUTPATIENT
Start: 2025-01-27 | End: 2025-01-30 | Stop reason: SDUPTHER

## 2025-01-26 RX ORDER — ATORVASTATIN CALCIUM 80 MG/1
80 TABLET, FILM COATED ORAL
Qty: 30 TABLET | Refills: 0 | Status: SHIPPED | OUTPATIENT
Start: 2025-01-26 | End: 2025-01-30 | Stop reason: SDUPTHER

## 2025-01-26 RX ORDER — METOPROLOL TARTRATE 25 MG/1
25 TABLET, FILM COATED ORAL EVERY 12 HOURS SCHEDULED
Qty: 60 TABLET | Refills: 0 | Status: SHIPPED | OUTPATIENT
Start: 2025-01-26 | End: 2025-01-29

## 2025-01-26 RX ORDER — ASPIRIN 81 MG/1
81 TABLET, CHEWABLE ORAL DAILY
Qty: 30 TABLET | Refills: 0 | Status: SHIPPED | OUTPATIENT
Start: 2025-01-26 | End: 2025-01-30 | Stop reason: SDUPTHER

## 2025-01-26 RX ADMIN — METOPROLOL TARTRATE 25 MG: 25 TABLET, FILM COATED ORAL at 09:12

## 2025-01-26 RX ADMIN — ENOXAPARIN SODIUM 40 MG: 40 INJECTION SUBCUTANEOUS at 09:12

## 2025-01-26 RX ADMIN — TICAGRELOR 90 MG: 90 TABLET ORAL at 09:12

## 2025-01-26 RX ADMIN — PERFLUTREN 0.4 ML/MIN: 6.52 INJECTION, SUSPENSION INTRAVENOUS at 10:40

## 2025-01-26 RX ADMIN — ISOSORBIDE MONONITRATE 30 MG: 30 TABLET, EXTENDED RELEASE ORAL at 09:12

## 2025-01-26 RX ADMIN — ASPIRIN 81 MG: 81 TABLET, COATED ORAL at 09:12

## 2025-01-26 NOTE — ASSESSMENT & PLAN NOTE
STEMI due to in-stent thrombosis   In-stent thrombosis due to possible Plavix resistance  He was just admitted earlier this month for an NSTEMI due to a 95% stenosed proximal LAD.  Status post PCI with drug-eluting stent placed to the proximal LAD  Clinically stable.  Currently on aspirin, Lipitor, Imdur, Metoprolol and Brilinta

## 2025-01-26 NOTE — ASSESSMENT & PLAN NOTE
See above  Case management will need to be involved with  his medications.  He is now on Brilinta due to possible Plavix resistance.

## 2025-01-26 NOTE — PLAN OF CARE
Problem: Prexisting or High Potential for Compromised Skin Integrity  Goal: Skin integrity is maintained or improved  Description: INTERVENTIONS:  - Identify patients at risk for skin breakdown  - Assess and monitor skin integrity  - Assess and monitor nutrition and hydration status  - Monitor labs   - Assess for incontinence   - Turn and reposition patient  - Assist with mobility/ambulation  - Relieve pressure over bony prominences  - Avoid friction and shearing  - Provide appropriate hygiene as needed including keeping skin clean and dry  - Evaluate need for skin moisturizer/barrier cream  - Collaborate with interdisciplinary team   - Patient/family teaching  - Consider wound care consult   Outcome: Progressing     Problem: Nutrition/Hydration-ADULT  Goal: Nutrient/Hydration intake appropriate for improving, restoring or maintaining nutritional needs  Description: Monitor and assess patient's nutrition/hydration status for malnutrition. Collaborate with interdisciplinary team and initiate plan and interventions as ordered.  Monitor patient's weight and dietary intake as ordered or per policy. Utilize nutrition screening tool and intervene as necessary. Determine patient's food preferences and provide high-protein, high-caloric foods as appropriate.     INTERVENTIONS:  - Monitor oral intake, urinary output, labs, and treatment plans  - Assess nutrition and hydration status and recommend course of action  - Evaluate amount of meals eaten  - Assist patient with eating if necessary   - Allow adequate time for meals  - Recommend/ encourage appropriate diets, oral nutritional supplements, and vitamin/mineral supplements  - Order, calculate, and assess calorie counts as needed  - Recommend, monitor, and adjust tube feedings and TPN/PPN based on assessed needs  - Assess need for intravenous fluids  - Provide specific nutrition/hydration education as appropriate  - Include patient/family/caregiver in decisions related to  nutrition  Outcome: Progressing     Problem: PAIN - ADULT  Goal: Verbalizes/displays adequate comfort level or baseline comfort level  Description: Interventions:  - Encourage patient to monitor pain and request assistance  - Assess pain using appropriate pain scale  - Administer analgesics based on type and severity of pain and evaluate response  - Implement non-pharmacological measures as appropriate and evaluate response  - Consider cultural and social influences on pain and pain management  - Notify physician/advanced practitioner if interventions unsuccessful or patient reports new pain  Outcome: Progressing     Problem: INFECTION - ADULT  Goal: Absence or prevention of progression during hospitalization  Description: INTERVENTIONS:  - Assess and monitor for signs and symptoms of infection  - Monitor lab/diagnostic results  - Monitor all insertion sites, i.e. indwelling lines, tubes, and drains  - Monitor endotracheal if appropriate and nasal secretions for changes in amount and color  - [place] appropriate cooling/warming therapies per order  - Administer medications as ordered  - Instruct and encourage patient and family to use good hand hygiene technique  - Identify and instruct in appropriate isolation precautions for identified infection/condition  Outcome: Progressing  Goal: Absence of fever/infection during neutropenic period  Description: INTERVENTIONS:  - Monitor WBC    Outcome: Progressing     Problem: SAFETY ADULT  Goal: Patient will remain free of falls  Description: INTERVENTIONS:  - Educate patient/family on patient safety including physical limitations  - Instruct patient to call for assistance with activity   - Consult OT/PT to assist with strengthening/mobility   - Keep Call bell within reach  - Keep bed low and locked with side rails adjusted as appropriate  - Keep care items and personal belongings within reach  - Initiate and maintain comfort rounds  - Make Fall Risk Sign visible to  staff  - Apply yellow socks and bracelet for high fall risk patients  - Consider moving patient to room near nurses station  Outcome: Progressing  Goal: Maintain or return to baseline ADL function  Description: INTERVENTIONS:  -  Assess patient's ability to carry out ADLs; assess patient's baseline for ADL function and identify physical deficits which impact ability to perform ADLs (bathing, care of mouth/teeth, toileting, grooming, dressing, etc.)  - Assess/evaluate cause of self-care deficits   - Assess range of motion  - Assess patient's mobility; develop plan if impaired  - Assess patient's need for assistive devices and provide as appropriate  - Encourage maximum independence but intervene and supervise when necessary  - Involve family in performance of ADLs  - Assess for home care needs following discharge   - Consider OT consult to assist with ADL evaluation and planning for discharge  - Provide patient education as appropriate  Outcome: Progressing  Goal: Maintains/Returns to pre admission functional level  Description: INTERVENTIONS:  - Perform AM-PAC 6 Click Basic Mobility/ Daily Activity assessment daily.  - Set and communicate daily mobility goal to care team and patient/family/caregiver.   - Collaborate with rehabilitation services on mobility goals if consulted  - Out of bed for toileting  - Record patient progress and toleration of activity level   Outcome: Progressing     Problem: DISCHARGE PLANNING  Goal: Discharge to home or other facility with appropriate resources  Description: INTERVENTIONS:  - Identify barriers to discharge w/patient and caregiver  - Arrange for needed discharge resources and transportation as appropriate  - Identify discharge learning needs (meds, wound care, etc.)  - Arrange for interpretive services to assist at discharge as needed  - Refer to Case Management Department for coordinating discharge planning if the patient needs post-hospital services based on  physician/advanced practitioner order or complex needs related to functional status, cognitive ability, or social support system  Outcome: Progressing

## 2025-01-26 NOTE — PLAN OF CARE
Problem: Prexisting or High Potential for Compromised Skin Integrity  Goal: Skin integrity is maintained or improved  Description: INTERVENTIONS:  - Identify patients at risk for skin breakdown  - Assess and monitor skin integrity  - Assess and monitor nutrition and hydration status  - Monitor labs   - Assess for incontinence   - Turn and reposition patient  - Assist with mobility/ambulation  - Relieve pressure over bony prominences  - Avoid friction and shearing  - Provide appropriate hygiene as needed including keeping skin clean and dry  - Evaluate need for skin moisturizer/barrier cream  - Collaborate with interdisciplinary team   - Patient/family teaching  - Consider wound care consult   Outcome: Progressing     Problem: Nutrition/Hydration-ADULT  Goal: Nutrient/Hydration intake appropriate for improving, restoring or maintaining nutritional needs  Description: Monitor and assess patient's nutrition/hydration status for malnutrition. Collaborate with interdisciplinary team and initiate plan and interventions as ordered.  Monitor patient's weight and dietary intake as ordered or per policy. Utilize nutrition screening tool and intervene as necessary. Determine patient's food preferences and provide high-protein, high-caloric foods as appropriate.     INTERVENTIONS:  - Monitor oral intake, urinary output, labs, and treatment plans  - Assess nutrition and hydration status and recommend course of action  - Evaluate amount of meals eaten  - Assist patient with eating if necessary   - Allow adequate time for meals  - Recommend/ encourage appropriate diets, oral nutritional supplements, and vitamin/mineral supplements  - Order, calculate, and assess calorie counts as needed  - Recommend, monitor, and adjust tube feedings and TPN/PPN based on assessed needs  - Assess need for intravenous fluids  - Provide specific nutrition/hydration education as appropriate  - Include patient/family/caregiver in decisions related to  nutrition  Outcome: Progressing     Problem: PAIN - ADULT  Goal: Verbalizes/displays adequate comfort level or baseline comfort level  Description: Interventions:  - Encourage patient to monitor pain and request assistance  - Assess pain using appropriate pain scale  - Administer analgesics based on type and severity of pain and evaluate response  - Implement non-pharmacological measures as appropriate and evaluate response  - Consider cultural and social influences on pain and pain management  - Notify physician/advanced practitioner if interventions unsuccessful or patient reports new pain  Outcome: Progressing     Problem: INFECTION - ADULT  Goal: Absence or prevention of progression during hospitalization  Description: INTERVENTIONS:  - Assess and monitor for signs and symptoms of infection  - Monitor lab/diagnostic results  - Monitor all insertion sites, i.e. indwelling lines, tubes, and drains  - Monitor endotracheal if appropriate and nasal secretions for changes in amount and color  - [place] appropriate cooling/warming therapies per order  - Administer medications as ordered  - Instruct and encourage patient and family to use good hand hygiene technique  - Identify and instruct in appropriate isolation precautions for identified infection/condition  Outcome: Progressing  Goal: Absence of fever/infection during neutropenic period  Description: INTERVENTIONS:  - Monitor WBC    Outcome: Progressing     Problem: SAFETY ADULT  Goal: Patient will remain free of falls  Description: INTERVENTIONS:  - Educate patient/family on patient safety including physical limitations  - Instruct patient to call for assistance with activity   - Consult OT/PT to assist with strengthening/mobility   - Keep Call bell within reach  - Keep bed low and locked with side rails adjusted as appropriate  - Keep care items and personal belongings within reach  - Initiate and maintain comfort rounds  - Make Fall Risk Sign visible to  staff  - Offer Toileting every 2 Hours, in advance of need  - Initiate/Maintain bed alarm  - Obtain necessary fall risk management equipment: bed alarm, rails  - Apply yellow socks and bracelet for high fall risk patients  - Consider moving patient to room near nurses station  Outcome: Progressing  Goal: Maintain or return to baseline ADL function  Description: INTERVENTIONS:  -  Assess patient's ability to carry out ADLs; assess patient's baseline for ADL function and identify physical deficits which impact ability to perform ADLs (bathing, care of mouth/teeth, toileting, grooming, dressing, etc.)  - Assess/evaluate cause of self-care deficits   - Assess range of motion  - Assess patient's mobility; develop plan if impaired  - Assess patient's need for assistive devices and provide as appropriate  - Encourage maximum independence but intervene and supervise when necessary  - Involve family in performance of ADLs  - Assess for home care needs following discharge   - Consider OT consult to assist with ADL evaluation and planning for discharge  - Provide patient education as appropriate  Outcome: Progressing  Goal: Maintains/Returns to pre admission functional level  Description: INTERVENTIONS:  - Perform AM-PAC 6 Click Basic Mobility/ Daily Activity assessment daily.  - Set and communicate daily mobility goal to care team and patient/family/caregiver.   - Collaborate with rehabilitation services on mobility goals if consulted  - Perform Range of Motion 2 times a day.  - Reposition patient every 2 hours.  - Dangle patient 2 times a day  - Stand patient 2 times a day  - Ambulate patient 2 times a day  - Out of bed to chair 2 times a day   - Out of bed for meals 2 times a day  - Out of bed for toileting  - Record patient progress and toleration of activity level   Outcome: Progressing     Problem: DISCHARGE PLANNING  Goal: Discharge to home or other facility with appropriate resources  Description: INTERVENTIONS:  -  Identify barriers to discharge w/patient and caregiver  - Arrange for needed discharge resources and transportation as appropriate  - Identify discharge learning needs (meds, wound care, etc.)  - Arrange for interpretive services to assist at discharge as needed  - Refer to Case Management Department for coordinating discharge planning if the patient needs post-hospital services based on physician/advanced practitioner order or complex needs related to functional status, cognitive ability, or social support system  Outcome: Progressing     Problem: Knowledge Deficit  Goal: Patient/family/caregiver demonstrates understanding of disease process, treatment plan, medications, and discharge instructions  Description: Complete learning assessment and assess knowledge base.  Interventions:  - Provide teaching at level of understanding  - Provide teaching via preferred learning methods  Outcome: Progressing     Problem: DISCHARGE PLANNING - CARE MANAGEMENT  Goal: Discharge to post-acute care or home with appropriate resources  Description: INTERVENTIONS:  - Conduct assessment to determine patient/family and health care team treatment goals, and need for post-acute services based on payer coverage, community resources, and patient preferences, and barriers to discharge  - Address psychosocial, clinical, and financial barriers to discharge as identified in assessment in conjunction with the patient/family and health care team  - Arrange appropriate level of post-acute services according to patient’s   needs and preference and payer coverage in collaboration with the physician and health care team  - Communicate with and update the patient/family, physician, and health care team regarding progress on the discharge plan  - Arrange appropriate transportation to post-acute venues  Outcome: Progressing

## 2025-01-26 NOTE — PROGRESS NOTES
Progress Note - Hospitalist   Name: Segun Grissom 59 y.o. male I MRN: [id number]  Unit/Bed#: ICU 02 I Date of Admission: 1/24/2025   Date of Service: 1/26/2025 I Hospital Day: 2    Assessment & Plan  STEMI involving left anterior descending coronary artery (HCC)  STEMI due to in-stent thrombosis   In-stent thrombosis due to possible Plavix resistance  He was just admitted earlier this month for an NSTEMI due to a 95% stenosed proximal LAD.  Status post PCI with drug-eluting stent placed to the proximal LAD  Clinically stable.  Currently on aspirin, Lipitor, Imdur, Metoprolol and Brilinta  Coronary artery disease involving native coronary artery of native heart  See above  Case management will need to be involved with  his medications.  He is now on Brilinta due to possible Plavix resistance.  Primary hypertension  Continue isosorbide 30 mg daily, metoprolol 25 mg twice daily with hold parameters  Type 2 diabetes mellitus, without long-term current use of insulin ([facility])  Lab Results   Component Value Date    HGBA1C 6.7 (H) 01/14/2025   Continue diabetic diet and sliding scale  A1c at goal    VTE Pharmacologic Prophylaxis:   lovenox  Patient Centered Rounds: Discussed with his ICU nurse  Discussions with Specialists or Other Care Team Provider: Discussed with Dr. Casas  Education and Discussions with Family / Patient: Updated  (wife) via phone.    Current Length of Stay: 2 day(s)  Current Patient Status: Inpatient   Certification Statement: The patient will continue to require additional inpatient hospital stay due to stemi  Discharge Plan: Medically stabel for DC once we are sure that he is able to get/afford brilinta on DC.    Code Status: Level 1 - Full Code    Subjective   Seen and examined this morning during rounds.  He denies any chest pain or shortness of breath  He feels well.    Objective :  Temp:  [98.4 °F (36.9 °C)-99.9 °F (37.7 °C)] 98.4 °F (36.9 °C)  HR:  [] 84  BP:  (122-145)/(65-79) 135/79  Resp:  [20-31] 24  SpO2:  [95 %-98 %] 97 %  O2 Device: None (Room air)    Body mass index is 31.78 kg/m².     Input and Output Summary (last 24 hours):     Intake/Output Summary (Last 24 hours) at 1/26/2025 0852  Last data filed at 1/25/2025 1746  Gross per 24 hour   Intake 480 ml   Output --   Net 480 ml       Physical Exam  Vitals reviewed.   HENT:      Head: Normocephalic and atraumatic.      Nose: No congestion or rhinorrhea.   Eyes:      General: No scleral icterus.  Cardiovascular:      Rate and Rhythm: Normal rate and regular rhythm.   Pulmonary:      Breath sounds: No wheezing or rhonchi.   Abdominal:      Palpations: Abdomen is soft.      Tenderness: There is no abdominal tenderness. There is no guarding.   Musculoskeletal:      Right lower leg: No edema.      Left lower leg: No edema.   Skin:     General: Skin is warm and dry.   Neurological:      Mental Status: He is oriented to person, place, and time.   Psychiatric:         Mood and Affect: Mood normal.         Behavior: Behavior normal.                  Lab Results: I have reviewed the following results:   Results from last 7 days   Lab Units 01/26/25  0530   WBC Thousand/uL 10.15   HEMOGLOBIN g/dL 13.2   HEMATOCRIT % 39.9   PLATELETS Thousands/uL 261   SEGS PCT % 64   LYMPHO PCT % 21   MONO PCT % 13*   EOS PCT % 1     Results from last 7 days   Lab Units 01/26/25  0530 01/25/25  0542   SODIUM mmol/L 139 137   POTASSIUM mmol/L 3.4* 3.9   CHLORIDE mmol/L 106 106   CO2 mmol/L 29 29   BUN mg/dL 24 25   CREATININE mg/dL 0.91 0.93   ANION GAP mmol/L 4 2*   CALCIUM mg/dL 8.5 8.5   ALBUMIN g/dL  --  3.7   TOTAL BILIRUBIN mg/dL  --  0.45   ALK PHOS U/L  --  69   ALT U/L  --  102*   AST U/L  --  336*   GLUCOSE RANDOM mg/dL 124 145*     Results from last 7 days   Lab Units 01/24/25  0917   INR  1.28*     Results from last 7 days   Lab Units 01/26/25  0713 01/25/25  1652 01/25/25  1106 01/25/25  0715 01/24/25  2218   POC GLUCOSE mg/dl  113 135 146* 117 145*               Recent Cultures (last 7 days):         Imaging Results Review: I reviewed radiology reports from this admission including: procedure reports.      Last 24 Hours Medication List:     Current Facility-Administered Medications:     aspirin (ECOTRIN LOW STRENGTH) EC tablet 81 mg, Daily    atorvastatin (LIPITOR) tablet 80 mg, Daily With Dinner    enoxaparin (LOVENOX) subcutaneous injection 40 mg, Daily    insulin lispro (HumALOG/ADMELOG) 100 units/mL subcutaneous injection 1-6 Units, TID AC **AND** Fingerstick Glucose (POCT), TID AC    isosorbide mononitrate (IMDUR) 24 hr tablet 30 mg, Daily    lidocaine (LIDODERM) 5 % patch 1 patch, Daily    metoprolol tartrate (LOPRESSOR) tablet 25 mg, Q12H ARIEL    Insert peripheral IV, Once **AND** sodium chloride (PF) 0.9 % injection 3 mL, Q1H PRN    ticagrelor (BRILINTA) tablet 90 mg, Q12H ARIEL    Administrative Statements   Today, Patient Was Seen By: Gerson Huizar MD      **Please Note: This note may have been constructed using a voice recognition system.**

## 2025-01-26 NOTE — CASE MANAGEMENT
Case Management Discharge Planning Note    Patient name Segun Grissom  Location ICU ICU  MRN [id number]  : 1965 Date 2025       Current Admission Date: 2025  Current Admission Diagnosis:STEMI involving left anterior descending coronary artery (HCC)   Patient Active Problem List    Diagnosis Date Noted Date Diagnosed    Coronary stent thrombosis 2025     Coronary artery disease involving native coronary artery of native heart 2025     Possible Plavix resistance 2025     STEMI involving left anterior descending coronary artery (HCC) 01/15/2025     Type 2 diabetes mellitus, without long-term current use of insulin ([facility]) 01/15/2025     Body mass index (BMI) 32.0-32.9, adult 03/15/2023     Acquired trigger finger 2023     Carpal tunnel syndrome 2023     Pain in right hand 2023     CPAP (continuous positive airway pressure) dependence 2022     Moderate obstructive sleep apnea 2021     Rash 2021     Loud snoring 2021     Asthma 2021     Tinnitus of both ears 2021     Allergic rhinitis 2021     Other male erectile dysfunction 2021     Dyslipidemia 2014     Primary hypertension 2014     Chest pain 2014     Arthritis 2014     Chronic nasal congestion 2014     Vertigo 2014       LOS (days): 2  Geometric Mean LOS (GMLOS) (days):   Days to GMLOS:     OBJECTIVE:  Risk of Unplanned Readmission Score: 13.04         Current admission status: Inpatient   Preferred Pharmacy:   [facility] [place] [place] - [address]   [address] [facility] [address]  Phone: [phone] Fax: [phone]    [facility] [place] - [address]  [facility],  [address]  [facility],  [address] [facility] [address]  Phone: [phone] Fax: [phone]    Primary Care Provider: MAREN Ramirez    Primary Insurance: [employer]ALK[employer] PENDING  Secondary Insurance:      DISCHARGE DETAILS:                                          Other Referral/Resources/Interventions Provided:  Financial Resources Provided: Indigent Medication ($492.18)    Would you like to participate in our [facility] service program?  : Yes    Treatment Team Recommendation: Home  Discharge Destination Plan:: Home  Transport at Discharge : Auto with designated         Transported by (Company and Unit #): Family

## 2025-01-26 NOTE — ASSESSMENT & PLAN NOTE
Lab Results   Component Value Date    HGBA1C 6.7 (H) 01/14/2025   Continue diabetic diet and sliding scale  A1c at goal

## 2025-01-26 NOTE — DISCHARGE SUMMARY
Discharge Summary - Hospitalist   Name: Segun Grissom 59 y.o. male I MRN: [id number]  Unit/Bed#: ICU 02 I Date of Admission: 1/24/2025   Date of Service: 1/26/2025 I Hospital Day: 2     Assessment & Plan  STEMI involving left anterior descending coronary artery (HCC)  STEMI due to in-stent thrombosis   In-stent thrombosis due to possible Plavix resistance  He was just admitted earlier this month for an NSTEMI due to a 95% stenosed proximal LAD.  Status post PCI with drug-eluting stent placed to the proximal LAD  Clinically stable.  Currently on aspirin, Lipitor, Imdur, Metoprolol and Brilinta  Coronary artery disease involving native coronary artery of native heart  See above  Case management will need to be involved with  his medications.  He is now on Brilinta due to possible Plavix resistance.  Primary hypertension  Continue isosorbide 30 mg daily, metoprolol 25 mg twice daily with hold parameters  Type 2 diabetes mellitus, without long-term current use of insulin ([facility])  Lab Results   Component Value Date    HGBA1C 6.7 (H) 01/14/2025   Continue diabetic diet and sliding scale  A1c at goal     Medical Problems       Resolved Problems  Date Reviewed: 1/20/2025   None       Discharging Physician / Practitioner: Gerson Huizar MD  PCP: MAREN Ramirez  Admission Date:   Admission Orders (From admission, onward)       Ordered        01/24/25 1915  INPATIENT ADMISSION  Once                          Discharge Date: 01/26/25    Consultations During Hospital Stay:  Cardiology    Procedures Performed:   Cardiac catheterization    Significant Findings / Test Results:   Echo follow up/limited w/ contrast if indicated  Result Date: 1/26/2025  Narrative:   Left Ventricle: Left ventricular cavity size is normal. The left ventricular ejection fraction is 50% by visual estimation. Systolic function is low normal. There is moderate hypokinesis of the anteroseptal wall from mid to apex, anterior wall from  the mid to apex and apical wall.   Right Ventricle: Right ventricular cavity size is normal. Systolic function is normal.   Left Atrium: The atrium is dilated.   Mitral Valve: There is mild annular calcification.   Pericardium: There is a trivial pericardial effusion. There is no echocardiographic evidence of tamponade.   Compared to report from January 25, 2025, contra study was performed showing no evidence of apical thrombus.     Echo follow up/limited w/ contrast if indicated  Result Date: 1/26/2025  Narrative:   Left Ventricle: Left ventricular cavity size is normal. Wall thickness is increased. The left ventricular ejection fraction is 50% by visual estimation. Systolic function is low normal. There is mild hypokinesis of the anteroseptal wall from base to apex, moderate hypokinesis of the anterior wall from base to apex and severe apical hypokinesis.  There are no obvious echocardiographic indications of apical thrombus, but study is mildly limited.  Can consider echocardiogram with contrast to evaluate apex as clinically indicated.   Right Ventricle: Right ventricular cavity size is normal. Systolic function is normal.   Mitral Valve: There is mild annular calcification.   Compared to report from January 15, 2025, there are now regional wall motion abnormalities with low normal left ventricular systolic function.     Cardiac catheterization  Result Date: 1/16/2025  Narrative:   S/P PCI with MAGALI x 1 to the Prox LAD 95% stenosis and culprit for his NSTEMI   Prox LAD lesion is 95% stenosed.   The angioplasty was pre-stent angioplasty.     Incidental Findings:   None    Test Results Pending at Discharge (will require follow up):   None     Outpatient Tests Requested:  None    Complications:  None    Reason for Admission: Chest pain    Hospital Course:   Segun Grissom is a 59 y.o. male patient who originally presented to the hospital on 1/24/2025 due to chest pain.  He was found to have ST elevation MI and  underwent emergency catheterization showing 100% occlusion of the proximal LAD stent with thrombus.  This was treated with  PCI and drug-eluting stent placed.  He was monitored closely in the intensive care unit and followed by cardiology.    He was switched from Plavix to Brilinta due to suspected Plavix resistance.  Was admitted earlier this month for an NSTEMI and a drug-eluting stent was placed in the proximal LAD.  This was the stent that was stenosed.     Case management was involved in his care and since he has limited insurance the hospital provided for 1 month supply of Brilinta and the rest of his new medications.  He has a follow-up with cardiology this week on 1/29/2025.        Please see above list of diagnoses and related plan for additional information.     Condition at Discharge: good    Discharge Day Visit / Exam:   * Please refer to separate progress note for these details *    Discussion with Family: Updated  (wife) at bedside.    Discharge instructions/Information to patient and family:   See after visit summary for information provided to patient and family.      Provisions for Follow-Up Care:  See after visit summary for information related to follow-up care and any pertinent home health orders.       Disposition:   Home    Planned Readmission: no    Discharge Medications:  See after visit summary for reconciled discharge medications provided to patient and/or family.      Administrative Statements   Discharge Statement:  I have spent a total time of >30 minutes in caring for this patient on the day of the visit/encounter. .    **Please Note: This note may have been constructed using a voice recognition system**

## 2025-01-26 NOTE — NURSING NOTE
Discharge instructions reviewed with patient and spouse at bedside. All peripheral IV's removed. All questions answered. Pt left ICU walking (per pt request) no issues.

## 2025-01-26 NOTE — PROGRESS NOTES
Cardiology Progress Note   MD Santi Severino MD, FACC  Malcolm Garcia DO, [facility]  MD Katelin Chavez DO, [facility]  Germain Elliott DO, [facility]  ----------------------------------------------------------------  [id number] [place] [address]    Segun Grissom 59 y.o. male MRN: [id number]  Unit/Bed#: ICU 02 Encounter: [id number]      ASSESSMENT:   STEMI w/ in-stent thrombosis of pLAD stent with thrombectomy and MAGALI-pLAD, January 2025  CAD  NSTEMI s/p cath w/ MAGALI-pLAD  Hypertension  LVEF 68%, severe LVH, grade 1 diastolic dysfunction, AV sclerosis, mild MAC, trace TR, January 15, 2025  LVEF 50% with LAD territory RWMAs, mild MAC, January 25, 2025  Dyslipidemia w/  mg/dL, HDL of 43 mg/dL,  mg/dL, January 2025  Type 2 diabetes mellitus  Possible Plavix resistance    PLAN:  Patient is status post PCI and doing well  He has no further chest discomfort concerning for angina and no signs or symptoms of heart failure  Clinically examines to be euvolemic  Would avoid Plavix in the future  Lifelong aspirin and minimum 1 year dual antiplatelet therapy with Brilinta 90 mg twice daily  Continue high intensity statin, metoprolol and isosorbide  Echocardiogram showed mild reduction in left ventricular systolic function, still low normal  Repeat echocardiogram was recommended by me today to evaluate the LV apex with Definity and this was found to be negative for thrombus  ECGs as needed chest pain  Cardiac rehabilitation at discharge  Reasonable for downgrade to telemetry today with probable discharge tomorrow post echocardiogram    Signed: Malcolm Garcia DO, [facility], JULIA MACHADO, FACP      History of Present Illness:  Patient seen and examined.  Denies any chest pain, pressure, tightness or squeezing.  Denies lightheadedness, or palpitations.  Denies lower extremity swelling, orthopnea or paroxysmal nocturnal dyspnea.  Patient feels improved today.   Feeling improved overall.      Review of Systems:  Review of Systems   Constitutional: Negative for decreased appetite, fever, weight gain and weight loss.   HENT:  Negative for congestion and sore throat.    Eyes:  Negative for visual disturbance.   Cardiovascular:  Negative for chest pain, dyspnea on exertion, leg swelling, near-syncope and palpitations.   Respiratory:  Negative for cough and shortness of breath.    Hematologic/Lymphatic: Negative for bleeding problem.   Skin:  Negative for rash.   Musculoskeletal:  Negative for myalgias and neck pain.   Gastrointestinal:  Negative for abdominal pain and nausea.   Neurological:  Negative for light-headedness and weakness.   Psychiatric/Behavioral:  Negative for depression.        No Known Allergies    No current facility-administered medications on file prior to encounter.     Current Outpatient Medications on File Prior to Encounter   Medication Sig    albuterol (PROVENTIL HFA,VENTOLIN HFA) 90 mcg/act inhaler Inhale 1 puff every 6 (six) hours as needed for wheezing    amLODIPine (NORVASC) 10 mg tablet Take 1 tablet (10 mg total) by mouth daily after dinner    aspirin 81 mg chewable tablet Chew 1 tablet (81 mg total) daily    atorvastatin (LIPITOR) 80 mg tablet Take 1 tablet (80 mg total) by mouth daily with dinner    Blood Pressure Monitoring ([place] [employer] BP Monitor/Arm) KELLY Use daily as needed (dizziness)    clopidogrel (PLAVIX) 75 mg tablet Take 1 tablet (75 mg total) by mouth daily Do not start before January 18, 2025.    fluticasone-salmeterol (Advair HFA) 230-21 MCG/ACT inhaler Inhale 2 puffs 2 (two) times a day Rinse mouth after use.    isosorbide mononitrate (IMDUR) 60 mg 24 hr tablet Take 1 tablet (60 mg total) by mouth Daily at 2am Do not start before January 18, 2025.    losartan (COZAAR) 50 mg tablet Take 1 tablet (50 mg total) by mouth daily after lunch    metFORMIN (GLUCOPHAGE-XR) 500 mg 24 hr tablet Take 1 tablet (500 mg total) by mouth daily  with dinner    metoprolol tartrate (LOPRESSOR) 50 mg tablet Take 1 tablet (50 mg total) by mouth every 12 (twelve) hours    spironolactone (ALDACTONE) 50 mg tablet Take 1 tablet (50 mg total) by mouth daily Do not start before January 18, 2025.        Current Facility-Administered Medications   Medication Dose Route Frequency Provider Last Rate    aspirin  81 mg Oral Daily MAREN Turcios      atorvastatin  80 mg Oral Daily With Dinner MAREN Turcios      enoxaparin  40 mg Subcutaneous Daily Edarmen SantiagoekMAREN      insulin lispro  1-6 Units Subcutaneous TID AC Aamir SantiagoekMAREN      isosorbide mononitrate  30 mg Oral Daily Aamir SantiagoekMAREN      lidocaine  1 patch Topical Daily MAREN Turcios      metoprolol tartrate  25 mg Oral Q12H [facility] MAREN Turcios      sodium chloride (PF)  3 mL Intravenous Q1H PRN MAREN Turcios      ticagrelor  90 mg [facility]              Vitals:    01/25/25 2325 01/26/25 0244 01/26/25 0532 01/26/25 0700   BP:       Pulse:       Resp:       Temp: 99.2 °F (37.3 °C) 98.4 °F (36.9 °C)  98.4 °F (36.9 °C)   TempSrc: Oral Oral  Oral   SpO2:       Weight:   89.3 kg (196 lb 13.9 oz)    Height:         Body mass index is 31.78 kg/m².      Intake/Output Summary (Last 24 hours) at 1/26/2025 0731  Last data filed at 1/25/2025 1746  Gross per 24 hour   Intake 480 ml   Output --   Net 480 ml       Weight change: -0.241 kg (-8.5 oz)    PHYSICAL EXAMINATION:  Gen: Awake, Alert, NAD  Head/eyes: AT/NC, pupils equal and round, Anicteric  ENT: mmm  Neck: Supple, No elevated JVP, trachea midline  Resp: CTA bilaterally no w/r/r  CV: RRR +S1, S2, No m/r/g  Abd: Soft, NT/ND + BS  Ext: no LE edema bilaterally cath site c/d/i  Neuro: Follows commands, moves all extermities  Psych: Appropriate affect, happy mood, pleasant attitude, non-combative  Skin: warm; no rash, erythema or venous stasis changes on exposed skin    Lab  "Results:  Results from last 7 days   Lab Units 01/26/25  0530   WBC Thousand/uL 10.15   HEMOGLOBIN g/dL 13.2   HEMATOCRIT % 39.9   PLATELETS Thousands/uL 261     Results from last 7 days   Lab Units 01/26/25  0530 01/25/25  0542   POTASSIUM mmol/L 3.4* 3.9   CHLORIDE mmol/L 106 106   CO2 mmol/L 29 29   BUN mg/dL 24 25   CREATININE mg/dL 0.91 0.93   CALCIUM mg/dL 8.5 8.5   ALK PHOS U/L  --  69   ALT U/L  --  102*   AST U/L  --  336*     No results found for: \"TROPONINT\"      Results from last 7 days   Lab Units 01/24/25  1114 01/24/25  0908 01/24/25  0646   HS TNI 0HR ng/L  --   --  9   HS TNI 2HR ng/L  --  >22,973*  --    HS TNI 4HR ng/L >22,973*  --   --      Results from last 7 days   Lab Units 01/24/25  0917   INR  1.28*       Tele: SR     This note was completed in part utilizing [employer] Direct Software.  Grammatical errors, random word insertions, spelling mistakes, and incomplete sentences may be an occasional consequence of this system secondary to software limitations, ambient noise, and hardware issues.  If you have any questions or concerns about the content, text, or information contained within the body of this dictation, please contact the provider for clarification.      "

## 2025-01-27 ENCOUNTER — PATIENT OUTREACH (OUTPATIENT)
Dept: FAMILY MEDICINE CLINIC | Facility: CLINIC | Age: 60
End: 2025-01-27

## 2025-01-27 ENCOUNTER — TRANSITIONAL CARE MANAGEMENT (OUTPATIENT)
Dept: FAMILY MEDICINE CLINIC | Facility: CLINIC | Age: 60
End: 2025-01-27

## 2025-01-27 DIAGNOSIS — Z78.9 NEED FOR FOLLOW-UP BY SOCIAL WORKER: ICD-10-CM

## 2025-01-27 DIAGNOSIS — Z59.86 FINANCIAL INSECURITY: ICD-10-CM

## 2025-01-27 DIAGNOSIS — Z59.41 FOOD INSECURITY: ICD-10-CM

## 2025-01-27 DIAGNOSIS — Z59.71 DOES NOT HAVE HEALTH INSURANCE: Primary | ICD-10-CM

## 2025-01-27 DIAGNOSIS — Z59.82 TRANSPORTATION INSECURITY: ICD-10-CM

## 2025-01-27 DIAGNOSIS — Z59.86 PATIENT CANNOT AFFORD MEDICATIONS: Primary | ICD-10-CM

## 2025-01-27 SDOH — ECONOMIC STABILITY - INCOME SECURITY: FINANCIAL INSECURITY: Z59.86

## 2025-01-27 SDOH — ECONOMIC STABILITY - FOOD INSECURITY: FOOD INSECURITY: Z59.41

## 2025-01-27 SDOH — ECONOMIC STABILITY - TRANSPORTATION SECURITY: TRANSPORTATION INSECURITY: Z59.82

## 2025-01-27 NOTE — UTILIZATION REVIEW
Initial Clinical Review    Admission: Date/Time/Statement:   Admission Orders (From admission, onward)       Ordered        01/24/25 1915  INPATIENT ADMISSION  Once                          Orders Placed This Encounter   Procedures    INPATIENT ADMISSION     Standing Status:   Standing     Number of Occurrences:   1     Level of Care:   Level 1 Stepdown [13]     Estimated length of stay:   More than 2 Midnights     Certification:   I certify that inpatient services are medically necessary for this patient for a duration of greater than two midnights. See H&P and MD Progress Notes for additional information about the patient's course of treatment.     ED Arrival Information       Expected   -    Arrival   1/24/2025 06:26    Acuity   Emergent              Means of arrival   Ambulance    Escorted by   [facility] ([facility])    Service   Hospitalist    Admission type   Emergency              Arrival complaint   chest pain             Chief Complaint   Patient presents with    Chest Pain     Patient began experiencing chest pain about 2200 last evening. States it is a pressure, with pain and weakness in his left arm. Patient was recently in the hospital two weeks ago for an MI and stent placement. Patient received 2 nitro and 324 of ASA from EMS.        Initial Presentation: 59 y.o. male to ED by EMS as Inpatient ICU admission STEMI S/P Cardiac cath w thrombosis of recent stent   PMH history of recent NSTEMI 1/14/2025 CAD status post PCI to proximal LAD x 1.  DCd home on 1/17 on Plavix, aspirin, Lipitor. patient reports compliance with Plavix, HX hypertension.       Presented with chest pain on that began at 2200 the evening prior.  ECG  suggestive of STEMI in 2, 3, aVF, V2, V6, depressions in V5, V6.   Was taken for emergent cath was found to have 100% occlusion of the proximal LAD stent with thrombus.  Successful PCI with thrombectomy and placement of MAGALI.  ST elevations improved post cath.  Procedure  done  from a right femoral approach. Cont w mild CP post cath   Continue heparin infusion, aspirin and Brilinta,   Statin. Continue Imdur and beta-blocker  Give morphine as needed for chest pain  sliding scale insulin  Check P2 Y12 resistance test  Discussed with cardiology    Date: 1/25/2025   Day 2: ICU   no acute events overnight  status post thrombectomy and new stent, resolved chest pain  Coronary stent thrombosis managed as above, patient reports compliance with Plavix, to consider Plavix resistance  Continue aspirin, Brilinta and statin  Continue beta-blocker and Imdur  Continue heparin infusion for 48 hours as per cardiology  Follow on P2 Y12 resistance test  Continue sliding scale insulin, probably can start metformin on discharge  Encourage out of bed and ambulation  Transfer out of ICU to telemetry  Cardiology   Patient had STEMI with in-stent thrombosis  Possibly there is some degree of P2Y12 inhibitor resistance with Plavix  For now, would avoid Plavix for the time being  Lifelong aspirin and minimum 1 year dual antiplatelet therapy with Brilinta 90 mg twice daily  Continue high intensity statin, metoprolol and isosorbide  2D echocardiogram pending to reassess cardiac structure and function post MI  ECGs as needed chest pain  Cardiac rehabilitation at discharge  Reasonable for downgrade to telemetry today with probable discharge tomorrow post echocardiogram  ED Treatment-Medication Administration from 01/24/2025 0626 to 01/24/2025 0709         Date/Time Order Dose Route Action     01/24/2025 0636 nitroglycerin (NITROSTAT) SL tablet 0.4 mg Sublingual Given     01/24/2025 0645 nitroglycerin (NITROSTAT) SL tablet 0.4 mg Sublingual Given     01/24/2025 0647 morphine injection 4 mg 4 mg Intravenous Given     01/24/2025 0652 ticagrelor (BRILINTA) tablet 180 mg 180 mg Oral Given     01/24/2025 0659 heparin (porcine) injection 4,000 Units 4,000 Units Intravenous Given     01/24/2025 0656 sodium chloride 0.9 % bolus  500 mL 500 mL Intravenous New Bag            Scheduled Medications:  Medications 01/24 01/25 01/26   aspirin (ECOTRIN LOW STRENGTH) EC tablet 81 mg  Dose: 81 mg  Freq: Daily Route: PO  Start: 01/25/25 0900 End: 01/26/25 1608   Admin Instructions:        0807      0912     1608-D/C'd      aspirin (ECOTRIN LOW STRENGTH) EC tablet 81 mg  Dose: 81 mg  Freq: Daily Route: PO  Start: 01/24/25 1045 End: 01/24/25 1505   Admin Instructions:       1128     1505-D/C'd         1943      1605      1608-D/C'd      atorvastatin (LIPITOR) tablet 80 mg  Dose: 80 mg  Freq: Daily with dinner Route: PO  Indications of Use: ATHEROSCLEROTIC DISEASE  Start: 01/15/25 1630 End: 01/17/25 1715         chlorhexidine (PERIDEX) 0.12 % oral rinse 15 mL  Dose: 15 mL  Freq: Every 12 hours scheduled Route: MT  Start: 01/24/25 2100 End: 01/25/25 1507   Admin Instructions:       2120      0807     1507-D/C'd       enoxaparin (LOVENOX) subcutaneous injection 40 mg  Dose: 40 mg  Freq: Daily Route: SC  Start: 01/25/25 0900 End: 01/26/25 1608   Admin Instructions:        0807      0912     1608-D/C'd      heparin (porcine) injection 4,000 Units  Dose: 4,000 Units  Freq: Once Route: IV  Start: 01/24/25 0915 End: 01/24/25 0925   Admin Instructions:       0925          heparin (porcine) injection 4,000 Units  Dose: 4,000 Units  Freq: Once Route: IV  Start: 01/24/25 0700 End: 01/24/25 0659   Admin Instructions:       0659          isosorbide mononitrate (IMDUR) 24 hr tablet 30 mg  Dose: 30 mg  Freq: Daily Route: PO  Start: 01/24/25 1545 End: 01/26/25 1608       1545 [C]      0807      0912     1608-D/C'd      lidocaine (LIDODERM) 5 % patch 1 patch  Dose: 1 patch  Freq: Daily Route: TP  Start: 01/24/25 1145 End: 01/26/25 1608       1331      0315     (0808)      (4145) 5911-D/C'd      metoprolol tartrate (LOPRESSOR) tablet 50 mg  Dose: 50 mg  Freq: Every 12 hours scheduled Route: PO  Start: 01/16/25 2100 End: 01/17/25 1714            morphine injection 4  mg  Dose: 4 mg  Freq: Once Route: IV  Start: 01/24/25 0700 End: 01/24/25 0647   Admin Instructions:       0647          sodium chloride 0.9 % bolus 500 mL  Dose: 500 mL  Freq: Once Route: IV  Last Dose: Stopped (01/24/25 1342)  Start: 01/24/25 0700 End: 01/24/25 1342    0656     1342          ticagrelor (BRILINTA) tablet 180 mg  Dose: 180 mg  Freq: Once Route: PO  Start: 01/24/25 0700 End: 01/24/25 0652    0652          ticagrelor (BRILINTA) tablet 90 mg  Dose: 90 mg  Freq: Every 12 hours scheduled Route: PO  Indications of Use: ACUTE CORONARY SYNDROME  Start: 01/25/25 0900 End: 01/26/25 1608     0807     2059      0912     1608-D/C'd        Continuous IV Infusions:  heparin (porcine) 25,000 units in 0.45% NaCl 250 mL infusion (premix)  Rate: 2.6-17 mL/hr Dose: 3-20 Units/kg/hr  Weight Dosing Info: 85 kg (Order-Specific)  Freq: Titrated Route: IV  Last Dose: Stopped (01/24/25 1354)  Start: 01/24/25 0915 End: 01/24/25 1326  PRN Meds:  fentaNYL injection  Freq: Code/trauma/sedation medication Route: IV  Start: 01/24/25 0718 End: 01/24/25 0812     heparin (porcine) injection  Freq: Code/trauma/sedation medication Route: IV  Start: 01/24/25 0811 End: 01/24/25 0818    metoprolol (LOPRESSOR) injection  Freq: Code/trauma/sedation medication Route: IV  Start: 01/24/25 0737 End: 01/24/25 0812    midazolam (VERSED) injection  Freq: Code/trauma/sedation medication Route: IV  Start: 01/24/25 0718 End: 01/24/25 0812    nitroglycerin (NITROSTAT) SL tablet  Freq: Code/trauma/sedation medication  Start: 01/24/25 0636 End: 01/24/25 0654    perflutren lipid microsphere (DEFINITY) injection  Dose: 0.4 mL/min  Freq: Once in imaging Route: IV  PRN Reason: contrast  Start: 01/26/25 1051 End: 01/26/25 1040  ED Triage Vitals   Temperature Pulse Respirations Blood Pressure SpO2 Pain Score   01/24/25 0630 01/24/25 0630 01/24/25 0630 01/24/25 0630 01/24/25 0630 01/24/25 0647   98.2 °F (36.8 °C) (!) 124 20 135/87 95 % 9     Weight (last 2  days) before discharge       Date/Time Weight    01/26/25 1050 88.9 (196)    01/26/25 0532 89.3 (196.87)    01/25/25 1500 89.4 (197)    01/25/25 0600 89.6 (197.53)    01/24/25 0838 89.6 (197.53)    01/24/25 0630 92 (202.82)            Vital Signs (last 3 days) before discharge       Date/Time Temp Pulse Resp BP MAP (mmHg) SpO2 Calculated FIO2 (%) - Nasal Cannula Nasal Cannula O2 Flow Rate (L/min) O2 Device Patient Position - Orthostatic VS Jina Coma Scale Score Pain    01/26/25 1100 99 °F (37.2 °C) 96 30 -- -- -- -- -- -- -- -- --    01/26/25 1050 -- 102 -- 141/88 -- -- -- -- -- -- -- --    01/26/25 0912 -- 102 34 141/88 -- -- -- -- -- -- -- --    01/26/25 0800 -- -- -- -- -- -- -- -- -- -- -- No Pain    01/26/25 0700 98.4 °F (36.9 °C) 84 24 -- -- -- -- -- -- -- -- --    01/26/25 0400 -- -- -- -- -- -- -- -- -- -- 15 --    01/26/25 0244 98.4 °F (36.9 °C) -- -- -- -- -- -- -- -- -- -- --    01/26/25 0001 -- -- -- -- -- -- -- -- -- -- 15 No Pain    01/25/25 2325 99.2 °F (37.3 °C) -- -- -- -- -- -- -- -- -- -- --    01/25/25 2100 -- 105 26 135/79 100 -- -- -- -- -- -- --    01/25/25 2058 -- 105 26 135/79 100 -- -- -- -- -- -- --    01/25/25 2000 -- 110 30 -- -- -- -- -- -- -- 15 No Pain    01/25/25 1912 99.9 °F (37.7 °C) 113 29 -- -- -- -- -- -- -- -- --    01/25/25 1600 -- 110 20 145/76 104 -- -- -- -- -- -- --    01/25/25 1500 -- 97 22 134/78 100 97 % -- -- None (Room air) -- -- --    01/25/25 1400 -- 102 22 122/65 86 96 % -- -- -- -- -- --    01/25/25 1300 -- 99 23 123/65 88 95 % -- -- -- -- -- --    01/25/25 1206 -- -- -- -- -- -- -- -- -- -- -- No Pain    01/25/25 1200 -- 103 26 134/79 102 96 % -- -- -- -- -- --    01/25/25 1100 99 °F (37.2 °C) 100 31 131/68 94 96 % -- -- None (Room air) -- -- --    01/25/25 1000 -- 95 26 123/73 93 98 % -- -- -- -- -- --    01/25/25 0900 -- 100 22 131/75 97 98 % -- -- -- -- -- --    01/25/25 0800 -- 115 30 149/93 116 98 % 28 2 L/min Nasal cannula -- -- No Pain    01/25/25 0700  98.8 °F (37.1 °C) 100 22 -- -- 99 % 28 2 L/min Nasal cannula -- -- --    01/25/25 0330 99.2 °F (37.3 °C) -- -- -- -- -- -- -- -- -- -- --    01/25/25 0318 -- -- -- 132/75 97 -- -- -- -- -- -- --    01/25/25 0317 -- 103 21 -- -- 98 % -- -- -- -- -- --    01/25/25 0315 -- -- -- -- -- -- -- -- -- -- -- 7    01/24/25 2300 98.3 °F (36.8 °C) 97 24 127/85 100 98 % -- -- -- -- -- --    01/24/25 2200 -- 103 25 136/76 99 99 % -- -- -- -- -- --    01/24/25 2100 -- 110 23 131/75 97 98 % -- -- -- -- -- --    01/24/25 2000 -- 112 23 134/71 95 98 % -- -- -- -- -- --    01/24/25 1940 -- -- -- -- -- -- -- -- -- -- -- 8 01/24/25 1920 99 °F (37.2 °C) -- -- -- -- -- -- -- -- -- -- --    01/24/25 1908 -- 117 26 143/81 106 97 % -- -- -- -- -- --    01/24/25 1645 -- 119 24 138/77 102 98 % -- -- -- -- -- --    01/24/25 1639 -- -- -- -- -- -- -- -- -- -- -- 9    01/24/25 1500 98.9 °F (37.2 °C) 109 21 145/66 95 99 % -- -- -- -- -- --    01/24/25 1400 -- 106 21 138/68 96 99 % 28 2 L/min Nasal cannula -- -- --    01/24/25 1300 -- 108 18 126/66 90 99 % -- -- -- -- -- --    01/24/25 1200 -- 105 25 125/81 98 98 % 28 2 L/min Nasal cannula -- -- --    01/24/25 1100 98.8 °F (37.1 °C) 107 25 132/82 102 99 % 28 2 L/min Nasal cannula -- -- --    01/24/25 0934 -- -- -- -- -- -- -- -- -- -- -- 10 - Worst Possible Pain    01/24/25 0838 97.7 °F (36.5 °C) 100 23 136/85 -- 97 % 28 2 L/min Nasal cannula Lying -- --    01/24/25 08:08:40 -- -- -- -- -- -- -- -- -- -- -- 5    01/24/25 07:16:27 -- -- -- -- -- 100 % 28 2 L/min Nasal cannula -- -- --    01/24/25 0700 -- 117 16 143/85 105 97 % 28 2 L/min Nasal cannula Lying -- --    01/24/25 0650 -- 128 23 128/73 -- 98 % -- -- -- -- -- --    01/24/25 0647 -- -- -- -- -- -- -- -- -- -- -- 9    01/24/25 0640 -- 121 20 141/84 -- 95 % -- -- -- -- -- --    01/24/25 0635 -- 120 23 150/86 -- 95 % -- -- -- -- -- --    01/24/25 0630 98.2 °F (36.8 °C) 124 20 135/87 -- 95 % -- -- None (Room air) Sitting -- --               Pertinent Labs/Diagnostic Test Results:   Radiology:  X-ray chest 1 view portable   Final Interpretation by Av Chang MD (01/24 0823)      No acute cardiopulmonary disease.            Workstation performed: [id number]           Cardiology:  Echo follow up/limited w/ contrast if indicated   Final Result by Malcolm Garcia DO (01/26 4126)        Left Ventricle: Left ventricular cavity size is normal. The left    ventricular ejection fraction is 50% by visual estimation. Systolic    function is low normal. There is moderate hypokinesis of the anteroseptal    wall from mid to apex, anterior wall from the mid to apex and apical wall.     Right Ventricle: Right ventricular cavity size is normal. Systolic    function is normal.     Left Atrium: The atrium is dilated.     Mitral Valve: There is mild annular calcification.     Pericardium: There is a trivial pericardial effusion. There is no    echocardiographic evidence of tamponade.     Compared to report from January 25, 2025, contra study was performed    showing no evidence of apical thrombus.         Echo follow up/limited w/ contrast if indicated   Final Result by Malcolm Garcia DO (01/26 5412)        Left Ventricle: Left ventricular cavity size is normal. Wall thickness    is increased. The left ventricular ejection fraction is 50% by visual    estimation. Systolic function is low normal. There is mild hypokinesis of    the anteroseptal wall from base to apex, moderate hypokinesis of the    anterior wall from base to apex and severe apical hypokinesis.  There are    no obvious echocardiographic indications of apical thrombus, but study is    mildly limited.  Can consider echocardiogram with contrast to evaluate    apex as clinically indicated.     Right Ventricle: Right ventricular cavity size is normal. Systolic    function is normal.     Mitral Valve: There is mild annular calcification.     Compared to report from January 15, 2025, there  are now regional wall    motion abnormalities with low normal left ventricular systolic function.         ECG 12 lead   Final Result by Santi Ling MD (01/24 1107)   Sinus tachycardia   Septal infarct (cited on or before 15-Kieran-2025)   Abnormal ECG   When compared with ECG of 24-Jan-2025 08:42,   Right bundle branch block is no longer Present   Confirmed by Santi Ling ([id number]) on 1/24/2025 11:07:33 AM      ECG 12 lead   Final Result by Santi Ling MD (01/24 0857)   Normal sinus rhythm   Right bundle branch block   Anteroseptal infarct (cited on or before 15-Kieran-2025)   Lateral injury pattern   ** ** ACUTE MI / STEMI ** **   Abnormal ECG   When compared with ECG of 24-Jan-2025 06:32,   Right bundle branch block is now Present   Criteria for Inferior infarct are no longer Present   Questionable change in initial forces of Anterior leads   Confirmed by Santi Ling ([id number]) on 1/24/2025 8:56:58 AM      ECG 12 lead   Final Result by Santi Ling MD (01/24 0805)   Sinus tachycardia   Inferior infarct , new   Anteroseptal infarct (cited on or before 15-Kieran-2025)   ** ** ACUTE MI / STEMI ** **   Consider right ventricular involvement in acute inferior infarct   Abnormal ECG   When compared with ECG of 15-Kieran-2025 20:23,   Fusion complexes are no longer Present   Acute Inferior infarct is now Present   Confirmed by Santi Ling [id number]) on 1/24/2025 8:05:58 AM        GI:  No orders to display           Results from last 7 days   Lab Units 01/26/25  0530 01/25/25  0542 01/24/25  0914 01/24/25  0646   WBC Thousand/uL 10.15 14.26* 23.96* 22.70*   HEMOGLOBIN g/dL 13.2 13.1 13.9 14.9   HEMATOCRIT % 39.9 39.3 42.3 44.1   PLATELETS Thousands/uL 261 263 297 335   TOTAL NEUT ABS Thousands/µL 6.56 11.78*  --  19.23*         Results from last 7 days   Lab Units 01/26/25  0530 01/25/25  0542 01/24/25  0646   SODIUM mmol/L 139 137 138   POTASSIUM mmol/L 3.4* 3.9 4.0   CHLORIDE mmol/L 106 106  "102   CO2 mmol/L 29 29 24   ANION GAP mmol/L 4 2* 12   BUN mg/dL 24 25 24   CREATININE mg/dL 0.91 0.93 1.15   EGFR ml/min/1.73sq m 90 88 68   CALCIUM mg/dL 8.5 8.5 9.6   MAGNESIUM mg/dL 1.9 1.9  --    PHOSPHORUS mg/dL  --  2.5  --      Results from last 7 days   Lab Units 01/25/25  0542   AST U/L 336*   ALT U/L 102*   ALK PHOS U/L 69   TOTAL PROTEIN g/dL 6.8   ALBUMIN g/dL 3.7   TOTAL BILIRUBIN mg/dL 0.45     Results from last 7 days   Lab Units 01/26/25  1102 01/26/25  0713 01/25/25  1652 01/25/25  1106 01/25/25  0715 01/24/25  2218   POC GLUCOSE mg/dl 149* 113 135 146* 117 145*     Results from last 7 days   Lab Units 01/26/25  0530 01/25/25  0542 01/24/25  0646   GLUCOSE RANDOM mg/dL 124 145* 214*             No results found for: \"BETA-HYDROXYBUTYRATE\"                   Results from last 7 days   Lab Units 01/24/25  1114 01/24/25  0908 01/24/25  0646   HS TNI 0HR ng/L  --   --  9   HS TNI 2HR ng/L  --  >22,973*  --    HSTNI D2 ng/L  --  >22,964*  --    HS TNI 4HR ng/L >22,973*  --   --    HSTNI D4 ng/L >22,964*  --   --          Results from last 7 days   Lab Units 01/24/25  0917   PROTIME seconds 16.1*   INR  1.28*   PTT seconds >210*                                                                                                               Past Medical History:   Diagnosis Date    Asthma     Hyperlipidemia     Hypertension     Sinus congestion      Present on Admission:   STEMI involving left anterior descending coronary artery (HCC)   Primary hypertension   Type 2 diabetes mellitus, without long-term current use of insulin (HCC)      Admitting Diagnosis: Chest pain [R07.9]  STEMI (ST elevation myocardial infarction) (HCC) [I21.3]  Age/Sex: 59 y.o. male    Network Utilization Review Department  ATTENTION: Please call with any questions or concerns to [phone] and carefully listen to the prompts so that you are directed to the right person. All voicemails are confidential.   For Discharge needs, contact " Care Management DC Support Team at [phone] opt. 2  Send all requests for admission clinical reviews, approved or denied determinations and any other requests to dedicated fax number below belonging to the campus where the patient is receiving treatment. List of dedicated fax numbers for the Facilities:  FACILITY NAME UR FAX NUMBER   ADMISSION DENIALS (Administrative/Medical Necessity) [phone]   DISCHARGE SUPPORT TEAM (NETWORK) [phone]   PARENT CHILD HEALTH (Maternity/NICU/Pediatrics) [phone]   [facility] [phone]   [facility] [phone]   [facility] [phone]   [facility] [phone]   [facility] [phone]   General acute hospital [phone]   [facility] [phone]   [facility] [phone]   [facility] [phone]   Critical access hospital [phone]   [facility] [phone]   [facility] [phone]

## 2025-01-27 NOTE — PROGRESS NOTES
OP TARIQ received an in basket from , Malathi Bledsoe. Per in basket, patient is without insurance and Medicaid is pending from hospital. Per in basket, supplied with medications upon discharge however will need assistance completing Medicaid as well as possible medication asst programs. Per in basket, Cardiologist noted that it is imperative that patient is on these meds as he had a massive infarct and without them it would be astronomical.    JUSTIN POTTER had called the patient via phone. Patient told JUSTIN POTTER that he understands both English and Indonesian but prefers Indonesian. JUSTIN POTTER had introduced herself. OP TARIQ reviewed reason for consult. OP TARIQ reviewed her role. Patient understood.    Patient stated he lives with his spouse, Luli. Patient stated Luli is main support. Patient stated he was receiving unemployment benefits but that ended 2 weeks ago. 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    Patient stated he called SS office and is awaiting paperwork. JUSTIN POTTER informed the patient that he can go into the office or call the toll-free number [phone] to get assistance with SS application. Patient understood. JUSTIN POTTER offered to make referral to [facility] as they also help with SS process. Patient agreed.    Patient stated he does not know what he will do regarding rent. Patient was referred on [employer] to [employer] [facility] [employer] (program) for housing. Patient stated he will reach out to see if they can help. Patient stated he does have LIHEAP so has utilities covered.    Patient stated he was referred to [facility] back on 1/14 but has not heard back from them. Patient stated he was able to  medications from [facility]. JUSTIN POTTER will discuss Bridging the Gap program for one-time medication assistance if patient does not have insurance by end of month. Patient stated he needs SNAP benefits. Patient stated he usually walks to his appointments. JUSTIN POTTER  discussed possibility of [employer] for transportation.      OP SW reviewed CMOC role with patient. Patient understood. Patient agreeable to CMOC outreach. OP SW placed referral to assist with Medicaid, SNAP and possibly Lanta Van.    Per bairon,t there are no h/o MH. Per chart, there are no h/o ABHI. OP SW made referral to [facility]. Patient denied any other needs at this time.    OP SW had provided her contact information. OP SW advised patient reach out as needed. Patient agreed. Patient consented to continued SW outreach at this time. OP SW added patient to reported under socially complex. OP SW routed note to CMOCs. OP SW will continue to f/u.

## 2025-01-29 ENCOUNTER — TELEPHONE (OUTPATIENT)
Dept: CARDIOLOGY CLINIC | Facility: CLINIC | Age: 60
End: 2025-01-29

## 2025-01-29 ENCOUNTER — OFFICE VISIT (OUTPATIENT)
Dept: CARDIOLOGY CLINIC | Facility: CLINIC | Age: 60
End: 2025-01-29

## 2025-01-29 VITALS
BODY MASS INDEX: 31.82 KG/M2 | HEART RATE: 104 BPM | DIASTOLIC BLOOD PRESSURE: 80 MMHG | HEIGHT: 66 IN | SYSTOLIC BLOOD PRESSURE: 118 MMHG | WEIGHT: 198 LBS

## 2025-01-29 DIAGNOSIS — I21.3 STEMI (ST ELEVATION MYOCARDIAL INFARCTION) (HCC): ICD-10-CM

## 2025-01-29 DIAGNOSIS — I21.4 NSTEMI (NON-ST ELEVATED MYOCARDIAL INFARCTION) (HCC): ICD-10-CM

## 2025-01-29 DIAGNOSIS — J45.20 MILD INTERMITTENT ASTHMA, UNSPECIFIED WHETHER COMPLICATED: ICD-10-CM

## 2025-01-29 DIAGNOSIS — I21.02 STEMI INVOLVING LEFT ANTERIOR DESCENDING CORONARY ARTERY (HCC): Primary | ICD-10-CM

## 2025-01-29 DIAGNOSIS — J30.1 NON-SEASONAL ALLERGIC RHINITIS DUE TO POLLEN: ICD-10-CM

## 2025-01-29 PROCEDURE — 99215 OFFICE O/P EST HI 40 MIN: CPT | Performed by: PHYSICIAN ASSISTANT

## 2025-01-29 PROCEDURE — 93000 ELECTROCARDIOGRAM COMPLETE: CPT | Performed by: PHYSICIAN ASSISTANT

## 2025-01-29 RX ORDER — ALBUTEROL SULFATE 90 UG/1
1 INHALANT RESPIRATORY (INHALATION) EVERY 6 HOURS PRN
Qty: 8.5 G | Refills: 5 | Status: SHIPPED | OUTPATIENT
Start: 2025-01-29 | End: 2025-01-30 | Stop reason: SDUPTHER

## 2025-01-29 RX ORDER — METOPROLOL TARTRATE 25 MG/1
50 TABLET, FILM COATED ORAL EVERY 12 HOURS SCHEDULED
Qty: 120 TABLET | Refills: 3 | Status: SHIPPED | OUTPATIENT
Start: 2025-01-29 | End: 2025-01-30 | Stop reason: SDUPTHER

## 2025-01-29 NOTE — LETTER
January 29, 2025     Patient: Segun Alberts  YOB: 1965  Date of Visit: 1/29/2025      To Whom it May Concern:    Segun Alberts is under my professional care. Segun was hospitalized from January 14 - January 17 and again from January 24-January 26, 2025.  And seen in my office on 1/29/2025. Segun's active cardiovascular diagnoses include primary hypertension, recent STEMI involving LAD with subsequent coronary artery stent thrombosis (second STEMI), asthma, AMOL, type 2 diabetes & dyslipidemia.  Most especially, due to CAD with stable angina, Segun is presently disabled and should not return to work until at least his next appointment with Cardiology, March 4th, 2024.    If you have any questions or concerns, please don't hesitate to call.         Sincerely,          Lilian Goode PA-C

## 2025-01-29 NOTE — PROGRESS NOTES
Transition of Care Visit  Name: Segun Alberts      : 1965      MRN: [id number]  Encounter Provider: Mauricio Kaye MD  Encounter Date: 2025   Encounter department: [facility]    Assessment & Plan  Transition of care  Hospitalization for MI, s/p catheterization  On  aspirin, Lipitor, Imdur, Metoprolol and Brilinta.  Reports compliance  Following with Cardiology who recommended and provided 1 month work leave  Medications reviewed and refills provided   Case management on board as patient has financial barriers   Patient advised to continue follow up Cardiology and return as scheduled for management of his chronic conditions   ED precautions given     Orders:    losartan (COZAAR) 50 mg tablet; Take 1 tablet (50 mg total) by mouth daily after lunch    spironolactone (ALDACTONE) 50 mg tablet; Take 1 tablet (50 mg total) by mouth daily    isosorbide mononitrate (IMDUR) 30 mg 24 hr tablet; Take 1 tablet (30 mg total) by mouth daily    IRIS Diabetic eye exam    Mild intermittent asthma, unspecified whether complicated  Well controlled. Not on exacerbation   Meds refilled    Orders:    fluticasone-salmeterol (Advair HFA) 230-21 MCG/ACT inhaler; Inhale 2 puffs 2 (two) times a day Rinse mouth after use.    albuterol (PROVENTIL HFA,VENTOLIN HFA) 90 mcg/act inhaler; Inhale 1 puff every 6 (six) hours as needed for wheezing    Type 2 diabetes mellitus without complication, without long-term current use of insulin ([facility])    Lab Results   Component Value Date    HGBA1C 6.7 (H) 2025   Well controlled  On Metformin 500 mg qd   Due for diabetic eye and foot exam    Plan:  Continue current regime   Follow as scheduled with PCP     Orders:    metFORMIN (GLUCOPHAGE-XR) 500 mg 24 hr tablet; Take 1 tablet (500 mg total) by mouth daily with dinner    IRIS Diabetic eye exam    CPAP (continuous positive airway pressure) dependence  AMOL affecting his sleep  Patient's CPAP broke  and needs another one  Replacement order placed  Sleep Medicine referral in case patient is unable to obtain machine as we do not have the settings  Orders:    PAP DME Resupply/Reorder    Ambulatory Referral to Sleep Medicine; Future    BMI Counseling: Body mass index is 32.6 kg/m². The BMI is above normal. Nutrition recommendations include decreasing portion sizes, consuming healthier snacks and increasing intake of lean protein. Exercise recommendations include exercising 3-5 times per week. No pharmacotherapy was ordered. Rationale for BMI follow-up plan is due to patient being overweight or obese.         History of Present Illness     Transitional Care Management Review:   Segun Alberts is a 59 y.o. male here for TCM follow up.     During the TCM phone call patient stated:  TCM Call       Date and time call was made  1/27/2025  3:38 PM    Patient was hospitialized at  [facility]    Date of Admission  01/24/25    Date of discharge  01/26/25    Diagnosis  STEMI involving left anterior descending coronary artery (HCC)    Disposition  Home    Current Symptoms  None          TCM Call       Post hospital issues  None    Should patient be enrolled in anticoag monitoring?  No    Scheduled for follow up?  Yes    Patients specialists  Cardiologist    Did you obtain your prescribed medications  Yes    Do you need help managing your prescriptions or medications  No    Is transportation to your appointment needed  No    I have advised the patient to call PCP with any new or worsening symptoms  Stephanie Coker/PC    Counseling  Patient          Segun Grissom is a 59 y.o. male patient who was recently hospitalized on 1/24/2025 due to myocardial infarction.  He underwent catheterization that showed a 100% occlusion of the proximal LAD stent with thrombus.  This was treated with  PCI and drug-eluting stent placed.  He was monitored closely in the intensive care unit and followed by cardiology.   He  previously had another myocardial infarction on 1/14/2025 for which he was treated with drug-eluting stent in the proximal LAD and was started on Plavix. Due to this new event Plavix resistance was suspected and patient was switched from Plavix to Brilinta.    Patient was discharged on aspirin, Lipitor, Imdur, Metoprolol and Brilinta. Patient reports compliance.     He was seen cardiology yesterday.  EKG done in office showed improvement.  Lopressor was increased to 50 mg twice daily.  He was advised by cardiology to stay out of work for at least a month as he should not be working if he still has angina.     Today patient reports to feel better, but complaints of not sleeping well. He states that he slept better in the hospital where he was using a CPAP machine. He reports that he had a machine at home, but it broke a couple years ago. He sent back to the company hoping to get a replacement, but he never got another machine. He states that this sleeping problem was present before hospitalization.     Regarding chest pain, he denies currently, but he can feel pain when exerting himself. He discuss that with Cardiology who recommended to be out of work while experiencing these symptoms. He had a letter filled by Cardiology. He has a follow-up visit in 1 month    Patient is aware of referrals and follow up visits.         Review of Systems   Constitutional:  Negative for fever.   HENT:  Negative for congestion, ear pain, rhinorrhea and sore throat.    Eyes:  Negative for visual disturbance.   Respiratory:  Negative for cough and shortness of breath.         Reports SOB while sleeping. Not currently.    Cardiovascular:  Negative for chest pain, palpitations and leg swelling.        No chest pain currently, but still has chest pain on exertion   Gastrointestinal:  Negative for abdominal pain, constipation, diarrhea, nausea and vomiting.   Genitourinary:  Negative for dysuria and hematuria.   Skin:  Negative for rash.  "  Neurological:  Negative for dizziness, light-headedness and headaches.   Psychiatric/Behavioral:  The patient is not nervous/anxious.    All other systems reviewed and are negative.    Objective   /70 (BP Location: Left arm, Patient Position: Sitting, Cuff Size: Large)   Pulse 100   Temp (!) 96.6 °F (35.9 °C) (Temporal)   Resp 16   Ht 5' 6\" (1.676 m)   Wt 91.6 kg (202 lb)   SpO2 99%   BMI 32.60 kg/m²     Physical Exam  Vitals and nursing note reviewed.   Constitutional:       General: He is not in acute distress.     Appearance: He is well-developed.   HENT:      Head: Normocephalic and atraumatic.      Right Ear: Tympanic membrane and external ear normal. There is no impacted cerumen.      Left Ear: Tympanic membrane and external ear normal. There is no impacted cerumen.      Nose: Nose normal. No congestion or rhinorrhea.      Mouth/Throat:      Mouth: Mucous membranes are moist.      Pharynx: Oropharynx is clear. No oropharyngeal exudate or posterior oropharyngeal erythema.   Eyes:      General: No scleral icterus.     Conjunctiva/sclera: Conjunctivae normal.   Cardiovascular:      Rate and Rhythm: Normal rate and regular rhythm.      Pulses: Normal pulses.      Heart sounds: Normal heart sounds. No murmur heard.  Pulmonary:      Effort: Pulmonary effort is normal. No respiratory distress.      Breath sounds: Normal breath sounds.   Abdominal:      General: Bowel sounds are normal. There is no distension.      Palpations: Abdomen is soft. There is no mass.      Tenderness: There is no abdominal tenderness. There is no guarding.   Musculoskeletal:      Cervical back: Neck supple.      Right lower leg: No edema.      Left lower leg: No edema.   Skin:     General: Skin is warm and dry.      Capillary Refill: Capillary refill takes less than 2 seconds.      Comments: Catheterization site totally healed. No signs of infection    Neurological:      General: No focal deficit present.      Mental Status: " He is alert and oriented to person, place, and time.   Psychiatric:         Mood and Affect: Mood normal.         Behavior: Behavior normal.       Medications have been reviewed by provider in current encounter

## 2025-01-29 NOTE — PATIENT INSTRUCTIONS
For your diet please avoid fast food like [employer] or [employer].  Try to eat low-fat, minimize red meat like pork and beef.  Maybe once per week.    Any kind of vegetables are good.  Do not salt your food.    Rice is okay.

## 2025-01-30 ENCOUNTER — PATIENT OUTREACH (OUTPATIENT)
Dept: FAMILY MEDICINE CLINIC | Facility: CLINIC | Age: 60
End: 2025-01-30

## 2025-01-30 ENCOUNTER — OFFICE VISIT (OUTPATIENT)
Dept: FAMILY MEDICINE CLINIC | Facility: CLINIC | Age: 60
End: 2025-01-30

## 2025-01-30 VITALS
HEIGHT: 66 IN | BODY MASS INDEX: 32.47 KG/M2 | RESPIRATION RATE: 16 BRPM | TEMPERATURE: 96.6 F | SYSTOLIC BLOOD PRESSURE: 120 MMHG | DIASTOLIC BLOOD PRESSURE: 70 MMHG | HEART RATE: 100 BPM | OXYGEN SATURATION: 99 % | WEIGHT: 202 LBS

## 2025-01-30 DIAGNOSIS — J45.20 MILD INTERMITTENT ASTHMA, UNSPECIFIED WHETHER COMPLICATED: ICD-10-CM

## 2025-01-30 DIAGNOSIS — I21.4 NSTEMI (NON-ST ELEVATED MYOCARDIAL INFARCTION) (HCC): ICD-10-CM

## 2025-01-30 DIAGNOSIS — Z99.89 CPAP (CONTINUOUS POSITIVE AIRWAY PRESSURE) DEPENDENCE: ICD-10-CM

## 2025-01-30 DIAGNOSIS — Z78.9 TRANSITION OF CARE: Primary | ICD-10-CM

## 2025-01-30 DIAGNOSIS — E11.9 TYPE 2 DIABETES MELLITUS WITHOUT COMPLICATION, WITHOUT LONG-TERM CURRENT USE OF INSULIN (HCC): ICD-10-CM

## 2025-01-30 DIAGNOSIS — Z78.9 TRANSITION OF CARE: ICD-10-CM

## 2025-01-30 DIAGNOSIS — I21.3 STEMI (ST ELEVATION MYOCARDIAL INFARCTION) (HCC): ICD-10-CM

## 2025-01-30 PROCEDURE — 99496 TRANSJ CARE MGMT HIGH F2F 7D: CPT

## 2025-01-30 RX ORDER — LOSARTAN POTASSIUM 50 MG/1
50 TABLET ORAL
Qty: 90 TABLET | Refills: 0 | Status: SHIPPED | OUTPATIENT
Start: 2025-01-30 | End: 2025-04-30

## 2025-01-30 RX ORDER — ALBUTEROL SULFATE 90 UG/1
1 INHALANT RESPIRATORY (INHALATION) EVERY 6 HOURS PRN
Qty: 8.5 G | Refills: 5 | Status: SHIPPED | OUTPATIENT
Start: 2025-01-30

## 2025-01-30 RX ORDER — ALBUTEROL SULFATE 90 UG/1
1 INHALANT RESPIRATORY (INHALATION) EVERY 6 HOURS PRN
Qty: 8.5 G | Refills: 5 | Status: SHIPPED | OUTPATIENT
Start: 2025-01-30 | End: 2025-01-30 | Stop reason: SDUPTHER

## 2025-01-30 RX ORDER — ISOSORBIDE MONONITRATE 30 MG/1
30 TABLET, EXTENDED RELEASE ORAL DAILY
Qty: 30 TABLET | Refills: 11 | Status: SHIPPED | OUTPATIENT
Start: 2025-01-30

## 2025-01-30 RX ORDER — METOPROLOL TARTRATE 25 MG/1
50 TABLET, FILM COATED ORAL EVERY 12 HOURS SCHEDULED
Qty: 120 TABLET | Refills: 11 | Status: SHIPPED | OUTPATIENT
Start: 2025-01-30

## 2025-01-30 RX ORDER — ASPIRIN 81 MG/1
81 TABLET, CHEWABLE ORAL DAILY
Qty: 30 TABLET | Refills: 11 | Status: SHIPPED | OUTPATIENT
Start: 2025-01-30

## 2025-01-30 RX ORDER — SPIRONOLACTONE 50 MG/1
50 TABLET, FILM COATED ORAL DAILY
Qty: 90 TABLET | Refills: 0 | Status: SHIPPED | OUTPATIENT
Start: 2025-01-30 | End: 2025-04-30

## 2025-01-30 RX ORDER — ATORVASTATIN CALCIUM 80 MG/1
80 TABLET, FILM COATED ORAL
Qty: 30 TABLET | Refills: 11 | Status: SHIPPED | OUTPATIENT
Start: 2025-01-30

## 2025-01-30 RX ORDER — METFORMIN HYDROCHLORIDE 500 MG/1
500 TABLET, EXTENDED RELEASE ORAL
Qty: 90 TABLET | Refills: 0 | Status: SHIPPED | OUTPATIENT
Start: 2025-01-30 | End: 2025-04-30

## 2025-01-30 RX ORDER — LOSARTAN POTASSIUM 50 MG/1
50 TABLET ORAL
Qty: 90 TABLET | Refills: 0 | Status: SHIPPED | OUTPATIENT
Start: 2025-01-30 | End: 2025-01-30 | Stop reason: SDUPTHER

## 2025-01-30 RX ORDER — FLUTICASONE PROPIONATE AND SALMETEROL XINAFOATE 230; 21 UG/1; UG/1
2 AEROSOL, METERED RESPIRATORY (INHALATION) 2 TIMES DAILY
Qty: 24 G | Refills: 3 | Status: SHIPPED | OUTPATIENT
Start: 2025-01-30

## 2025-01-30 RX ORDER — ISOSORBIDE MONONITRATE 30 MG/1
30 TABLET, EXTENDED RELEASE ORAL DAILY
Qty: 30 TABLET | Refills: 0 | Status: SHIPPED | OUTPATIENT
Start: 2025-01-30 | End: 2025-01-30 | Stop reason: SDUPTHER

## 2025-01-30 RX ORDER — ISOSORBIDE MONONITRATE 30 MG/1
30 TABLET, EXTENDED RELEASE ORAL DAILY
Qty: 30 TABLET | Refills: 11 | Status: SHIPPED | OUTPATIENT
Start: 2025-01-30 | End: 2025-01-30 | Stop reason: SDUPTHER

## 2025-01-30 RX ORDER — METFORMIN HYDROCHLORIDE 500 MG/1
500 TABLET, EXTENDED RELEASE ORAL
Qty: 90 TABLET | Refills: 0 | Status: SHIPPED | OUTPATIENT
Start: 2025-01-30 | End: 2025-01-30 | Stop reason: SDUPTHER

## 2025-01-30 RX ORDER — FLUTICASONE PROPIONATE AND SALMETEROL XINAFOATE 230; 21 UG/1; UG/1
2 AEROSOL, METERED RESPIRATORY (INHALATION) 2 TIMES DAILY
Qty: 24 G | Refills: 3 | Status: SHIPPED | OUTPATIENT
Start: 2025-01-30 | End: 2025-01-30 | Stop reason: SDUPTHER

## 2025-01-30 RX ORDER — SPIRONOLACTONE 50 MG/1
50 TABLET, FILM COATED ORAL DAILY
Qty: 90 TABLET | Refills: 0 | Status: SHIPPED | OUTPATIENT
Start: 2025-01-30 | End: 2025-01-30 | Stop reason: SDUPTHER

## 2025-01-30 NOTE — PROGRESS NOTES
JUSTIN POTTER had called the patient after his office visit. JUSTIN POTTER advised patient let her know cost of medications at [facility]. JUSTIN POTTER informed the patient that if he cannot afford it can go through bridging the Gap program for one-time medication assistance. Patient agreed.     Patient later called JUSTIN POTTER and told her that cost is too high. JUSTIN POTTER informed the patient that she would send message to provider to send medications to [facility]. JUSTIN POTTER informed the patient that she will send email regarding Bridging the Gap program to cover medications. JUSTIN POTTER informed the patient that once it is approved then she would give him a call back. Patient understood.    JUSTIN POTTER send message to Lilian Goode PA-C to have medications sent to [facility]. JUSTIN POTTER notes Lilian sent message yesterday regarding medications being added to Bridging the Gap. JUSTIN POTTER also sent message to Dr. Kaye regarding same. JUSTIN POTTER received response from Lilian that medication was sent. JUSTIN POTTER received response from Dr. Kaye who also stated medication was sent.    JUSTIN POTTER sent email to Sunday Greenwood, Pharmacy Manager; Adriana Sterling,  [facility]; Thai No, Community Health Nurse; aSmantha Ross, [facility] Navigator with following medications needed:    medications he needs:  Albuterol 90mcg  Fluticasone-salmeterol 230-21 mcg  isosorbide mononitrate 30mg  Losartan 50mg  MetFORMIN 500mg  Spironolactone 50mg  aspirin 81 mg  metoprolol tartrate 25 mg  atorvastatin 80 mg  ticagrelor 90 MG     Adriana responded asking Sunday for cost. Sunday responded that patient was provided 30-day supplies of the following medications while in the hospital on 01/26/25:    aspirin 81 mg  isosorbide mononitrate 30mg  metoprolol tartrate 25 mg  ticagrelor (Brilinta) 90 MG     Sunday asked if patient would need everything on list. JUSTIN POTTER responded patient's metoprolol tartrate 25 mg was increased yesterday. JUSTIN POTTER  also noted need for all of the medications. Sunday responded that he can fill everything except the aspirin, isosorbide and ticagrelor (Brilinta). Sunday stated those 3 prescriptions the dosages that he received on the 26th did not change. Sunday priced below:    Metoprolol tartrate: $10.45  Atorvastatin: $11.48  Spironolactone: $16.56  Metformin ER: $11.97  Losartan: $14.12  Advair HFA: $441.08  Albuterol HFA: $25.99  Grand total: $531.65    Adriana responded with possible change of Advair due to cost. Adriana had included VITO Browning in email. OP SW will await response. OP SW will continue to f/u.

## 2025-01-30 NOTE — ASSESSMENT & PLAN NOTE
Well controlled. Not on exacerbation   Meds refilled    Orders:    fluticasone-salmeterol (Advair HFA) 230-21 MCG/ACT inhaler; Inhale 2 puffs 2 (two) times a day Rinse mouth after use.    albuterol (PROVENTIL HFA,VENTOLIN HFA) 90 mcg/act inhaler; Inhale 1 puff every 6 (six) hours as needed for wheezing

## 2025-01-30 NOTE — ASSESSMENT & PLAN NOTE
Lab Results   Component Value Date    HGBA1C 6.7 (H) 01/14/2025   Well controlled  On Metformin 500 mg qd   Due for diabetic eye and foot exam    Plan:  Continue current regime   Follow as scheduled with PCP     Orders:    metFORMIN (GLUCOPHAGE-XR) 500 mg 24 hr tablet; Take 1 tablet (500 mg total) by mouth daily with dinner    IRIS Diabetic eye exam

## 2025-01-30 NOTE — ASSESSMENT & PLAN NOTE
Hospitalization for MI, s/p catheterization  On  aspirin, Lipitor, Imdur, Metoprolol and Brilinta.  Reports compliance  Following with Cardiology who recommended and provided 1 month work leave  Medications reviewed and refills provided   Case management on board as patient has financial barriers   Patient advised to continue follow up Cardiology and return as scheduled for management of his chronic conditions   ED precautions given     Orders:    losartan (COZAAR) 50 mg tablet; Take 1 tablet (50 mg total) by mouth daily after lunch    spironolactone (ALDACTONE) 50 mg tablet; Take 1 tablet (50 mg total) by mouth daily    isosorbide mononitrate (IMDUR) 30 mg 24 hr tablet; Take 1 tablet (30 mg total) by mouth daily    IRIS Diabetic eye exam

## 2025-01-30 NOTE — ASSESSMENT & PLAN NOTE
AMOL affecting his sleep  Patient's CPAP broke and needs another one  Replacement order placed  Sleep Medicine referral in case patient is unable to obtain machine as we do not have the settings  Orders:    PAP DME Resupply/Reorder    Ambulatory Referral to Sleep Medicine; Future

## 2025-01-31 ENCOUNTER — TELEPHONE (OUTPATIENT)
Age: 60
End: 2025-01-31

## 2025-01-31 ENCOUNTER — PATIENT OUTREACH (OUTPATIENT)
Dept: FAMILY MEDICINE CLINIC | Facility: CLINIC | Age: 60
End: 2025-01-31

## 2025-01-31 NOTE — PROGRESS NOTES
OP TARIQ had received an email response Sunday Greenwood, Pharmacy Manager; Adriana Sterling,  [facility]; Thai No, Critical access hospital Health Nurse; Samantha Ross, [facility] access hospital Health Navigator; VITO Browning. Amanda had provided a coupon card for Advair which brought cost down to $35. Sunday responded that he applied coupon to medication. OP SW asked Christieverónica if assistance would be approved for patient. Adriana responded that yes assistance would be approved.     OP SW notes patient has aspirin, isosorbide and ticagrelor (Brilinta) so cannot refill at this time. OP SW notes medications were covered by ED. Sunday will have new gautam below:     Metoprolol tartrate: $10.45  Atorvastatin: $11.48  Spironolactone: $16.56  Metformin ER: $11.97  Losartan: $14.12  Advair HFA: $35  Albuterol HFA: $25.99    OP SW had called the patient. OP SW left a detailed voicemail regarding above. OP SW had called patient's spouse, Luli. OP SW left a detailed voicemail regarding the above as well. OP SW sent in basket to TALIA Caballero regarding update. JUSTIN POTTER will continue to f/u..

## 2025-01-31 NOTE — TELEPHONE ENCOUNTER
Pt's spouse Luli called regarding letter that was place yesterday.. Luli states that pt can return to work however advised per letter that pt is not to return to work. That he needs to follow up on 3/4. She states that pt's unemployment ran out and she needs a letter to submit to disability. Advised to submit the letter the was provided. Luli verbalized understanding.

## 2025-02-04 ENCOUNTER — PATIENT OUTREACH (OUTPATIENT)
Dept: FAMILY MEDICINE CLINIC | Facility: CLINIC | Age: 60
End: 2025-02-04

## 2025-02-06 ENCOUNTER — PATIENT OUTREACH (OUTPATIENT)
Dept: FAMILY MEDICINE CLINIC | Facility: CLINIC | Age: 60
End: 2025-02-06

## 2025-02-06 NOTE — PROGRESS NOTES
Incoming Call  02/06/2025    [facility] received phone call from Segun on this day.    Segun stated that he received phone call from his  from [facility]. Segun was approved for Medical Assistance and on Wednesday he have an appointment to finish SNAP application. Also he was told that medical insurance card will be received on the mail.    [facility] explained Segun that home visit for Tuesday will be cancelled and to please call [facility] on Wednesday for new updates.    As soon Segun received Medical Insurance Card [facility] will complete [employer] application.    [facility] will continue to follow up.    Next follow up was scheduled on 02/12/2025.

## 2025-02-13 ENCOUNTER — PATIENT OUTREACH (OUTPATIENT)
Dept: FAMILY MEDICINE CLINIC | Facility: CLINIC | Age: 60
End: 2025-02-13

## 2025-02-14 NOTE — PROGRESS NOTES
Outgoing Call  02/13/2025    [facility] called Segun on this day to follow up with his meeting at [facility].    Segun stated that he didn't make it to his appointment with  at [facility].    [facility] suggested to call [facility] and choose provider for his health insurance; phone number was provided.    Also [facility] suggested to place SNAP Benefits application over the phone; phone number provided.    [facility] explained that at the end of the call an application number will be given to him; also documents needs to be return on time. Segun stated that he will call tomorrow.    [facility] will continue to follow up.    Next outreach was scheduled on 02/21/2025.

## 2025-02-18 ENCOUNTER — PATIENT OUTREACH (OUTPATIENT)
Dept: FAMILY MEDICINE CLINIC | Facility: CLINIC | Age: 60
End: 2025-02-18

## 2025-02-21 ENCOUNTER — PATIENT OUTREACH (OUTPATIENT)
Dept: FAMILY MEDICINE CLINIC | Facility: CLINIC | Age: 60
End: 2025-02-21

## 2025-02-21 NOTE — PROGRESS NOTES
Outgoing Call  02/21/2025    [facility] called Segun on this day regarding SNAP, Medical Assistance and [employer].    Segun stated that he is not longer interested on applying for food stamps.    Regarding [employer] he still interested. [facility] explained that he needs his insurance card in order to [facility] place application. Segun stated that is not been received yet.    [facility] did promise check and Segun is approved for Medical Assistance but [facility] was unable to find who is the provided. [facility] will provide phone number to call and find out who is the health insurance provided and member ID in order to complete [employer] application.    [facility] will continue to follow up.    Next outreach was scheduled on 02/24/2025.

## 2025-02-24 ENCOUNTER — PATIENT OUTREACH (OUTPATIENT)
Dept: FAMILY MEDICINE CLINIC | Facility: CLINIC | Age: 60
End: 2025-02-24

## 2025-02-24 ENCOUNTER — OFFICE VISIT (OUTPATIENT)
Dept: FAMILY MEDICINE CLINIC | Facility: CLINIC | Age: 60
End: 2025-02-24

## 2025-02-24 VITALS
OXYGEN SATURATION: 97 % | TEMPERATURE: 98.7 F | WEIGHT: 198 LBS | HEIGHT: 66 IN | DIASTOLIC BLOOD PRESSURE: 80 MMHG | BODY MASS INDEX: 31.82 KG/M2 | SYSTOLIC BLOOD PRESSURE: 126 MMHG | RESPIRATION RATE: 18 BRPM | HEART RATE: 88 BPM

## 2025-02-24 DIAGNOSIS — J30.2 SEASONAL ALLERGIC RHINITIS, UNSPECIFIED TRIGGER: ICD-10-CM

## 2025-02-24 DIAGNOSIS — G47.00 INSOMNIA, UNSPECIFIED TYPE: Primary | ICD-10-CM

## 2025-02-24 DIAGNOSIS — I21.3 STEMI (ST ELEVATION MYOCARDIAL INFARCTION) (HCC): ICD-10-CM

## 2025-02-24 DIAGNOSIS — I21.4 NSTEMI (NON-ST ELEVATED MYOCARDIAL INFARCTION) (HCC): ICD-10-CM

## 2025-02-24 DIAGNOSIS — G56.01 CARPAL TUNNEL SYNDROME ON RIGHT: ICD-10-CM

## 2025-02-24 PROCEDURE — 99214 OFFICE O/P EST MOD 30 MIN: CPT

## 2025-02-24 RX ORDER — TRAZODONE HYDROCHLORIDE 50 MG/1
50 TABLET ORAL
Qty: 30 TABLET | Refills: 2 | Status: SHIPPED | OUTPATIENT
Start: 2025-02-24

## 2025-02-24 RX ORDER — METHYLPREDNISOLONE 4 MG/1
TABLET ORAL
Qty: 21 EACH | Refills: 0 | Status: SHIPPED | OUTPATIENT
Start: 2025-02-24

## 2025-02-24 RX ORDER — FLUTICASONE PROPIONATE 50 MCG
1 SPRAY, SUSPENSION (ML) NASAL DAILY
Qty: 15.8 ML | Refills: 5 | Status: SHIPPED | OUTPATIENT
Start: 2025-02-24

## 2025-02-24 RX ORDER — ATORVASTATIN CALCIUM 80 MG/1
80 TABLET, FILM COATED ORAL
Start: 2025-02-24

## 2025-02-24 RX ORDER — SPIRONOLACTONE 50 MG/1
50 TABLET, FILM COATED ORAL DAILY
Start: 2025-02-24 | End: 2025-05-25

## 2025-02-24 RX ORDER — ISOSORBIDE MONONITRATE 30 MG/1
30 TABLET, EXTENDED RELEASE ORAL DAILY
Qty: 30 TABLET | Refills: 11 | Status: SHIPPED | OUTPATIENT
Start: 2025-02-24

## 2025-02-24 RX ORDER — ASPIRIN 81 MG/1
81 TABLET, CHEWABLE ORAL DAILY
Qty: 30 TABLET | Refills: 11 | Status: SHIPPED | OUTPATIENT
Start: 2025-02-24

## 2025-02-24 RX ORDER — LOSARTAN POTASSIUM 50 MG/1
50 TABLET ORAL
Start: 2025-02-24 | End: 2025-05-25

## 2025-02-24 NOTE — ASSESSMENT & PLAN NOTE
No acute URI symptoms.  Will trial daily Flonase for nasal congestion.    Orders:    fluticasone (FLONASE) 50 mcg/act nasal spray; 1 spray into each nostril daily

## 2025-02-24 NOTE — PROGRESS NOTES
Outgoing Call  02/24/2025    [facility] called Segun on this day regarding L[employer] and Health Insurance.    Segun did not answer at this time. [facility] left voicemail to please call back.    [facility] will continue to follow.    Next follow up was scheduled on 02/26/2025.

## 2025-02-24 NOTE — PROGRESS NOTES
Name: Segun Alberts      : 1965      MRN: [id number]  Encounter Provider: MAREN Ramirez  Encounter Date: 2025   Encounter department: [facility]  :  Assessment & Plan  Insomnia, unspecified type  Will trial trazodone 50 mg at bedtime.  Medication use and side effects discussed.  Sleep hygiene discussed.    Orders:    traZODone (DESYREL) 50 mg tablet; Take 1 tablet (50 mg total) by mouth daily at bedtime as needed for sleep    Seasonal allergic rhinitis, unspecified trigger  No acute URI symptoms.  Will trial daily Flonase for nasal congestion.    Orders:    fluticasone (FLONASE) 50 mcg/act nasal spray; 1 spray into each nostril daily    Carpal tunnel syndrome on right  Positive Phalen test.  There is very mild swelling to the dorsal aspect of the hand and tenderness to palpation over the dorsal and palmar surfaces of the hand as well as the wrist.  Will trial a Medrol pack.  Advised wrist bracing and avoiding repetitive motions as well as specific exercises and stretches.  Written instructions given on AVS.  Orders:    methylPREDNISolone 4 MG tablet therapy pack; Use as directed on package           History of Present Illness       Segun Alberts is a 59 year old male with a history of HTN, HLD, asthma, T2DM, STEMI. He presents to the office today for difficulty sleeping.    At today's visit, he reports he is having difficulty sleeping.  He has a sleep medicine visit scheduled for May but he reports that he just cannot sleep and is wondering if there is something that can be done prior to his May appointment.  He states he needs a new CPAP machine as he has been without 1 and is asking if it is something I can get for him prior to his May appointment.  He is also complaining of nasal congestion and right hand and wrist pain.  He reports he previously had carpal tunnel syndrome on that right hand with a subsequent surgical intervention.  He states  "he has been pain-free for many years, but the pain has recently returned in the last couple of weeks.  He denies any recent injury.        Review of Systems   Constitutional:  Negative for chills and fever.   HENT:  Negative for ear pain and sore throat.    Eyes:  Negative for pain and visual disturbance.   Respiratory:  Negative for cough and shortness of breath.    Cardiovascular:  Negative for chest pain and palpitations.   Gastrointestinal:  Negative for abdominal pain and vomiting.   Genitourinary:  Negative for dysuria and hematuria.   Musculoskeletal:  Negative for arthralgias and back pain.   Skin:  Negative for color change and rash.   Neurological:  Negative for seizures and syncope.   Psychiatric/Behavioral:  Positive for sleep disturbance.    All other systems reviewed and are negative.      Objective   /80 (BP Location: Left arm, Patient Position: Sitting, Cuff Size: Large)   Pulse 88   Temp 98.7 °F (37.1 °C) (Temporal)   Resp 18   Ht 5' 6\" (1.676 m)   Wt 89.8 kg (198 lb)   SpO2 97%   BMI 31.96 kg/m²      Physical Exam  Vitals and nursing note reviewed.   Constitutional:       General: He is not in acute distress.     Appearance: He is well-developed.   HENT:      Head: Normocephalic and atraumatic.   Eyes:      Conjunctiva/sclera: Conjunctivae normal.   Cardiovascular:      Rate and Rhythm: Normal rate and regular rhythm.      Heart sounds: No murmur heard.  Pulmonary:      Effort: Pulmonary effort is normal. No respiratory distress.      Breath sounds: Normal breath sounds.   Abdominal:      Palpations: Abdomen is soft.      Tenderness: There is no abdominal tenderness.   Musculoskeletal:      Right hand: Swelling and tenderness present. No deformity. Decreased range of motion. Decreased strength. Normal sensation. Normal pulse.      Cervical back: Neck supple.   Skin:     General: Skin is warm and dry.      Capillary Refill: Capillary refill takes less than 2 seconds.   Neurological:     "  Mental Status: He is alert.   Psychiatric:         Mood and Affect: Mood normal.

## 2025-02-26 ENCOUNTER — PATIENT OUTREACH (OUTPATIENT)
Dept: FAMILY MEDICINE CLINIC | Facility: CLINIC | Age: 60
End: 2025-02-26

## 2025-02-26 NOTE — PROGRESS NOTES
Outgoing Call  02/26/2025    [facility] called Segun on this day regarding [employer].    Segun did not answer at this time. [facility] left voicemail to please call back.    Also [facility] sent contact letter.    [facility] will continue to follow up.    Next outreach was scheduled on 03/05/2025

## 2025-02-26 NOTE — LETTER
February 26, 2025     Segun Abdulmartita Alberts  [address] [facility] [address]      Dear Mr. Franko Alberts:    Please call ELIZABETH Gamez ([phone]) as soon as possible so that we may reschedule your appointment. If you have already rescheduled your appointment, please disregard this letter.         Sincerely,        Stacie Coker        CC: No Recipients

## 2025-03-04 ENCOUNTER — OFFICE VISIT (OUTPATIENT)
Dept: CARDIOLOGY CLINIC | Facility: CLINIC | Age: 60
End: 2025-03-04
Payer: COMMERCIAL

## 2025-03-04 VITALS
DIASTOLIC BLOOD PRESSURE: 60 MMHG | BODY MASS INDEX: 31.72 KG/M2 | HEART RATE: 86 BPM | SYSTOLIC BLOOD PRESSURE: 124 MMHG | WEIGHT: 197.4 LBS | HEIGHT: 66 IN

## 2025-03-04 DIAGNOSIS — G47.33 MODERATE OBSTRUCTIVE SLEEP APNEA: ICD-10-CM

## 2025-03-04 DIAGNOSIS — E78.5 DYSLIPIDEMIA: ICD-10-CM

## 2025-03-04 DIAGNOSIS — I51.9 LEFT VENTRICULAR SYSTOLIC DYSFUNCTION (LVSD): ICD-10-CM

## 2025-03-04 DIAGNOSIS — I25.10 CORONARY ARTERY DISEASE INVOLVING NATIVE CORONARY ARTERY OF NATIVE HEART WITHOUT ANGINA PECTORIS: Primary | ICD-10-CM

## 2025-03-04 DIAGNOSIS — J45.41 MODERATE PERSISTENT ASTHMA WITH EXACERBATION: ICD-10-CM

## 2025-03-04 DIAGNOSIS — I10 PRIMARY HYPERTENSION: ICD-10-CM

## 2025-03-04 DIAGNOSIS — E11.59 TYPE 2 DIABETES MELLITUS WITH OTHER CIRCULATORY COMPLICATION, WITHOUT LONG-TERM CURRENT USE OF INSULIN (HCC): ICD-10-CM

## 2025-03-04 PROCEDURE — 99214 OFFICE O/P EST MOD 30 MIN: CPT | Performed by: INTERNAL MEDICINE

## 2025-03-04 NOTE — ASSESSMENT & PLAN NOTE
Has significant dyslipidemia.  He is on high intensity statin therapy.    Will add ezetimibe 10 mg daily.  Should have follow-up blood work in about 3 months time.

## 2025-03-04 NOTE — ASSESSMENT & PLAN NOTE
Has some atypical symptoms of intermittent sharp chest pain.  Reports shortness of breath with activity and fatigue.  Blood pressure is well-controlled.  Physical examination is significant for increased BMI.  Clinically I did not appreciate pulmonary vascular congestion or peripheral edema.    Aspirin 81 mg daily + atorvastatin 80 mg daily + isosorbide mononitrate 30 mg daily + losartan 50 mg daily + metoprolol tartrate 50 mg twice daily + spironolactone 50 mg daily + ticagrelor 90 mg twice daily.    Blood work 1/26/2025 sodium 139 potassium 3.4 chloride 106 bicarb 24 BUN 24 creatinine 0.91 GFR 90 magnesium 1.9  Lipids 1/15/2025  Marked  HDL 43 Calc     For now he is advised to continue current medications.    Requesting blood work including chest x-ray, basic metabolic panel and a BNP level.  Requesting a limited echocardiogram to reassess cardiac structure and function.  Advising him to check blood pressures at home at different times.  A tracking sheet is being provided.  Will see him back in 4 weeks and reassess need for further evaluation.  Advised not to return back to work until he is seen in office for follow-up.    Orders:    Basic metabolic panel    B-Type Natriuretic Peptide(BNP); Future    Echo follow up/limited w/ contrast if indicated; Future    Ambulatory Referral to Pulmonology; Future

## 2025-03-04 NOTE — ASSESSMENT & PLAN NOTE
Lab Results   Component Value Date    HGBA1C 6.7 (H) 01/14/2025     Diabetes is well-controlled.

## 2025-03-04 NOTE — LETTER
March 4, 2025     Patient: Segun Alberts  YOB: 1965  Date of Visit: 3/4/2025      To Whom it May Concern:    Segun Alberts is under my professional care. Segun was seen in my office on 3/4/2025. Segun was hospitalized from January 14 - January 17 and again from January 24-January 26, 2025.  And seen in my office on 1/29/2025. Segun's active cardiovascular diagnoses include primary hypertension, recent STEMI involving LAD with subsequent coronary artery stent thrombosis (second STEMI), asthma, AMOL, type 2 diabetes & dyslipidemia.  Most especially, due to CAD with stable angina, Segun is presently disabled and continues to experience symptoms.  He is being ordered to undergo further testing with an echocardiogram and blood work for evaluation heart failure. He is advised not return to work until at least his next appointment with Cardiology, in 6 weeks.    If you have any questions or concerns, please don't hesitate to call.    Sincerely,          Maninder Mcguire MD        CC: No Recipients

## 2025-03-04 NOTE — PROGRESS NOTES
Name: Segun Alberts      : 1965      MRN: [id number]  Encounter Provider: Maninder Mcguire MD  Encounter Date: 3/4/2025   Encounter department: [facility] [place]    DIAGNOSES:  1. Coronary artery disease, history of NSTEMI, status post PCI of proximal LAD 2025, history of STEMI 2025 weight 100% occlusion due to stent thrombosis, status post PCI with thrombectomy and drug-eluting stent placement.  2. Hypertensive heart disease without heart failure, severe left ventricular hypertrophy  3. Diabetes mellitus  4. Obesity  5. Dyslipidemia  6. Asthma  7. Obstructive sleep apnea, on CPAP therapy    TTE 2025: Limited study.  Left ventricular ejection fraction 50%, moderate hypokinesis of anteroseptal wall from mid to apex and anterior wall mid to apex and apical wall.  Normal RV size and function, dilated LA, normal RV size, MAC, trivial pericardial effusion.    TTE (2025):  Severe concentric left ventricular hypertrophy, normal LV function EF 68%, grade 1 diastolic dysfunction, aortic valve sclerosis and mitral valve sclerosis, no significant stenosis or regurgitation.  No left or right atrial or RV enlargement, trace tricuspid valve regurgitation, no pulmonary hypertension.    CARDIAC CATH 2025: 100% occlusion of proximal LAD stent with thrombus. uccessful PCI with thrombectomy and placement of a 3.0 x 34 mm MAGALI post dilated to 3.5 mm.     CARDIAC CATH 2025:  Left main: Moderate size, angiographically normal.  LAD: Moderate size, severe diffuse disease throughout vessel.  95% proximal LAD stenosis, 50% mid LAD stenosis, distal LAD with 2 lesions 55 and 60%.  LCx: Moderate diffuse disease, 55% mid LCx stenosis.  RCA: Moderate size moderate diffuse disease throughout vessel.  RPDA moderate-sized with diffuse disease.  RPL B artery small with moderate diffuse disease.  Assessment & Plan  Coronary artery disease involving native coronary artery of native heart  without angina pectoris    Has some atypical symptoms of intermittent sharp chest pain.  Reports shortness of breath with activity and fatigue.  Blood pressure is well-controlled.  Physical examination is significant for increased BMI.  Clinically I did not appreciate pulmonary vascular congestion or peripheral edema.    Aspirin 81 mg daily + atorvastatin 80 mg daily + isosorbide mononitrate 30 mg daily + losartan 50 mg daily + metoprolol tartrate 50 mg twice daily + spironolactone 50 mg daily + ticagrelor 90 mg twice daily.    Blood work 1/26/2025 sodium 139 potassium 3.4 chloride 106 bicarb 24 BUN 24 creatinine 0.91 GFR 90 magnesium 1.9  Lipids 1/15/2025  Marked  HDL 43 Calc     For now he is advised to continue current medications.    Requesting blood work including chest x-ray, basic metabolic panel and a BNP level.  Requesting a limited echocardiogram to reassess cardiac structure and function.  Advising him to check blood pressures at home at different times.  A tracking sheet is being provided.  Will see him back in 4 weeks and reassess need for further evaluation.  Advised not to return back to work until he is seen in office for follow-up.    Orders:    Basic metabolic panel    B-Type Natriuretic Peptide(BNP); Future    Echo follow up/limited w/ contrast if indicated; Future    Ambulatory Referral to Pulmonology; Future    Left ventricular systolic dysfunction (LVSD)  Echocardiogram during last hospitalization showed decreased left ventricular systolic function with regional wall motion abnormality consistent with acute MI.  Needs follow-up limited echocardiogram to reassess LV dysfunction status.  Orders:    Echo follow up/limited w/ contrast if indicated; Future    Ambulatory Referral to Pulmonology; Future    Primary hypertension  Blood pressure is reasonably well-controlled.  Evaluation and plan as outlined above.       Type 2 diabetes mellitus with other circulatory complication,  without long-term current use of insulin ([facility])    Lab Results   Component Value Date    HGBA1C 6.7 (H) 01/14/2025     Diabetes is well-controlled.       Body mass index (BMI) 32.0-32.9, adult  Encouraging to eat healthy and avoid carbs and fatty foods.       Dyslipidemia  Has significant dyslipidemia.  He is on high intensity statin therapy.    Will add ezetimibe 10 mg daily.  Should have follow-up blood work in about 3 months time.       Moderate obstructive sleep apnea  Has untreated sleep apnea which is contributing to his symptoms.  Has appointment for evaluation in June 2025.         Moderate persistent asthma with exacerbation  Has moderate persistent asthma symptoms.  Patient reports recent increase in symptoms.  Advise close follow-up with primary care physician and also see pulmonologist.  Orders:    Basic metabolic panel    B-Type Natriuretic Peptide(BNP); Future    XR chest pa and lateral; Future    Ambulatory Referral to Pulmonology; Future        History of Present Illness   HPI  Segun Alberts is a 59 y.o. male who presents regarding follow-up of his coronary artery disease, severe LVH, hypertensive heart disease without heart failure, dyslipidemia, sleep apnea and other comorbidities.  He was last seen in cardiology office in January 2025.    History obtained from: patient    He is here for visit accompanied with his wife.  He mentioned that he has been dealing with chronic shortness of breath with activity and some wheezing.  Also reports intermittent sharp pain in the chest and the precordial region and at times on the right side of the chest.  Denies palpitations or presyncope/syncope.  Reports that he has not been able to pay his rent and he has no source of income and he has to find a job.  Reports shortness of breath with mild to moderate activity.  Denies orthopnea PND or worsening pedal edema.  Has had no passing out or near passing out episodes.  Reports being compliant with  "medications.  Does not use CPAP as he does not have 1.  Mentions that his sleep doctors appointment is scheduled for June.  Reports that he just received his medical insurance.    Review of Systems   Constitutional:  Positive for fatigue.   HENT: Negative.     Respiratory:  Positive for cough, chest tightness, shortness of breath and wheezing.    Cardiovascular:  Positive for chest pain.   Gastrointestinal: Negative.    Endocrine: Negative.    Genitourinary: Negative.    Skin: Negative.    Neurological: Negative.    Hematological: Negative.    Psychiatric/Behavioral: Negative.            Objective   /60 (BP Location: Right arm, Patient Position: Sitting)   Pulse 86   Ht 5' 6\" (1.676 m)   Wt 89.5 kg (197 lb 6.4 oz)   BMI 31.86 kg/m²      Physical Exam  Constitutional:       General: He is not in acute distress.     Appearance: He is obese. He is not ill-appearing.   HENT:      Head: Atraumatic.      Mouth/Throat:      Mouth: Mucous membranes are moist.   Cardiovascular:      Rate and Rhythm: Normal rate and regular rhythm.      Heart sounds: Normal heart sounds. No murmur heard.  Pulmonary:      Effort: Pulmonary effort is normal.      Breath sounds: Examination of the right-upper field reveals decreased breath sounds. Examination of the left-upper field reveals decreased breath sounds. Examination of the right-middle field reveals decreased breath sounds. Examination of the left-middle field reveals decreased breath sounds. Examination of the right-lower field reveals decreased breath sounds and rhonchi. Examination of the left-lower field reveals decreased breath sounds and rhonchi. Decreased breath sounds and rhonchi present. No wheezing or rales.   Abdominal:      Palpations: Abdomen is soft.   Skin:     General: Skin is warm and dry.      Capillary Refill: Capillary refill takes more than 3 seconds.   Neurological:      Mental Status: He is oriented to person, place, and time. Mental status is at " baseline.   Psychiatric:         Mood and Affect: Mood normal.         Behavior: Behavior normal.

## 2025-03-04 NOTE — ASSESSMENT & PLAN NOTE
Has untreated sleep apnea which is contributing to his symptoms.  Has appointment for evaluation in June 2025.

## 2025-03-05 ENCOUNTER — PATIENT OUTREACH (OUTPATIENT)
Dept: FAMILY MEDICINE CLINIC | Facility: CLINIC | Age: 60
End: 2025-03-05

## 2025-03-05 NOTE — PROGRESS NOTES
Chart Review  03/05/2025    Pr chart Review [facility] lost contact with Segun.    [facility] called few times; and sent contact letter on 02/26/2025. No answers have been received.    [facility] will closed this referral on my behalf today 03/05/2025 due lost of communication.    No follow up was scheduled at this time.

## 2025-03-06 ENCOUNTER — PATIENT OUTREACH (OUTPATIENT)
Dept: FAMILY MEDICINE CLINIC | Facility: CLINIC | Age: 60
End: 2025-03-06

## 2025-03-06 NOTE — PROGRESS NOTES
OP SW had received an in basket from SUSIE Coker yesterday. Per in basket, case closed due to lack of communication from patient. OP SW responded to Stacie. OP SW closed case. Please reconsult as needed.

## 2025-03-10 ENCOUNTER — HOSPITAL ENCOUNTER (OUTPATIENT)
Dept: RADIOLOGY | Facility: HOSPITAL | Age: 60
Discharge: HOME | End: 2025-03-10
Payer: COMMERCIAL

## 2025-03-10 DIAGNOSIS — J45.41 MODERATE PERSISTENT ASTHMA WITH EXACERBATION: ICD-10-CM

## 2025-03-10 PROCEDURE — 71046 X-RAY EXAM CHEST 2 VIEWS: CPT

## 2025-03-12 ENCOUNTER — APPOINTMENT (OUTPATIENT)
Dept: LAB | Facility: CLINIC | Age: 60
End: 2025-03-12
Payer: COMMERCIAL

## 2025-03-12 DIAGNOSIS — J45.41 MODERATE PERSISTENT ASTHMA WITH EXACERBATION: ICD-10-CM

## 2025-03-12 DIAGNOSIS — I25.10 CORONARY ARTERY DISEASE INVOLVING NATIVE CORONARY ARTERY OF NATIVE HEART WITHOUT ANGINA PECTORIS: ICD-10-CM

## 2025-03-12 LAB
ANION GAP SERPL CALCULATED.3IONS-SCNC: 11 MMOL/L (ref 4–13)
BNP SERPL-MCNC: 251 PG/ML (ref 0–100)
BUN SERPL-MCNC: 17 MG/DL (ref 5–25)
CALCIUM SERPL-MCNC: 9.1 MG/DL (ref 8.4–10.2)
CHLORIDE SERPL-SCNC: 103 MMOL/L (ref 96–108)
CO2 SERPL-SCNC: 24 MMOL/L (ref 21–32)
CREAT SERPL-MCNC: 1.01 MG/DL (ref 0.6–1.3)
GFR SERPL CREATININE-BSD FRML MDRD: 79 ML/MIN/1.73SQ M
GLUCOSE SERPL-MCNC: 119 MG/DL (ref 65–140)
POTASSIUM SERPL-SCNC: 4.2 MMOL/L (ref 3.5–5.3)
SODIUM SERPL-SCNC: 138 MMOL/L (ref 135–147)

## 2025-03-12 PROCEDURE — 80048 BASIC METABOLIC PNL TOTAL CA: CPT | Performed by: INTERNAL MEDICINE

## 2025-03-12 PROCEDURE — 36415 COLL VENOUS BLD VENIPUNCTURE: CPT | Performed by: INTERNAL MEDICINE

## 2025-03-12 PROCEDURE — 83880 ASSAY OF NATRIURETIC PEPTIDE: CPT

## 2025-03-12 NOTE — PROGRESS NOTES
"CARDIAC REHABILITATION   ASSESSMENT AND INDIVIDUALIZED TREATMENT PLAN  INITIAL           Today's date: 3/13/2025   # of Exercise Sessions Completed: 1-Evaluation  Patient name: Segun Alberts      : 1965  Age: 59 y.o.       MRN: [id number]  Referring Physician: Gerson Huizar MD  Cardiologist: Maninder Mcguire MD  Provider: [place]  Clinician: Salma Harley MS, CEP        Treatment is tailored to this patient's individual needs.  The ITP was reviewed with the patient and all questions were answered to their satisfaction.  Additional ITP documentation can be found electronically including daily and monthly exercise summaries, daily session notes with ECG summaries, education notes, daily medication reconciliation, and daily physician supervision.      INITIAL EVALUATION SUMMARY 2025    Patient's subjective report of progress/symptoms: Reports feeling fatigue easily and CHUN mostly when walking or doing steps. Denies chest pain, which he experienced prior   Home exercise/ADLs: Has resumed all ADLs, limited by fatigue and CHUN. Does not complete exercise on his own  Clinical Comments: Has pulmonary consult 3/24; Plans to walk to and from cardiac rehab, which takes about 20-30 mins. Concerned because he is \"scared\" to have another MI    Initial Fitness Assessment: Submaximal TM ETT:  Resting:  BP: 110/78  HR: 86  Dyspnea: 0/10  Exercise:  BP: 134/74  HR: 118  Dyspnea: 5/10  METs:  3.1  ECG Summary: NSR  Symptoms: CHUN  Test terminated at:  RPE 6        Dx:   Encounter Diagnoses   Name Primary?    STEMI (ST elevation myocardial infarction) (HCC) Yes    ST elevation myocardial infarction (STEMI), unspecified artery (HCC)        Description of Diagnosis: Coronary artery disease, history of NSTEMI, status post PCI of proximal LAD 2025, history of STEMI 2025 weight 100% occlusion due to stent thrombosis, status post PCI with thrombectomy and drug-eluting stent placement.   Date of onset: " 1/16/25  Other Cardiac History: DM, HTN, HLD; residual 50% mid LAD, 55% & 60% distal LAD, 55% mid Lcx, moderate diffuse disease RCA        ASSESSMENT    Medical History:   Past Medical History:   Diagnosis Date    Asthma     Hyperlipidemia     Hypertension     Sinus congestion        Family History:  No family history on file.    Allergies:   Patient has no known allergies.    Current Medications:   Current Outpatient Medications   Medication Sig Dispense Refill    albuterol (PROVENTIL HFA,VENTOLIN HFA) 90 mcg/act inhaler Inhale 1 puff every 6 (six) hours as needed for wheezing 8.5 g 5    aspirin 81 mg chewable tablet Chew 1 tablet (81 mg total) daily 30 tablet 11    atorvastatin (LIPITOR) 80 mg tablet Take 1 tablet (80 mg total) by mouth daily with dinner      Blood Pressure Monitoring ([employer] BP Monitor/Arm) KELLY Use daily as needed (dizziness) 1 each 0    fluticasone (FLONASE) 50 mcg/act nasal spray 1 spray into each nostril daily 15.8 mL 5    fluticasone-salmeterol (Advair HFA) 230-21 MCG/ACT inhaler Inhale 2 puffs 2 (two) times a day Rinse mouth after use. 24 g 3    isosorbide mononitrate (IMDUR) 30 mg 24 hr tablet Take 1 tablet (30 mg total) by mouth daily 30 tablet 11    losartan (COZAAR) 50 mg tablet Take 1 tablet (50 mg total) by mouth daily after lunch      metFORMIN (GLUCOPHAGE-XR) 500 mg 24 hr tablet Take 1 tablet (500 mg total) by mouth daily with dinner 90 tablet 0    methylPREDNISolone 4 MG tablet therapy pack Use as directed on package (Patient not taking: Reported on 3/4/2025) 21 each 0    metoprolol tartrate (LOPRESSOR) 25 mg tablet Take 2 tablets (50 mg total) by mouth every 12 (twelve) hours 120 tablet 11    spironolactone (ALDACTONE) 50 mg tablet Take 1 tablet (50 mg total) by mouth daily      ticagrelor (BRILINTA) 90 MG Take 1 tablet (90 mg total) by mouth every 12 (twelve) hours 60 tablet 11    traZODone (DESYREL) 50 mg tablet Take 1 tablet (50 mg total) by mouth daily at bedtime as  "needed for sleep 30 tablet 2     No current facility-administered medications for this visit.       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  Recreation/Physical Activity: walks to appointments around [place]  ADL’s:Capable of performing light to moderate ADLs limited by Dyspnea  fatigue  [place]: No limitations  Home exercise: none      SMART Exercise Goals:   10% improvement in functional capacity based on max METs achieved in initial fitness assessment  reduced dyspnea with physical activity    improved DASI score by 10%  increased exercise capacity by 40% based on peak METs tolerated in cardiac rehab exercise session    Patient Specific EXERCISE GOALS:       Decrease SOB with walking  Return to work/find work  Know what his heart/body can handle    Functional Capacity Screening Tool:  [facility] Activity Status Index:  3.63 METs    NUTRITION ASSESSMENT:    Initial Weight:  199.6 lb  Current Weight:     Height:   Ht Readings from Last 1 Encounters:   03/04/25 5' 6\" (1.676 m)       Diabetes: T2D, Pt not required to monitor home blood sugar  A1c: 6.7%    last measured: 1/14/25    Lipid management: Discussed diet and lipid management and Last lipid profile 1/15/25  Chol 221    HDL 43      Current Dietary Habits:  Reports difficulty with making things taste good without salt. His girlfriend has helped him with his diet/choosing better foods. Eats a lot of white rice. Also has difficulty with fresh foods because of " cost    SMART Nutrition Goals:   2.5-5%  wt loss, eat whole grain breads, brown rice and whole grain cereals, choose healthy snacks such as fruit, pretzels, and low fat crackers, choose healthy desserts and sweets such as dayron food cake or  fruit, and choose low sodium canned, frozen/packaged foods or rarely/never eat    Patient Specific NUTRITION GOALS:     1. Lose weight   2. Get Mrs. Dash salt free seasoning   3. Switch from white rice to brown rice    Drug/Alcohol Use:   No      PSYCHOSOCIAL ASSESSMENT:    Date of last Assessment:  3/13/25  Depression screening:  PHQ-9 = 9    Interpretation:  5-9 = Mild Depression  Anxiety screening:  JUAN A-7 = 9    Interpretation: 5-9 = Mild anxiety    Pt self-report of depression and anxiety   Pt reports some feelings of depression and anxiety due to being in his room all the time, does not get out much to do things. Also reports feeling scared to have another MI; has great support from his girlfriend.     Self-reported stress level:  5   Stressors: health, work, finances  Stress Management Tools: practice Relaxation Techniques, exercise, enjoy a hobby, and find work    SMART Psychosocial Goals:     Reduce perceived stress to 1-3/10, improved [facility] QOL < 27, PHQ-9 - reduced severity by one level, Physical Fitness in [facility] Score < 3, Social Support in [facility] Score < 3, Daily Activity in [facility] Score < 3, Pain in [facility] Score < 3, Overall Health in [facility] Score < 3, Quality of Life in [facility] Score < 3 , and Change in Health in [facility] Score < 3     Patient Specific PSYCHOCOSOCIAL GOALS:    Decrease feelings of depression from being inside all the time (self isolating)  Decrease anxiety related to fear of having another MI  Find work to reduce financial burden    Quality of Life Screen:  (Higher score indicates disease impact on QOL)  [facility] COOP score: 33/45     Social Support:   significant other  Community/Social Activities: None     Psychosocial  Assessment as it relates to rehabilitation:   Pt reports struggling financially, without work. Does not have a car so he relies on walking to get to and from cardiac rehab      OTHER CORE COMPONENT ASSESSMENT:    Tobacco Use:     N/A:  Patient is a non-smoker     Anginal Symptoms:  chest pressure, CHUN, and excessive fatigue   NTG use: No prescription    SMART Goals:   consistent, controlled resting BP < 130/80 and medication compliance    Patient Specific CORE COMPONENT GOALS:    Reduce CHUN  Normal resting BP/check daily at home      INDIVIDUALIZED TREATMENT PLAN      EXERCISE GOALS and PLAN      Progress toward Exercise goals:   Reviewed Pt goals and determined plan of care    Exercise Plan:    home exercise 30+ mins 2 days opposite CR and Group class: Risk Factors for Heart Disease    The patient was counseled on exercise guidelines to achieve a minimum of 150 mins/wk of moderate intensity (RPE 4-6)   exercise and encouraged to add 1-2 days of exercise on opposite days of cardiac rehab as tolerated.       PHYSICIAN PRESCRIBED EXERCISE:    Current Aerobic Exercise Prescription:      Frequency: 3 days/week   Supplement with home exercise 2+ days/wk as tolerated       Minutes: 30         METS: 2.0-3.0            HR: 50-85% HRR:  123-148   RPE: 4-6         Modalities: Treadmill, UBE, and NuStep     Exercise workloads will be progressed gradually as tolerated, within limits of patient's ability, and according to the patient's   response to the exercise program.      Aerobic Exercise Prescription Plan for Progression   Frequency: 3 days/week of cardiac rehab       Supplement with home exercise 2+ days/wk as tolerated    Minutes: 40       >150 mins/wk of moderate intensity exercise   METS: 2.5-3.5   HR: 50-85% HRR:  123-148      RPE: 4-6   Modalities: Treadmill, UBE, Lifecycle, and NuStep    Strength trainin-3 days / week  12-15 repetitions  1-2 sets per modality   Will be added following 2-3 weeks of monitored  exercise sessions   Modalities: Leg Press, Chest Press, Arm Extension, Arm Curl, Shoulder Shrugs, and Calf Raises    Home Exercise: none    Exercise Education: benefit of exercise for CAD risk factors and RPE scale     Readiness to change: Preparation:  (Getting ready to change)       NUTRITION GOALS AND PLAN      Nutritional   Reviewed patient's Rate your Plate. Discussed key elements of heart healthy eating. Reviewed patient goals for dietary modifications and their clinical implications.  Reviewed most recent lipid profile.     Patient's progress toward Nutrition goals:    Reviewed Pt goals and determined plan of care      Nutrition Plan:   group class: Reading Food Labels, group Class: Heart Healthy Eating, increase intake of whole grains, replace refined flours with whole grains, rarely eat frozen desserts, eat healthy snacks like fruit, pretzels, and low fat crackers, choose desserts such as fruit, dayron food cake, low-fat or fat-free sweets, and choose low sodium canned, frozen, packaged foods or rarely eat these foods    Measurable goals were based Rate Your Plate Dietary Self-Assessment. These are the areas in which the patient could score higher on the assessment.  Goals include recommendations for a heart healthy diet based on American Heart Association.    Nutrition Education:   heart healthy eating principles  low sodium diet    Readiness to change: Preparation:  (Getting ready to change)       PSYCHOSOCIAL GOALS AND PLAN    Psychosocial Assessment as it relates to rehabilitation:   Patient denies issues with his/her family or home life that may affect their rehabilitation efforts.     Patient's progress toward Psychosocial goals:    Reviewed Pt goals and determined plan of care    Psychosocial Intervention/plan:   Class: Stress and Your Health, Practice relaxation techniques, Exercise, and Spend time outdoors    Psychosocial Education: signs/sxs of depression, benefits of a positive support system, and  stress management techniques    Information to utilize [employer] was provided as well as contact information for counseling through  Behavioral Health and group psychotherapy groups available.    Readiness to change: Preparation:  (Getting ready to change)       OTHER CORE COMPONENTS GOALS and PLAN      Blood Pressure will be monitored throughout the program and cardiologist will be notified of elevated trends.    Pt will be encouraged to monitor home BP if advised by cardiologist.    Tobacco Plan:   N/A:  Pt is a non-smoker    Progress toward Core Component goals:   Reviewed Pt goals and determined plan of care    Other Core Components Intervention:   group class: Understanding Heart Disease, group class: Common Heart Medications, medication compliance, engage in regular exercise, check labels for sodium content, and monitor home BP    Group and Individual Education:  understanding high blood pressure and it's relationship to CAD and appropriate BP response to exercise    Readiness to change: Preparation:  (Getting ready to change)

## 2025-03-13 ENCOUNTER — CLINICAL SUPPORT (OUTPATIENT)
Dept: CARDIAC REHAB | Facility: CLINIC | Age: 60
End: 2025-03-13
Payer: COMMERCIAL

## 2025-03-13 DIAGNOSIS — I21.3 STEMI (ST ELEVATION MYOCARDIAL INFARCTION) (HCC): Primary | ICD-10-CM

## 2025-03-13 DIAGNOSIS — I21.3 ST ELEVATION MYOCARDIAL INFARCTION (STEMI), UNSPECIFIED ARTERY (HCC): ICD-10-CM

## 2025-03-13 PROCEDURE — 93797 PHYS/QHP OP CAR RHAB WO ECG: CPT

## 2025-03-16 ENCOUNTER — RESULTS FOLLOW-UP (OUTPATIENT)
Dept: CARDIOLOGY CLINIC | Facility: CLINIC | Age: 60
End: 2025-03-16

## 2025-03-17 ENCOUNTER — APPOINTMENT (OUTPATIENT)
Dept: CARDIAC REHAB | Facility: CLINIC | Age: 60
End: 2025-03-17
Payer: COMMERCIAL

## 2025-03-17 ENCOUNTER — TELEPHONE (OUTPATIENT)
Dept: FAMILY MEDICINE CLINIC | Facility: CLINIC | Age: 60
End: 2025-03-17

## 2025-03-17 NOTE — TELEPHONE ENCOUNTER
IEB FORM RECEIVED ON 3/17/2025  GIVEN TO MANAV CASTILLO TO BE COMPLETED, SIGNED BY MD/ (INCLUDE LISC. NUMBER), AND FAXED BACK IN 3 DAYS       LETTER ATTACHED IS TO BE ALSO FAXED WHEN FORM IS COMPLETED

## 2025-03-18 NOTE — TELEPHONE ENCOUNTER
FORM COMPLETED, SIGNED BY MD/ (INCLUDE LISC. NUMBER) FAXED ON 03/18/25 TO [facility]ILLIP at [phone]. FAX CONFIRMATION RECEIVED. FORM GIVEN TO NOEMI TO SCAN

## 2025-03-19 ENCOUNTER — TELEPHONE (OUTPATIENT)
Dept: CARDIAC REHAB | Facility: CLINIC | Age: 60
End: 2025-03-19

## 2025-03-19 ENCOUNTER — APPOINTMENT (OUTPATIENT)
Dept: CARDIAC REHAB | Facility: CLINIC | Age: 60
End: 2025-03-19
Payer: COMMERCIAL

## 2025-03-21 ENCOUNTER — APPOINTMENT (OUTPATIENT)
Dept: CARDIAC REHAB | Facility: CLINIC | Age: 60
End: 2025-03-21
Payer: COMMERCIAL

## 2025-03-24 ENCOUNTER — TELEPHONE (OUTPATIENT)
Dept: FAMILY MEDICINE CLINIC | Facility: CLINIC | Age: 60
End: 2025-03-24

## 2025-03-24 ENCOUNTER — CLINICAL SUPPORT (OUTPATIENT)
Dept: CARDIAC REHAB | Facility: CLINIC | Age: 60
End: 2025-03-24
Payer: COMMERCIAL

## 2025-03-24 DIAGNOSIS — I21.3 ST ELEVATION MYOCARDIAL INFARCTION (STEMI), UNSPECIFIED ARTERY (HCC): Primary | ICD-10-CM

## 2025-03-24 PROCEDURE — 93798 PHYS/QHP OP CAR RHAB W/ECG: CPT

## 2025-03-24 NOTE — TELEPHONE ENCOUNTER
Since he has already been referred, he simply needs to call the pulmonology office and ask to be scheduled with a provider that is closer to his home. I am not sure of all the different offices pulmonology has. That may be the closest one. But changing offices doesn't require a new referral. Thank you.

## 2025-03-24 NOTE — TELEPHONE ENCOUNTER
Patient is requesting a referral for pulmonology for one closer to here like [place] because patient is unable to make it to the one in [facility]. Please Advise.

## 2025-03-26 ENCOUNTER — OFFICE VISIT (OUTPATIENT)
Dept: DENTISTRY | Facility: CLINIC | Age: 60
End: 2025-03-26

## 2025-03-26 ENCOUNTER — OFFICE VISIT (OUTPATIENT)
Dept: FAMILY MEDICINE CLINIC | Facility: CLINIC | Age: 60
End: 2025-03-26

## 2025-03-26 ENCOUNTER — CLINICAL SUPPORT (OUTPATIENT)
Dept: CARDIAC REHAB | Facility: CLINIC | Age: 60
End: 2025-03-26
Payer: COMMERCIAL

## 2025-03-26 ENCOUNTER — HOSPITAL ENCOUNTER (OUTPATIENT)
Dept: NON INVASIVE DIAGNOSTICS | Facility: HOSPITAL | Age: 60
Discharge: HOME | End: 2025-03-26
Attending: INTERNAL MEDICINE
Payer: COMMERCIAL

## 2025-03-26 VITALS — TEMPERATURE: 97.7 F | HEART RATE: 97 BPM | DIASTOLIC BLOOD PRESSURE: 74 MMHG | SYSTOLIC BLOOD PRESSURE: 122 MMHG

## 2025-03-26 VITALS
DIASTOLIC BLOOD PRESSURE: 74 MMHG | SYSTOLIC BLOOD PRESSURE: 122 MMHG | HEART RATE: 85 BPM | WEIGHT: 197 LBS | HEIGHT: 66 IN | BODY MASS INDEX: 31.66 KG/M2

## 2025-03-26 VITALS
WEIGHT: 195 LBS | BODY MASS INDEX: 31.34 KG/M2 | SYSTOLIC BLOOD PRESSURE: 112 MMHG | HEART RATE: 98 BPM | HEIGHT: 66 IN | TEMPERATURE: 97.8 F | DIASTOLIC BLOOD PRESSURE: 70 MMHG | RESPIRATION RATE: 18 BRPM | OXYGEN SATURATION: 99 %

## 2025-03-26 DIAGNOSIS — Z71.89 ENCOUNTER FOR MEDICATION REVIEW AND COUNSELING: Primary | ICD-10-CM

## 2025-03-26 DIAGNOSIS — I25.10 CORONARY ARTERY DISEASE INVOLVING NATIVE CORONARY ARTERY OF NATIVE HEART WITHOUT ANGINA PECTORIS: ICD-10-CM

## 2025-03-26 DIAGNOSIS — Z59.82 TRANSPORTATION INSECURITY: ICD-10-CM

## 2025-03-26 DIAGNOSIS — I21.3 ST ELEVATION MYOCARDIAL INFARCTION (STEMI), UNSPECIFIED ARTERY (HCC): Primary | ICD-10-CM

## 2025-03-26 DIAGNOSIS — I51.9 LEFT VENTRICULAR SYSTOLIC DYSFUNCTION (LVSD): ICD-10-CM

## 2025-03-26 DIAGNOSIS — Z01.21 ENCOUNTER FOR DENTAL EXAMINATION AND CLEANING WITH ABNORMAL FINDINGS: Primary | ICD-10-CM

## 2025-03-26 DIAGNOSIS — I51.89 LEFT VENTRICULAR SYSTOLIC DYSFUNCTION (LVSD): ICD-10-CM

## 2025-03-26 LAB
BSA FOR ECHO PROCEDURE: 1.99 M2
E WAVE DECELERATION TIME: 244 MS
E/A RATIO: 0.64
LV EF US.2D.A4C+ESTIMATED: 50 %
MV E'TISSUE VEL-SEP: 5 CM/S
MV PEAK A VEL: 0.89 M/S
MV PEAK E VEL: 57 CM/S
MV STENOSIS PRESSURE HALF TIME: 71 MS
MV VALVE AREA P 1/2 METHOD: 3.1
SL CV LV EF: 50

## 2025-03-26 PROCEDURE — 93308 TTE F-UP OR LMTD: CPT | Performed by: INTERNAL MEDICINE

## 2025-03-26 PROCEDURE — 93321 DOPPLER ECHO F-UP/LMTD STD: CPT | Performed by: INTERNAL MEDICINE

## 2025-03-26 PROCEDURE — 93325 DOPPLER ECHO COLOR FLOW MAPG: CPT

## 2025-03-26 PROCEDURE — 93325 DOPPLER ECHO COLOR FLOW MAPG: CPT | Performed by: INTERNAL MEDICINE

## 2025-03-26 PROCEDURE — 3078F DIAST BP <80 MM HG: CPT

## 2025-03-26 PROCEDURE — D0140 LIMITED ORAL EVALUATION - PROBLEM FOCUSED: HCPCS | Performed by: DENTIST

## 2025-03-26 PROCEDURE — 93798 PHYS/QHP OP CAR RHAB W/ECG: CPT

## 2025-03-26 PROCEDURE — 93308 TTE F-UP OR LMTD: CPT

## 2025-03-26 PROCEDURE — 3074F SYST BP LT 130 MM HG: CPT

## 2025-03-26 PROCEDURE — 99213 OFFICE O/P EST LOW 20 MIN: CPT

## 2025-03-26 PROCEDURE — D0220 INTRAORAL - PERIAPICAL FIRST RADIOGRAPHIC IMAGE: HCPCS | Performed by: DENTIST

## 2025-03-26 PROCEDURE — 93321 DOPPLER ECHO F-UP/LMTD STD: CPT

## 2025-03-26 RX ORDER — ACETAMINOPHEN 500 MG
500 TABLET ORAL EVERY 6 HOURS PRN
Qty: 30 TABLET | Refills: 0 | Status: SHIPPED | OUTPATIENT
Start: 2025-03-26

## 2025-03-26 RX ORDER — AMOXICILLIN 500 MG/1
500 CAPSULE ORAL EVERY 8 HOURS SCHEDULED
Qty: 21 CAPSULE | Refills: 0 | Status: SHIPPED | OUTPATIENT
Start: 2025-03-26 | End: 2025-04-02

## 2025-03-26 SDOH — ECONOMIC STABILITY - TRANSPORTATION SECURITY: TRANSPORTATION INSECURITY: Z59.82

## 2025-03-26 NOTE — PROGRESS NOTES
Name: Segun Alberts      : 1965      MRN: [id number]  Encounter Provider: MAREN Ramirez  Encounter Date: 3/26/2025   Encounter department: [facility]  :  Assessment & Plan  Encounter for medication review and counseling  Patient reports he was seen for an urgent visit today at the  dentist for a dental abscess.  He has a tooth requiring extraction.  He he was prescribed amoxicillin for abscess and scheduled for tooth extraction next week.  He presented to the office today to request permission to take the amoxicillin and to have the tooth extraction done.  I advised him that he is able to take the amoxicillin with his current medical conditions and medications.  I also advised him that he is able to have his tooth extracted with the planned local anesthesia.  However, he is not to stop his DAPT.  Per patient request, a letter was written outlining this information.  Patient verbalized understanding.       Transportation insecurity  Patient reports he is not working at this time and finances are limited.  He is able to make it to local appointments by walking, but appointments that are farther away he misses due to lack of transportation.  Will refer to the social work program to see if his insurance company can assist him with transportation for medical appointments.    Orders:    Ambulatory Referral to Social Work Care Management Program; Future           History of Present Illness         Segun Alberts is a 59 year old male with a history of HTN, HLD, asthma, T2DM, STEMI. He presents to the office today to discuss a medication question.     Patient reports he was seen for an urgent visit today at the  dentist for a dental abscess.  He has a tooth requiring extraction.  He he was prescribed amoxicillin for abscess and scheduled for tooth extraction next week.  He presented to the office today to request permission to take the amoxicillin and to  "have the tooth extraction done.    He also reports that he is not working at this time and finances are limited.  He is able to make it to local appointments by walking, but appointments that are farther away he misses due to lack of transportation.  He is asking if there is any assistance available for transportation to his appointments.      Review of Systems   HENT:  Positive for dental problem.    Respiratory:  Positive for shortness of breath. Negative for cough.    Cardiovascular:  Negative for chest pain, palpitations and leg swelling.       Objective   /70 (BP Location: Right arm, Patient Position: Sitting, Cuff Size: Standard)   Pulse 98   Temp 97.8 °F (36.6 °C) (Temporal)   Resp 18   Ht 5' 6\" (1.676 m)   Wt 88.5 kg (195 lb)   SpO2 99%   BMI 31.47 kg/m²      Physical Exam  Vitals and nursing note reviewed.   Constitutional:       General: He is not in acute distress.     Appearance: He is well-developed.   HENT:      Head: Normocephalic and atraumatic.   Eyes:      Conjunctiva/sclera: Conjunctivae normal.   Cardiovascular:      Rate and Rhythm: Normal rate and regular rhythm.      Heart sounds: No murmur heard.  Pulmonary:      Effort: Pulmonary effort is normal. No respiratory distress.      Breath sounds: Normal breath sounds.   Abdominal:      Palpations: Abdomen is soft.      Tenderness: There is no abdominal tenderness.   Musculoskeletal:         General: No swelling.      Cervical back: Neck supple.   Skin:     General: Skin is warm and dry.      Capillary Refill: Capillary refill takes less than 2 seconds.   Neurological:      Mental Status: He is alert.   Psychiatric:         Mood and Affect: Mood normal.       "

## 2025-03-26 NOTE — LETTER
March 26, 2025     Patient: Segun Alberts  YOB: 1965  Date of Visit: 3/26/2025      To Whom it May Concern:    Segun Alberts is under my professional care. Segun was seen in my office on 3/26/2025. Segun may take the prescribed amoxicillin antibiotic for dental abscess. He is also able to have local anesthesia for tooth extraction. He must however continue his dual antiplatelet therapy.    If you have any questions or concerns, please don't hesitate to call.         Sincerely,          MAREN Ramirez        CC: No Recipients

## 2025-03-26 NOTE — PROGRESS NOTES
"Utilized /translation ID: No  59 y.o. male patient presents to [facility] for a Limited exam.     ASA : I  Pain Level : 3      PMH: Reviewed health history no change-     Chief complaint: \"swollen UL\"  HPI: 122/74    Consent:  Discussed that limited exam focuses on problem area, and same day tx is not guaranteed.  Patient explained to if they wish to have anything else evaluated, they need to return to the practice at which they are a patient of record or schedule a comprehensive exam afterwards.  Patient understands and consent was given by self via verbal consent.    Subjective history:   Pain Scale: 9/10  Region: UL  Provocation:Cold, Biting, Chewing, and Drinking  Quality:Sharp, Shooting, and Throbbing  Timing: constant and keeps patient up at night    Objective clinical findings:              Oral cancer screening: Within normal limits              Extraoral exam: no remarkable findings.  Intraoral exam: No decay and Fractured restorations/teeth              Radiographs: Periapical teeth #12-14              Pulp testing:  Findings: Abscess    Swelling at the mid face left side due to a dental abscess on # 12     Assessment/Plan: tooth #12 non restorable. Hopeless. Recommended Extraction. Patient agrees.   Rx: Amoxicillin 500 mg caps for 7 days no refills         Tylenol 500 mg caps # 30 caps no refills sent to pt's pharmacy of choice    Post op instructions given      Comprehensive care disposition: Patient expressed interest in becoming a patient of record with one of the [facility] dental clinics    Patient dismissed ambulatory and alert.     NV: Extraction # 12 roots                 "

## 2025-03-27 ENCOUNTER — PATIENT OUTREACH (OUTPATIENT)
Dept: FAMILY MEDICINE CLINIC | Facility: CLINIC | Age: 60
End: 2025-03-27

## 2025-03-27 NOTE — PROGRESS NOTES
OP SW had received a referral from MAREN Ramirez r/t transportation insecurity. OP SW had completed a chart review. Per chart, [facility]. OP SW noted patient has access to [employer] services through insurance, and the application for [employer] will determine eligibility. OP SW had closed case on 3/6 due to lack of communication from patient. Please reference previous notes.    OP SW had called the patient via phone. OP SW left a voicemail in Bengali. OP SW will attempt to call again at a late date and time. OP SW will continue to be available.

## 2025-03-28 ENCOUNTER — OFFICE VISIT (OUTPATIENT)
Dept: CARDIOLOGY CLINIC | Facility: CLINIC | Age: 60
End: 2025-03-28
Payer: COMMERCIAL

## 2025-03-28 VITALS
BODY MASS INDEX: 31.34 KG/M2 | OXYGEN SATURATION: 98 % | DIASTOLIC BLOOD PRESSURE: 68 MMHG | HEIGHT: 66 IN | HEART RATE: 95 BPM | SYSTOLIC BLOOD PRESSURE: 114 MMHG | WEIGHT: 195 LBS

## 2025-03-28 DIAGNOSIS — G47.33 MODERATE OBSTRUCTIVE SLEEP APNEA: ICD-10-CM

## 2025-03-28 DIAGNOSIS — I51.89 LEFT VENTRICULAR SYSTOLIC DYSFUNCTION (LVSD), NYHA CLASS 2: ICD-10-CM

## 2025-03-28 DIAGNOSIS — I25.10 CORONARY ARTERY DISEASE INVOLVING NATIVE CORONARY ARTERY OF NATIVE HEART WITHOUT ANGINA PECTORIS: Primary | ICD-10-CM

## 2025-03-28 DIAGNOSIS — E11.59 TYPE 2 DIABETES MELLITUS WITH OTHER CIRCULATORY COMPLICATION, WITHOUT LONG-TERM CURRENT USE OF INSULIN (HCC): ICD-10-CM

## 2025-03-28 PROCEDURE — 99214 OFFICE O/P EST MOD 30 MIN: CPT | Performed by: INTERNAL MEDICINE

## 2025-03-28 RX ORDER — AMLODIPINE BESYLATE 10 MG/1
10 TABLET ORAL
COMMUNITY
Start: 2025-02-24

## 2025-03-28 RX ORDER — FUROSEMIDE 20 MG/1
20 TABLET ORAL
Qty: 30 TABLET | Refills: 0 | Status: SHIPPED | OUTPATIENT
Start: 2025-03-28

## 2025-03-28 NOTE — ASSESSMENT & PLAN NOTE
Echocardiogram done day before yesterday shows persisting LV dysfunction with Marked hypokinesis to akinesis of the apical And surrounding walls.  There was no significant valvular regurgitation and no pulmonary hypertension was noted.  He is on good medical regimen.  Advising him to continue all medications and to avoid added salt in diet and monitoring weight and fluid intake.  Prescribing furosemide 20 mg up to 3 times a week  Only if he is experiencing lower extremity swelling.  Orders:    furosemide (LASIX) 20 mg tablet; Take 1 tablet (20 mg total) by mouth every other day as needed (Lower extremity edema)

## 2025-03-28 NOTE — PROGRESS NOTES
Name: Segun Alberts      : 1965      MRN: [id number]  Encounter Provider: Maninder Mcguire MD  Encounter Date: 3/28/2025   Encounter department: [facility] CARDIOLOGY [place]    DIAGNOSES:  1. Coronary artery disease, history of NSTEMI, status post PCI of proximal LAD 2025, history of STEMI 2025 weight 100% occlusion due to stent thrombosis, status post PCI with thrombectomy and drug-eluting stent placement.  2. Hypertensive heart disease without heart failure, severe left ventricular hypertrophy  3. Diabetes mellitus  4. Obesity  5. Dyslipidemia  6. Asthma  7. Obstructive sleep apnea, on CPAP therapy    ECHOCARDIOGRAM 3/26/2025:  Normal left ventricular size, low normal left ventricular systolic function, grade 1 diastolic dysfunction.  EF 50%.  Akinesis of the apex and hypokinesis of the surrounding walls.  Normal RV size and function.  Normal left and right atrial size.  Mildly thickened and calcified aortic valve without stenosis or regurgitation.  Back.  No MR.  No TR or PI.  No pulmonary hypertension  No pericardial effusion.    TTE 2025: Limited study.  Left ventricular ejection fraction 50%, moderate hypokinesis of anteroseptal wall from mid to apex and anterior wall mid to apex and apical wall.  Normal RV size and function, dilated LA, normal RV size, MAC, trivial pericardial effusion.    TTE (2025):  Severe concentric left ventricular hypertrophy, normal LV function EF 68%, grade 1 diastolic dysfunction, aortic valve sclerosis and mitral valve sclerosis, no significant stenosis or regurgitation.  No left or right atrial or RV enlargement, trace tricuspid valve regurgitation, no pulmonary hypertension.    CARDIAC CATH 2025: 100% occlusion of proximal LAD stent with thrombus. uccessful PCI with thrombectomy and placement of a 3.0 x 34 mm MAGALI post dilated to 3.5 mm.     CARDIAC CATH 2025:  Left main: Moderate size, angiographically normal.  LAD: Moderate size,  severe diffuse disease throughout vessel.  95% proximal LAD stenosis, 50% mid LAD stenosis, distal LAD with 2 lesions 55 and 60%.  LCx: Moderate diffuse disease, 55% mid LCx stenosis.  RCA: Moderate size moderate diffuse disease throughout vessel.  RPDA moderate-sized with diffuse disease.  RPL B artery small with moderate diffuse disease.  Assessment & Plan  Coronary artery disease involving native coronary artery of native heart without angina pectoris  Appears overall stable from cardiac perspective with reasonably well-controlled heart rate and blood pressure.  He is on good medical regimen.  He does have  several symptoms of right-sided and back chest pain and lower lower extremity pain.  Description of pain is atypical for angina and more suggestive of musculoskeletal pain.  He could have claudication symptoms in the leg as they are precipitated with walking.  Echocardiogram done recently is significant for low normal to borderline reduced left ventricular systolic function with extensive regional wall motion abnormalities involving the apical And surrounding region.  There was no evidence of left ventricular thrombus.    Current cardiac medications: Aspirin 81 mg daily + atorvastatin 80 mg daily + isosorbide mononitrate 30 mg daily + losartan 50 mg daily + metoprolol tartrate 50 mg twice daily + spironolactone 50 mg daily + ticagrelor 90 mg twice d    Recent blood work normal.  BNP slightly increased at 251.    Advising to continue current medications.  Will add Lasix 20 mg 3 times a week as needed if he develops lower extremity edema.  Advising to complete cardiac rehab.  He can begin light duty work as tolerated and gradually increase to full-time.  Will arrange follow-up in 4 months time with advanced practitioner.       Moderate obstructive sleep apnea  Pa has diagnosed sleep apnea but no longer has CPAP.  He is awaiting appointment with sleep medicine specialist to get it represcribed.  Advised the  importance of keeping this appointment.       Type 2 diabetes mellitus with other circulatory complication, without long-term current use of insulin ([facility])    Lab Results   Component Value Date    HGBA1C 6.7 (H) 01/14/2025   Advise close follow-up with primary care doctor and to avoid excess carbohydrates and sugars in the diet.  Advised regular exercise activity.       Left ventricular systolic dysfunction (LVSD), NYHA class 2  Echocardiogram done day before yesterday shows persisting LV dysfunction with Marked hypokinesis to akinesis of the apical And surrounding walls.  There was no significant valvular regurgitation and no pulmonary hypertension was noted.  He is on good medical regimen.  Advising him to continue all medications and to avoid added salt in diet and monitoring weight and fluid intake.  Prescribing furosemide 20 mg up to 3 times a week  Only if he is experiencing lower extremity swelling.  Orders:    furosemide (LASIX) 20 mg tablet; Take 1 tablet (20 mg total) by mouth every other day as needed (Lower extremity edema)        History of Present Illness   HPI  Segun Alberts is a 59 y.o. male who presents regarding follow-up of his coronary artery disease, severe LVH, hypertensive heart disease without heart failure, dyslipidemia, sleep apnea and other comorbidities.  He was last seen in cardiology office on 3/4/2025 and was referred to undergo echocardiogram, chest x-ray and blood work.    History obtained from: patient    He mentions that since last visit he has had no new diagnoses or hospitalizations or significant illnesses.  From a symptom perspective he reports that he continues to experience pain on the right side of the chest when he exerts himself.  Also reports having tightness and pain in the calf muscles when he walks.  Denies orthopnea or PND or pedal edema.  Reports being compliant with medications.  Has been going to cardiac rehab and as per documentation has been doing well.       Functional capacity status: Moderately decreased   (Excellent- >10 METs; Good: (7-10 METs); Moderate (4-7 METs); Poor (<= 4 METs)    Any chronic stressors: Financial stress   (feeling of poor health, financial problems, and social isolation etc).    Tobacco or alcohol dependence: Does not smoke.  Does not drink.    Current cardiac medications: Aspirin 81 mg daily + atorvastatin 80 mg daily + isosorbide mononitrate 30 mg daily + losartan 50 mg daily + metoprolol tartrate 50 mg twice daily + spironolactone 50 mg daily + ticagrelor 90 mg twice daily.     Last recent comprehensive blood work available:   Blood work 3/12/2025 sodium 138 potassium 4.2 chloride 103 bicarb 24 BUN 17 creatinine 1.0 GFR 79      Chest x-ray 3/10/2025: Unremarkable cardiomediastinal silhouette.  No pulmonary vascular congestion or pleural effusion.    ECG: No results found for this visit on 03/28/25.    CURRENT MEDICATIONS LIST     Current Outpatient Medications:     acetaminophen (TYLENOL) 500 mg tablet, Take 1 tablet (500 mg total) by mouth every 6 (six) hours as needed for mild pain, Disp: 30 tablet, Rfl: 0    albuterol (PROVENTIL HFA,VENTOLIN HFA) 90 mcg/act inhaler, Inhale 1 puff every 6 (six) hours as needed for wheezing, Disp: 8.5 g, Rfl: 5    amLODIPine (NORVASC) 10 mg tablet, Take 10 mg by mouth daily after dinner, Disp: , Rfl:     amoxicillin (AMOXIL) 500 mg capsule, Take 1 capsule (500 mg total) by mouth every 8 (eight) hours for 7 days, Disp: 21 capsule, Rfl: 0    aspirin 81 mg chewable tablet, Chew 1 tablet (81 mg total) daily, Disp: 30 tablet, Rfl: 11    atorvastatin (LIPITOR) 80 mg tablet, Take 1 tablet (80 mg total) by mouth daily with dinner, Disp: , Rfl:     Blood Pressure Monitoring ([place] [employer] BP Monitor/Arm) KELLY, Use daily as needed (dizziness), Disp: 1 each, Rfl: 0    fluticasone (FLONASE) 50 mcg/act nasal spray, 1 spray into each nostril daily, Disp: 15.8 mL, Rfl: 5    fluticasone-salmeterol (Advair HFA)  "230-21 MCG/ACT inhaler, Inhale 2 puffs 2 (two) times a day Rinse mouth after use., Disp: 24 g, Rfl: 3    isosorbide mononitrate (IMDUR) 30 mg 24 hr tablet, Take 1 tablet (30 mg total) by mouth daily, Disp: 30 tablet, Rfl: 11    losartan (COZAAR) 50 mg tablet, Take 1 tablet (50 mg total) by mouth daily after lunch, Disp: , Rfl:     metFORMIN (GLUCOPHAGE-XR) 500 mg 24 hr tablet, Take 1 tablet (500 mg total) by mouth daily with dinner, Disp: 90 tablet, Rfl: 0    methylPREDNISolone 4 MG tablet therapy pack, Use as directed on package, Disp: 21 each, Rfl: 0    metoprolol tartrate (LOPRESSOR) 25 mg tablet, Take 2 tablets (50 mg total) by mouth every 12 (twelve) hours, Disp: 120 tablet, Rfl: 11    spironolactone (ALDACTONE) 50 mg tablet, Take 1 tablet (50 mg total) by mouth daily, Disp: , Rfl:     ticagrelor (BRILINTA) 90 MG, Take 1 tablet (90 mg total) by mouth every 12 (twelve) hours, Disp: 60 tablet, Rfl: 11    traZODone (DESYREL) 50 mg tablet, Take 1 tablet (50 mg total) by mouth daily at bedtime as needed for sleep, Disp: 30 tablet, Rfl: 2       ALLERGIES   No Known Allergies    *-*-*-*-*-*-*-*-*-*-*-*-*-*-*-*-*-*-*-*-*-*-*-*-*-*-*-*-*-*-*-*-*-*-*-*-*-*-*-*-*-*-*-*-*-*-*-*-*-*-*-*-*-*-      Review of Systems   Constitutional:  Positive for fatigue.   HENT: Negative.     Respiratory:  Positive for chest tightness and shortness of breath. Negative for cough and wheezing.    Cardiovascular:  Positive for chest pain.   Gastrointestinal: Negative.    Endocrine: Negative.    Genitourinary: Negative.    Musculoskeletal:  Positive for arthralgias, back pain and myalgias.   Skin: Negative.    Neurological: Negative.    Hematological: Negative.    Psychiatric/Behavioral: Negative.            Objective   /68   Pulse 95   Ht 5' 6\" (1.676 m)   Wt 88.5 kg (195 lb)   SpO2 98%   BMI 31.47 kg/m²      Physical Exam  Constitutional:       General: He is not in acute distress.     Appearance: He is obese. He is not " ill-appearing.   HENT:      Head: Atraumatic.      Mouth/Throat:      Mouth: Mucous membranes are moist.   Neck:      Vascular: No carotid bruit.   Cardiovascular:      Rate and Rhythm: Normal rate and regular rhythm.      Heart sounds: Normal heart sounds. No murmur heard.  Pulmonary:      Effort: Pulmonary effort is normal.      Breath sounds: Examination of the right-upper field reveals decreased breath sounds. Examination of the left-upper field reveals decreased breath sounds. Examination of the right-middle field reveals decreased breath sounds. Examination of the left-middle field reveals decreased breath sounds. Examination of the right-lower field reveals decreased breath sounds. Examination of the left-lower field reveals decreased breath sounds. Decreased breath sounds present. No wheezing, rhonchi or rales.   Abdominal:      Palpations: Abdomen is soft.   Musculoskeletal:      Cervical back: Neck supple.   Skin:     General: Skin is warm and dry.      Capillary Refill: Capillary refill takes more than 3 seconds.   Neurological:      Mental Status: He is oriented to person, place, and time. Mental status is at baseline.   Psychiatric:         Mood and Affect: Mood normal.         Behavior: Behavior normal.

## 2025-03-28 NOTE — ASSESSMENT & PLAN NOTE
Lab Results   Component Value Date    HGBA1C 6.7 (H) 01/14/2025   Advise close follow-up with primary care doctor and to avoid excess carbohydrates and sugars in the diet.  Advised regular exercise activity.

## 2025-03-28 NOTE — ASSESSMENT & PLAN NOTE
Pa has diagnosed sleep apnea but no longer has CPAP.  He is awaiting appointment with sleep medicine specialist to get it represcribed.  Advised the importance of keeping this appointment.

## 2025-03-28 NOTE — PATIENT INSTRUCTIONS
Assessment & Plan  Coronary artery disease involving native coronary artery of native heart without angina pectoris  Appears overall stable from cardiac perspective with reasonably well-controlled heart rate and blood pressure.  He is on good medical regimen.  He does have  several symptoms of right-sided and back chest pain and lower lower extremity pain.  Description of pain is atypical for angina and more suggestive of musculoskeletal pain.  He could have claudication symptoms in the leg as they are precipitated with walking.  Echocardiogram done recently is significant for low normal to borderline reduced left ventricular systolic function with extensive regional wall motion abnormalities involving the apical And surrounding region.  There was no evidence of left ventricular thrombus.    Current cardiac medications: Aspirin 81 mg daily + atorvastatin 80 mg daily + isosorbide mononitrate 30 mg daily + losartan 50 mg daily + metoprolol tartrate 50 mg twice daily + spironolactone 50 mg daily + ticagrelor 90 mg twice d    Recent blood work normal.  BNP slightly increased at 251.    Advising to continue current medications.  Will add Lasix 20 mg 3 times a week as needed if he develops lower extremity edema.  Advising to complete cardiac rehab.  He can begin light duty work as tolerated and gradually increase to full-time.  Will arrange follow-up in 4 months time with advanced practitioner.       Moderate obstructive sleep apnea  Pa has diagnosed sleep apnea but no longer has CPAP.  He is awaiting appointment with sleep medicine specialist to get it represcribed.  Advised the importance of keeping this appointment.       Type 2 diabetes mellitus with other circulatory complication, without long-term current use of insulin ([facility])    Lab Results   Component Value Date    HGBA1C 6.7 (H) 01/14/2025   Advise close follow-up with primary care doctor and to avoid excess carbohydrates and sugars in the diet.  Advised regular  exercise activity.       Left ventricular systolic dysfunction (LVSD), NYHA class 2  Echocardiogram done day before yesterday shows persisting LV dysfunction with Marked hypokinesis to akinesis of the apical And surrounding walls.  There was no significant valvular regurgitation and no pulmonary hypertension was noted.  He is on good medical regimen.  Advising him to continue all medications and to avoid added salt in diet and monitoring weight and fluid intake.  Prescribing furosemide 20 mg up to 3 times a week  Only if he is experiencing lower extremity swelling.  Orders:    furosemide (LASIX) 20 mg tablet; Take 1 tablet (20 mg total) by mouth every other day as needed (Lower extremity edema)

## 2025-03-31 ENCOUNTER — PATIENT OUTREACH (OUTPATIENT)
Dept: FAMILY MEDICINE CLINIC | Facility: CLINIC | Age: 60
End: 2025-03-31

## 2025-03-31 ENCOUNTER — CLINICAL SUPPORT (OUTPATIENT)
Dept: CARDIAC REHAB | Facility: CLINIC | Age: 60
End: 2025-03-31
Payer: COMMERCIAL

## 2025-03-31 DIAGNOSIS — I21.3 ST ELEVATION MYOCARDIAL INFARCTION (STEMI), UNSPECIFIED ARTERY (HCC): Primary | ICD-10-CM

## 2025-03-31 PROCEDURE — 93798 PHYS/QHP OP CAR RHAB W/ECG: CPT

## 2025-03-31 NOTE — PROGRESS NOTES
OP TARIQ had called the patient via phone. OP TARIQ left a voicemail in Yoruba. OP TARIQ notes this is the second phone call attempt. JUSTIN POTTER sent unable to reach letter via mail. OP TARIQ closed referral. Please reconsult as needed.  
poor minus
poor minus

## 2025-03-31 NOTE — LETTER
03/31/25    Estimado/a Segun Franko Alberts,    Intenté comunicarme por teléfono, con usted por teléfono y desafortunadamente no pude comunicarme con usted. Soy la trabajadora social de [facility]. Estoy dando seguimiento a liz referencia de mast médico de cabecera. Por favor, llámame al [phone].    Atentamente.         NADIA Echols

## 2025-04-01 ENCOUNTER — PATIENT OUTREACH (OUTPATIENT)
Dept: FAMILY MEDICINE CLINIC | Facility: CLINIC | Age: 60
End: 2025-04-01

## 2025-04-01 ENCOUNTER — TELEPHONE (OUTPATIENT)
Dept: FAMILY MEDICINE CLINIC | Facility: CLINIC | Age: 60
End: 2025-04-01

## 2025-04-01 NOTE — PROGRESS NOTES
JUSTIN POTTER had received an in basket from Sofya Cardenas, . Per in basket, patient requesting call back from JUSTIN POTTER. Per in basket, patient has a new contact number. JUSTIN POTTER responded that she would attempt to call again.     JUSTIN POTTER had called the patient via phone. JUSTIN POTTER left a voicemail in Chinese. JUSTIN POTTER already had sent an unable to reach letter yesterday. JUSTIN POTTER will continue to keep referral closed. Please reconsult as needed.

## 2025-04-01 NOTE — TELEPHONE ENCOUNTER
I called the patient regarding a copy of an Authorization to disclose information to [employer], that he left yesterday. He stated he wanted his pcp to sign, however, pcp stated she doesn't sign this form, to ask . Per  Kristie, this isn't something they sign. Patient has been informed.    Patient is asking if  can contact him. I did update his phone number yesterday, the one on file was not working when he was being contacted before.

## 2025-04-04 ENCOUNTER — CLINICAL SUPPORT (OUTPATIENT)
Dept: CARDIAC REHAB | Facility: CLINIC | Age: 60
End: 2025-04-04
Payer: COMMERCIAL

## 2025-04-04 DIAGNOSIS — I21.3 ST ELEVATION MYOCARDIAL INFARCTION (STEMI), UNSPECIFIED ARTERY (HCC): Primary | ICD-10-CM

## 2025-04-04 PROCEDURE — 93798 PHYS/QHP OP CAR RHAB W/ECG: CPT

## 2025-04-07 ENCOUNTER — APPOINTMENT (OUTPATIENT)
Dept: CARDIAC REHAB | Facility: CLINIC | Age: 60
End: 2025-04-07
Payer: COMMERCIAL

## 2025-04-09 ENCOUNTER — CLINICAL SUPPORT (OUTPATIENT)
Dept: CARDIAC REHAB | Facility: CLINIC | Age: 60
End: 2025-04-09
Payer: COMMERCIAL

## 2025-04-09 DIAGNOSIS — I21.3 ST ELEVATION MYOCARDIAL INFARCTION (STEMI), UNSPECIFIED ARTERY (HCC): Primary | ICD-10-CM

## 2025-04-09 PROCEDURE — 93798 PHYS/QHP OP CAR RHAB W/ECG: CPT

## 2025-04-11 ENCOUNTER — CLINICAL SUPPORT (OUTPATIENT)
Dept: CARDIAC REHAB | Facility: CLINIC | Age: 60
End: 2025-04-11
Attending: INTERNAL MEDICINE
Payer: COMMERCIAL

## 2025-04-11 DIAGNOSIS — I21.3 ST ELEVATION MYOCARDIAL INFARCTION (STEMI), UNSPECIFIED ARTERY (HCC): Primary | ICD-10-CM

## 2025-04-11 DIAGNOSIS — G47.00 INSOMNIA, UNSPECIFIED TYPE: ICD-10-CM

## 2025-04-11 PROCEDURE — 93798 PHYS/QHP OP CAR RHAB W/ECG: CPT

## 2025-04-11 RX ORDER — TRAZODONE HYDROCHLORIDE 50 MG/1
50 TABLET ORAL
Qty: 30 TABLET | Refills: 2 | Status: SHIPPED | OUTPATIENT
Start: 2025-04-11

## 2025-04-15 ENCOUNTER — TELEPHONE (OUTPATIENT)
Dept: FAMILY MEDICINE CLINIC | Facility: CLINIC | Age: 60
End: 2025-04-15

## 2025-04-15 ENCOUNTER — OFFICE VISIT (OUTPATIENT)
Dept: DENTISTRY | Facility: CLINIC | Age: 60
End: 2025-04-15

## 2025-04-15 VITALS — TEMPERATURE: 98.8 F | DIASTOLIC BLOOD PRESSURE: 75 MMHG | HEART RATE: 103 BPM | SYSTOLIC BLOOD PRESSURE: 141 MMHG

## 2025-04-15 DIAGNOSIS — K08.89 PAIN, DENTAL: Primary | ICD-10-CM

## 2025-04-15 DIAGNOSIS — K08.9 EXTRACTION OF TOOTH NEEDED: ICD-10-CM

## 2025-04-15 PROCEDURE — D0140 LIMITED ORAL EVALUATION - PROBLEM FOCUSED: HCPCS | Performed by: DENTIST

## 2025-04-15 PROCEDURE — D0220 INTRAORAL - PERIAPICAL FIRST RADIOGRAPHIC IMAGE: HCPCS | Performed by: DENTIST

## 2025-04-15 RX ORDER — IBUPROFEN 600 MG/1
600 TABLET, FILM COATED ORAL EVERY 6 HOURS PRN
Qty: 30 TABLET | Refills: 0 | Status: SHIPPED | OUTPATIENT
Start: 2025-04-15 | End: 2025-04-25

## 2025-04-15 NOTE — TELEPHONE ENCOUNTER
Patient is requesting to speak to . Patient stated he can receive a call on his primary phone or spouses.

## 2025-04-15 NOTE — PROGRESS NOTES
"Procedure Details  18  - INTRAORAL - PERIAPICAL FIRST RADIOGRAPHIC IMAGE   - ASSESSMENT OF A PATIENT    Utilized /translation ID: No  59 y.o. male patient presents to [facility] for extraction of #12.       PMH: Reviewed health history, patient had a heart attack in January where he had splints placed.- Patient is ASA 3 - Patient with moderate systemic disease with functional limitations.     Chief complaint: \"My pain started coming back a day or two after I finished my antibiotics and I started having pain down here\", pointing to his lower left side  HPI: Last visit was on 3/26/25 for limited exam.    Objective clinical findings:              Oral cancer screening: Within normal limits              Extraoral exam: no remarkable findings.  Intraoral exam: Gross/rampant decay              Radiographs: Periaplical tooth #18  - non-restorable                Findings: Significantly sized caries  Assessment/Plan: tooth #12,18 non restorable. Poor. Recommended Extraction. Patient agrees. Due to patient's recent heart attack and splint placement, I will be unable to extract his teeth today. Referral to OS to manage the higher care this patient will require.     When discussing options for #18, patient said that he would not have financing to cover any other options than extraction at this point. He is concerned that he will be homeless soon. I offered contact with the social workers, but he declined the extra referral, since he had recently had the referral from elsewhere and said he will follow up.  Referral(s): OMFS referral provided  Rx: Amoxicillin with Clavulanate (875-125 mg). Take 1 tablet every 12 hours for 7 days. Disp 21 tablets. Ibuprofen 600 mg. Take 1 tablet every 4-6 hours as needed for pain.  Comprehensive care disposition: Patient expressed interest in becoming a patient of record with one of the [facility] dental clinics    Patient dismissed ambulatory and alert.     NV: Follow up after " extractions completed/Comp oral

## 2025-04-16 ENCOUNTER — PATIENT OUTREACH (OUTPATIENT)
Dept: FAMILY MEDICINE CLINIC | Facility: CLINIC | Age: 60
End: 2025-04-16

## 2025-04-16 NOTE — PROGRESS NOTES
JUSTIN POTTER had contacted the patient. JUSTIN POTTER introduced herself and reviewed the reason for the consultation. JUSTIN POTTER also explained her role in the process. Patient understood the information provided.    Patient disclosed that he is renting a room. Patient stated he use his spouse, Luli's address to obtain main. Patient stated Luli resides in section 8 housing and he cannot live with her. Patient stated sister is in [place]. Patient stated his children are in [place], [place]. Patient stated no one can help aside from Luli.    Patient stated he called  office and case will take 200 days to be reviewed.Patient stated he has an appointment on 4/26 about reopening unemployment benefits. 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    Patient stated he cannot apply for SNAP because it will mess up Luli's benefits. Patient stated he cannot use place he is staying at as they do not want him to use it. Patient stated address on file [address], [place] belongs to Luli. Patient did not want to disclose his actual address.    Patient stated he called housing to be added to waitlist and was told there is nothing he can apply for. Patient stated he thankfully has [employer] for insurance to obtain his medications. Patient stated he did not have a phone for 2 months. Patient stated he just got a free [employer] phone so this is his new number.     JUSTIN POTTER informed the patient that she can placed another referral to [facility] to help apply for [employer]. Patient agreeable. Patient stated he would  phone this time. JUSTIN POTTER placed a referral for assistance with [employer].     Per chart, there are no h/o MH. Per chart, there are no h/o ABHI. Patient denied any other needs at this time.    JUSTIN POTTER provided her contact information. JUSTIN POTTER advised the patient to reach out as needed. Patient agreed and consented to continued JUSTIN POTTER  outreach. OP SW enrolled the patient in the program. OP SW routed note to CMOCs. OP SW will continue f/u.

## 2025-04-18 ENCOUNTER — PATIENT OUTREACH (OUTPATIENT)
Dept: FAMILY MEDICINE CLINIC | Facility: CLINIC | Age: 60
End: 2025-04-18

## 2025-04-18 ENCOUNTER — APPOINTMENT (OUTPATIENT)
Dept: CARDIAC REHAB | Facility: CLINIC | Age: 60
End: 2025-04-18
Payer: COMMERCIAL

## 2025-04-18 ENCOUNTER — TELEPHONE (OUTPATIENT)
Age: 60
End: 2025-04-18

## 2025-04-18 ENCOUNTER — TELEPHONE (OUTPATIENT)
Dept: PULMONOLOGY | Facility: CLINIC | Age: 60
End: 2025-04-18

## 2025-04-18 NOTE — TELEPHONE ENCOUNTER
Pt wife called in regard to scheduling with [facility].Pt's wife was provided the number for [facility].

## 2025-04-18 NOTE — PROGRESS NOTES
Outgoing Call:  4/18/2025    Chart reviewed. Referral received from Kristie POTTER informing Pt is interested in applying for [facility] services. CMOC called Pt to discuss referral. Detailed VM left. Note routed to Donovan.     Next outreach is scheduled for 4/22/2025.

## 2025-04-21 ENCOUNTER — OFFICE VISIT (OUTPATIENT)
Dept: FAMILY MEDICINE CLINIC | Facility: CLINIC | Age: 60
End: 2025-04-21

## 2025-04-21 ENCOUNTER — TELEPHONE (OUTPATIENT)
Dept: FAMILY MEDICINE CLINIC | Facility: CLINIC | Age: 60
End: 2025-04-21

## 2025-04-21 ENCOUNTER — APPOINTMENT (OUTPATIENT)
Dept: CARDIAC REHAB | Facility: CLINIC | Age: 60
End: 2025-04-21
Payer: COMMERCIAL

## 2025-04-21 VITALS
RESPIRATION RATE: 18 BRPM | DIASTOLIC BLOOD PRESSURE: 80 MMHG | WEIGHT: 203 LBS | HEART RATE: 94 BPM | BODY MASS INDEX: 32.62 KG/M2 | HEIGHT: 66 IN | SYSTOLIC BLOOD PRESSURE: 130 MMHG | TEMPERATURE: 98.2 F | OXYGEN SATURATION: 98 %

## 2025-04-21 DIAGNOSIS — I51.89 LEFT VENTRICULAR SYSTOLIC DYSFUNCTION (LVSD), NYHA CLASS 2: ICD-10-CM

## 2025-04-21 DIAGNOSIS — I10 PRIMARY HYPERTENSION: ICD-10-CM

## 2025-04-21 DIAGNOSIS — E66.811 CLASS 1 OBESITY DUE TO EXCESS CALORIES WITH SERIOUS COMORBIDITY AND BODY MASS INDEX (BMI) OF 32.0 TO 32.9 IN ADULT: ICD-10-CM

## 2025-04-21 DIAGNOSIS — Z01.21 ENCOUNTER FOR DENTAL EXAMINATION AND CLEANING WITH ABNORMAL FINDINGS: ICD-10-CM

## 2025-04-21 DIAGNOSIS — G47.00 INSOMNIA, UNSPECIFIED TYPE: ICD-10-CM

## 2025-04-21 DIAGNOSIS — E11.9 TYPE 2 DIABETES MELLITUS WITHOUT COMPLICATION, WITHOUT LONG-TERM CURRENT USE OF INSULIN (HCC): Primary | ICD-10-CM

## 2025-04-21 DIAGNOSIS — E66.09 CLASS 1 OBESITY DUE TO EXCESS CALORIES WITH SERIOUS COMORBIDITY AND BODY MASS INDEX (BMI) OF 32.0 TO 32.9 IN ADULT: ICD-10-CM

## 2025-04-21 DIAGNOSIS — Z87.81 HISTORY OF FRACTURE OF NASAL BONE: ICD-10-CM

## 2025-04-21 PROBLEM — R21 RASH: Status: RESOLVED | Noted: 2021-05-13 | Resolved: 2025-04-21

## 2025-04-21 PROBLEM — H93.13 TINNITUS OF BOTH EARS: Status: RESOLVED | Noted: 2021-01-22 | Resolved: 2025-04-21

## 2025-04-21 PROBLEM — M79.641 PAIN IN RIGHT HAND: Status: RESOLVED | Noted: 2023-01-02 | Resolved: 2025-04-21

## 2025-04-21 PROBLEM — Z78.9 TRANSITION OF CARE: Status: RESOLVED | Noted: 2025-01-30 | Resolved: 2025-04-21

## 2025-04-21 LAB — SL AMB POCT HEMOGLOBIN AIC: 6.1 (ref ?–6.5)

## 2025-04-21 PROCEDURE — 83036 HEMOGLOBIN GLYCOSYLATED A1C: CPT

## 2025-04-21 PROCEDURE — 99214 OFFICE O/P EST MOD 30 MIN: CPT

## 2025-04-21 PROCEDURE — 3079F DIAST BP 80-89 MM HG: CPT

## 2025-04-21 PROCEDURE — 3075F SYST BP GE 130 - 139MM HG: CPT

## 2025-04-21 RX ORDER — TRAZODONE HYDROCHLORIDE 50 MG/1
50 TABLET ORAL
Qty: 30 TABLET | Refills: 2 | Status: SHIPPED | OUTPATIENT
Start: 2025-04-21

## 2025-04-21 RX ORDER — ACETAMINOPHEN 500 MG
500 TABLET ORAL EVERY 6 HOURS PRN
Qty: 30 TABLET | Refills: 0 | Status: SHIPPED | OUTPATIENT
Start: 2025-04-21 | End: 2025-04-25

## 2025-04-21 RX ORDER — METFORMIN HYDROCHLORIDE 500 MG/1
500 TABLET, EXTENDED RELEASE ORAL
Qty: 90 TABLET | Refills: 0 | Status: SHIPPED | OUTPATIENT
Start: 2025-04-21 | End: 2025-07-20

## 2025-04-21 NOTE — ASSESSMENT & PLAN NOTE
A1C improved on metformin 500 mg with dinner.   Will continue for now. If able to get semaglutide approved, then can discontinue metformin.   Continue heart healthy, carb control diet  Follow up in 3 months    Lab Results   Component Value Date    HGBA1C 6.1 04/21/2025    HGBA1C 6.7 (H) 01/14/2025    HGBA1C 6.1 (H) 03/20/2023       Orders:    POCT hemoglobin A1c    Semaglutide-Weight Management (WEGOVY) 0.25 MG/0.5ML; Inject 0.5 mL (0.25 mg total) under the skin once a week    metFORMIN (GLUCOPHAGE-XR) 500 mg 24 hr tablet; Take 1 tablet (500 mg total) by mouth daily with dinner

## 2025-04-21 NOTE — ASSESSMENT & PLAN NOTE
Blood pressure within goal at today's visit. Continue current regimen:   - Imdur 30 mg daily   - Losartan 50 mg daily   - metoprolol 25 mg BID   - spironolactone 50 mg daily   - furosemide 20 mg every 2 days PRN    BP Readings from Last 3 Encounters:   04/21/25 130/80   04/15/25 141/75   03/28/25 114/68

## 2025-04-21 NOTE — PROGRESS NOTES
"Name: Segun Alberts      : 1965      MRN: [id number]  Encounter Provider: MAREN Ramirez  Encounter Date: 2025   Encounter department: [facility]  :  Assessment & Plan  Type 2 diabetes mellitus without complication, without long-term current use of insulin (HCC)  A1C improved on metformin 500 mg with dinner.   Will continue for now. If able to get semaglutide approved, then can discontinue metformin.   Continue heart healthy, carb control diet  Follow up in 3 months    Lab Results   Component Value Date    HGBA1C 6.1 2025    HGBA1C 6.7 (H) 2025    HGBA1C 6.1 (H) 2023       Orders:    POCT hemoglobin A1c    Semaglutide-Weight Management (WEGOVY) 0.25 MG/0.5ML; Inject 0.5 mL (0.25 mg total) under the skin once a week    metFORMIN (GLUCOPHAGE-XR) 500 mg 24 hr tablet; Take 1 tablet (500 mg total) by mouth daily with dinner    Primary hypertension  Blood pressure within goal at today's visit. 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 He states he was evaluated \"years ago\" and recommended to have surgical repair due to deviated septum. He declined at that time. However, he states he cannot breath out of his right nostril, and would like to be reevaluated. Will refer to ENT.     Orders:    Ambulatory Referral to Otolaryngology; Future    Class 1 obesity due to excess calories with serious comorbidity and body mass index (BMI) of 32.0 to 32.9 in adult  Prior Authorization Clinical Questions for Weight Management Pharmacotherapy    2. Does the patient have a diagnosis of obesity, confirmed by a BMI greater than " or equal to 30 kg/m^2?: Yes  3. Does the patient have a BMI of greater than or equal to 27 kg/m^2 with at least one weight-related comorbidity/risk factor/complication (e.g. diabetes, dyslipidemia, coronary artery disease)?: Yes  4. Weight-related co-morbidities/risk factors: type 2 diabetes, dyslipidemia, hypertension, cardiovascular disease, obstructive sleep apnea (AMOL), asthma/reactive airway disease  9. Does the patient have a history of type 2 diabetes?: Yes     Baseline weight (in pounds): 203 lbs         Orders:    Semaglutide-Weight Management (WEGOVY) 0.25 MG/0.5ML; Inject 0.5 mL (0.25 mg total) under the skin once a week    Insomnia, unspecified type  Patient reports trazodone is effective for insomnia. Will refill.   Suspect his insomnia is exacerbated by his untreated sleep apnea. He has an appointment with pulmonology tomorrow to address reordering of his CPAP machine.     Orders:    traZODone (DESYREL) 50 mg tablet; Take 1 tablet (50 mg total) by mouth daily at bedtime as needed for sleep           History of Present Illness       Segun Alberts is a 59 year old male with a history of HTN, HLD, asthma, T2DM, STEMI. He presents to the office today for diabetes follow up. At today's visit, he reports he would like to be prescribed semaglutide for weight loss. He also reports he is compliant with his current medication regimen and dietary restrictions. He denies any adverse drug side effects. He states he is frustrated by his activity intolerance and dyspnea on exertion which has been present since his 2 heart attacks earlier this year. He denies orthopnea, lower extremity edema, or worsening SOB. He states his SOB/CHUN is unchanged since his MI, but he is hopeful it will improve with time.       Review of Systems   Constitutional:  Negative for chills and fever.   HENT:  Negative for ear pain and sore throat.    Eyes:  Negative for pain and visual disturbance.   Respiratory:  Positive for  "shortness of breath. Negative for cough and wheezing.    Cardiovascular:  Negative for chest pain, palpitations and leg swelling.   Gastrointestinal:  Negative for abdominal pain and vomiting.   Genitourinary:  Negative for dysuria and hematuria.   Musculoskeletal:  Negative for arthralgias and back pain.   Skin:  Negative for color change and rash.   Neurological:  Negative for seizures and syncope.   All other systems reviewed and are negative.      Objective   /80 (BP Location: Left arm, Patient Position: Sitting, Cuff Size: Large)   Pulse 94   Temp 98.2 °F (36.8 °C) (Temporal)   Resp 18   Ht 5' 6\" (1.676 m)   Wt 92.1 kg (203 lb)   SpO2 98%   BMI 32.77 kg/m²      Physical Exam  Vitals and nursing note reviewed.   Constitutional:       General: He is not in acute distress.     Appearance: He is well-developed.   HENT:      Head: Normocephalic and atraumatic.   Eyes:      Conjunctiva/sclera: Conjunctivae normal.   Cardiovascular:      Rate and Rhythm: Normal rate and regular rhythm.      Heart sounds: No murmur heard.  Pulmonary:      Effort: Pulmonary effort is normal. No respiratory distress.      Breath sounds: Normal breath sounds. No wheezing, rhonchi or rales.   Abdominal:      Palpations: Abdomen is soft.      Tenderness: There is no abdominal tenderness.   Musculoskeletal:         General: No swelling.      Cervical back: Neck supple.      Right lower leg: No edema.      Left lower leg: No edema.   Skin:     General: Skin is warm and dry.      Capillary Refill: Capillary refill takes less than 2 seconds.   Neurological:      Mental Status: He is alert.   Psychiatric:         Mood and Affect: Mood normal.         "

## 2025-04-22 ENCOUNTER — TRANSCRIBE ORDERS (OUTPATIENT)
Dept: SLEEP CENTER | Facility: CLINIC | Age: 60
End: 2025-04-22

## 2025-04-22 ENCOUNTER — CONSULT (OUTPATIENT)
Dept: PULMONOLOGY | Facility: CLINIC | Age: 60
End: 2025-04-22
Payer: COMMERCIAL

## 2025-04-22 ENCOUNTER — PATIENT OUTREACH (OUTPATIENT)
Dept: FAMILY MEDICINE CLINIC | Facility: CLINIC | Age: 60
End: 2025-04-22

## 2025-04-22 VITALS
WEIGHT: 205 LBS | TEMPERATURE: 97.9 F | HEIGHT: 66 IN | DIASTOLIC BLOOD PRESSURE: 88 MMHG | SYSTOLIC BLOOD PRESSURE: 142 MMHG | OXYGEN SATURATION: 97 % | BODY MASS INDEX: 32.95 KG/M2 | HEART RATE: 101 BPM

## 2025-04-22 DIAGNOSIS — J45.40 MODERATE PERSISTENT ASTHMA, UNCOMPLICATED: Primary | ICD-10-CM

## 2025-04-22 DIAGNOSIS — G47.33 MODERATE OBSTRUCTIVE SLEEP APNEA: ICD-10-CM

## 2025-04-22 DIAGNOSIS — I51.89 LEFT VENTRICULAR SYSTOLIC DYSFUNCTION (LVSD), NYHA CLASS 2: ICD-10-CM

## 2025-04-22 DIAGNOSIS — I25.10 CORONARY ARTERY DISEASE INVOLVING NATIVE CORONARY ARTERY OF NATIVE HEART WITHOUT ANGINA PECTORIS: ICD-10-CM

## 2025-04-22 DIAGNOSIS — G47.33 MODERATE OBSTRUCTIVE SLEEP APNEA: Primary | ICD-10-CM

## 2025-04-22 PROBLEM — Z99.89 CPAP (CONTINUOUS POSITIVE AIRWAY PRESSURE) DEPENDENCE: Status: RESOLVED | Noted: 2022-05-09 | Resolved: 2025-04-22

## 2025-04-22 PROBLEM — J45.41 MODERATE PERSISTENT ASTHMA WITH EXACERBATION: Status: ACTIVE | Noted: 2025-04-22

## 2025-04-22 PROBLEM — R06.83 LOUD SNORING: Status: RESOLVED | Noted: 2021-03-30 | Resolved: 2025-04-22

## 2025-04-22 PROCEDURE — 95012 NITRIC OXIDE EXP GAS DETER: CPT | Performed by: INTERNAL MEDICINE

## 2025-04-22 PROCEDURE — 99214 OFFICE O/P EST MOD 30 MIN: CPT | Performed by: INTERNAL MEDICINE

## 2025-04-22 PROCEDURE — 94010 BREATHING CAPACITY TEST: CPT | Performed by: INTERNAL MEDICINE

## 2025-04-22 NOTE — PROGRESS NOTES
Outgoing Call:  4/22/2025    CMOC called Pt to discuss referral received from Kristie POTTER informing Pt would like to apply for [employer] services. Pt's wife, Luli answered the call. CMOC introduced self/role and reason for call. Luli agreed to services on behalf of Pt. She informed Pt is currently at his pulmonary appointment and will have him call CMOC later today. She informed she is in the process of applying for reduced bus fare on his behalf but states he could benefit from having [employer] services for further appointments. CMOC explained process of applying. She expressed understanding and informed Pt will call to provide needed information to complete [employer] application.     Luli also asked where Pt can receive rental assistance from at this time. CMOC referred to [employer] and she informed Pt was denied since he does not have income and an eviction letter. CMOC informed she can call [employer]. She agreed to call. Note routed to Kristie POTTER.     Next outreach is scheduled for 4/29/2025.

## 2025-04-22 NOTE — PROGRESS NOTES
Consultation - Pulmonary Medicine   Name: Segun Alberts      : 1965      MRN: [id number]  Encounter Provider: Jet Rodriguez MD  Encounter Date: 2025   Encounter department: [facility] [place]    Requesting Provider:   Maninder Mcguire Md  [address] [place] [address]     Reason for Consult: Asthma, shortness of breath:  Assessment & Plan  Moderate persistent asthma, uncomplicated  Currently controlled on Advair 230/21  Limited use of albuterol and no significant wheezing  Spirometry is most consistent with restriction which may be a function of weight  Airway inflammation score is low suggesting well-controlled asthma  I believe his shortness of breath may be related to combination of recent cardiovascular events, Brilinta, and metoprolol.  Would recommend he continue with cardiac rehab and focus on diet and weight loss as well  Orders:    Ambulatory Referral to Pulmonology    POCT spirometry    POCT FeNO    Moderate obstructive sleep apnea  Previously diagnosed moderate sleep apnea.  This was diagnosed in .  Currently has not been using CPAP for years  Does have loud snoring, significant cardiovascular risk factors, oropharyngeal crowding, poor sleep quality and daytime somnolence  He would significantly benefit from returning to CPAP use  Will need new diagnostic study to qualify and he should have in-lab testing due to his cardiovascular risk factors.  Polysomnogram has been ordered  He is scheduled to see sleep medicine in .  His study should be performed in a more expedited fashion however and I have reached out to the sleep medicine physician scheduled to see him in consultation  Neck circumference 16 inches  Orders:    Ambulatory Referral to Sleep Medicine; Future    Left ventricular systolic dysfunction (LVSD), NYHA class 2  He has diastolic dysfunction with low normal ejection fraction  Recent MI and does have wall motion  abnormalities on echo  Suspect there is a element of cardiovascular limitation, as well as medication effect from Brilinta and metoprolol  Orders:    Ambulatory Referral to Pulmonology    Coronary artery disease involving native coronary artery of native heart without angina pectoris  Follows with Dr. Mcguire  Possible Plavix failure which is why he needs Brilinta  Orders:    Ambulatory Referral to Pulmonology      No follow-ups on file.  History of Present Illness   Segun Alberts is a 59 y.o. male who presents for pulmonary evaluation at the request of his cardiologist, Dr. Mcguire.  He has had recent MI with possible Plavix failure and in-stent stenosis requiring Brilinta.  He is also on metoprolol and echocardiography shows mild left ventricular dysfunction.  He has been having significant shortness of breath.  He has longstanding asthma.  He is currently maintained on Advair 230/21.  He has a rescue albuterol inhaler but was instructed not to take regularly because of cardiovascular effects.  He has not been wheezing but has had shortness of breath with fairly limited exertion.  He is currently enrolled in cardiac rehab.    He is not experiencing any chest pain.  He denies any GERD.  No postnasal drip.  He has chronic sinus congestion due to deviated septum.  He does have some allergies.  No abdominal pain or GERD.  He is overweight.    He has previously been diagnosed with sleep apnea and was treated with CPAP for a period of time.  This was diagnosed in 2021 by home sleep study and was listed as moderate.  He has been requested for expedited treatment per his cardiologist due to recent cardiovascular events and risk factors    Review of systems:  Aside from what is mentioned in the HPI, ROS is otherwise negative    Medical History Reviewed by provider this encounter:     .    Historical Information   Past Medical History:   Diagnosis Date    Asthma     Hyperlipidemia     Hypertension     Sinus  "congestion      Past Surgical History:   Procedure Laterality Date    CARDIAC CATHETERIZATION Left 1/16/2025    Procedure: Cardiac Left Heart Cath;  Surgeon: Tammy Woodard DO;  Location: [facility];  Service: Cardiology    CARDIAC CATHETERIZATION N/A 1/16/2025    Procedure: Cardiac Coronary Angiogram;  Surgeon: Tammy Woodard DO;  Location: [facility];  Service: Cardiology    CARDIAC CATHETERIZATION  1/16/2025    Procedure: Left Heart Catherization;  Surgeon: Tammy Woodard DO;  Location: [facility];  Service: Cardiology    CARDIAC CATHETERIZATION N/A 1/16/2025    Procedure: Cardiac PCI;  Surgeon: Tammy Woodard DO;  Location: [facility];  Service: Cardiology    CARDIAC CATHETERIZATION N/A 1/24/2025    Procedure: Cardiac PCI;  Surgeon: Tobi Hudson MD;  Location: [facility];  Service: Cardiology    CARDIAC CATHETERIZATION N/A 1/24/2025    Procedure: Cardiac Coronary Angiogram;  Surgeon: Tobi Hudson MD;  Location: [facility];  Service: Cardiology    CARPAL TUNNEL RELEASE      right hand     Social History   Social History     Tobacco Use   Smoking Status Never    Passive exposure: Never   Smokeless Tobacco Never       Occupational/Environmental history:  No significant occupational or environmental exposures    Family History:   History reviewed. No pertinent family history.    Objective   /88 (BP Location: Left arm, Patient Position: Sitting, Cuff Size: Large)   Pulse 101   Temp 97.9 °F (36.6 °C) (Tympanic)   Ht 5' 6\" (1.676 m)   Wt 93 kg (205 lb)   SpO2 97%   BMI 33.09 kg/m²   Physical Exam:   Gen:  Comfortable on room air.  No conversational dyspnea  HEENT:  Conjugate gaze.  sclerae anicteric.  Oropharynx moist  Neck: Trachea is midline. No JVD. No adenopathy  Chest: Has slightly limited chest wall excursion but breath sounds are clear bilaterally.  No wheezes or crackles   Cardiac: Regular. no murmur  Abdomen:  " benign  Extremities: No edema  Neuro:  Normal speech and mentation    Diagnostic Data:  Labs: I personally reviewed the most recent laboratory data pertinent to today's visit.  Last BNP in March was elevated at 321  Recent CBC January 26, 2025 did not show any eosinophilia.  White blood cell count was normal.  Hemoglobin was normal.    Radiology results:    PA and lateral chest x-ray from 3/10/2025 was viewed on the PACS system and shows clear lung fields.  No edema or effusion.    PFT/spirometry results:  Spirometry performed today shows moderate restriction  FeNO performed today is 14 ppb consistent with low score.  Airway inflammation unlikely.    Jet Rodriguez MD

## 2025-04-22 NOTE — ASSESSMENT & PLAN NOTE
He has diastolic dysfunction with low normal ejection fraction  Recent MI and does have wall motion abnormalities on echo  Suspect there is a element of cardiovascular limitation, as well as medication effect from Brilinta and metoprolol  Orders:    Ambulatory Referral to Pulmonology

## 2025-04-22 NOTE — ASSESSMENT & PLAN NOTE
Previously diagnosed moderate sleep apnea.  This was diagnosed in 2021.  Currently has not been using CPAP for years  Does have loud snoring, significant cardiovascular risk factors, oropharyngeal crowding, poor sleep quality and daytime somnolence  He would significantly benefit from returning to CPAP use  Will need new diagnostic study to qualify and he should have in-lab testing due to his cardiovascular risk factors.  Polysomnogram has been ordered  He is scheduled to see sleep medicine in June.  His study should be performed in a more expedited fashion however and I have reached out to the sleep medicine physician scheduled to see him in consultation  Neck circumference 16 inches  Orders:    Ambulatory Referral to Sleep Medicine; Future

## 2025-04-22 NOTE — ASSESSMENT & PLAN NOTE
Currently controlled on Advair 230/21  Limited use of albuterol and no significant wheezing  Spirometry is most consistent with restriction which may be a function of weight  Airway inflammation score is low suggesting well-controlled asthma  I believe his shortness of breath may be related to combination of recent cardiovascular events, Brilinta, and metoprolol.  Would recommend he continue with cardiac rehab and focus on diet and weight loss as well  Orders:    Ambulatory Referral to Pulmonology    POCT spirometry    POCT FeNO

## 2025-04-22 NOTE — ASSESSMENT & PLAN NOTE
Follows with Dr. Mcguire  Possible Plavix failure which is why he needs Brilinta  Orders:    Ambulatory Referral to Pulmonology

## 2025-04-23 ENCOUNTER — TELEPHONE (OUTPATIENT)
Age: 60
End: 2025-04-23

## 2025-04-23 ENCOUNTER — TELEPHONE (OUTPATIENT)
Dept: PULMONOLOGY | Facility: CLINIC | Age: 60
End: 2025-04-23

## 2025-04-23 NOTE — TELEPHONE ENCOUNTER
Patient is calling because he states someone from our office called his wife yesterday and now they are calling back.    Please advise.

## 2025-04-23 NOTE — TELEPHONE ENCOUNTER
Called Segun to his wife phone number to let him know he will be contacted by sleep clinic to this phone number to schedule his sleep study.

## 2025-04-25 RX ORDER — ACETAMINOPHEN AND CODEINE PHOSPHATE 300; 30 MG/1; MG/1
1 TABLET ORAL EVERY 4 HOURS PRN
Qty: 16 TABLET | Refills: 0 | Status: SHIPPED | OUTPATIENT
Start: 2025-04-25

## 2025-04-26 RX ORDER — FUROSEMIDE 20 MG/1
20 TABLET ORAL
Qty: 30 TABLET | Refills: 3 | Status: SHIPPED | OUTPATIENT
Start: 2025-04-26

## 2025-04-28 ENCOUNTER — TELEPHONE (OUTPATIENT)
Dept: FAMILY MEDICINE CLINIC | Facility: CLINIC | Age: 60
End: 2025-04-28

## 2025-04-28 NOTE — TELEPHONE ENCOUNTER
PT came in and stated he got a notice saying that his [employer] was Denied. He wants to know if he needs a Prior Auth           Please advise?

## 2025-04-28 NOTE — TELEPHONE ENCOUNTER
Pt is requesting medication refill for     acetaminophen-codeine     Pt was at the dentist today and the dentist said for him to come down and ask for a refill for this tylenol and the dentist sent in Ibuprofen for him and said together the pain will be under control    Please send to pharmacy on file

## 2025-04-28 NOTE — PROGRESS NOTES
Cardiology Follow Up    [facility]  [address] [place] [address]  PHONE: [phone]  FAX: [phone]    Segun Alberts  1965  [id number]    Assessment/Plan:  1. Coronary artery disease, history of NSTEMI, status post PCI of proximal LAD 1/16/2025, history of STEMI 1/24/2025 weight 100% occlusion due to stent thrombosis, status post PCI with thrombectomy and drug-eluting stent placement.  2. Hypertensive heart disease without heart failure, severe left ventricular hypertrophy  3. Diabetes mellitus  4. Obesity  5. Dyslipidemia  6. Asthma  7. Obstructive sleep apnea, on CPAP therapy  8. Pre-operative risk stratification    Still c/o right-sided and back chest pain which is MSK origin.     Stopped going to cardiac rehab in April.    Sleep med appt for new CPAP is June.    He has not experienced leg swelling so he has not required home dosing of the PRN Lasix. Continue low-salt diet.    He has CHUN which is probably from recent cardiac events and Brilinta. He has h/o Plavix failure so I will switch Brilinta to Effient and monitor sxs.    He is asking for clearance for some procedure from [facility]. He has to continue uninterrupted DAPT for at least 1 year after stenting until Jan 2026. If [facility] is ok to complete the procedure while he is on DAPT, then he has low risk for cardiac complication from a dental procedure which is inherently low risk. There is no cardiac contraindication to proceeding with dental surgery.    RTO in the summer w/ Dr. Mcguire    Interval History:    Segun Alberts is a 59 y.o. male with PMH as below who presents to the office for routine CV f/u.  Patient reports he can hardly walk 1 block without having to stop to catch his breath due to CHUN.  Patient reports this has been present ever since being discharged from the hospital after second heart attack in January.  Patient reports sometimes waking from sleep short of breath having  to sit up to catch his breath.  Patient denies coughing or pleurisy.  He is compliant with all of his medications.  He is looking for work in order to help his wife support themselves but at this point it would have to be pretty sedentary job since he cannot walk 100 feet carrying nearly any amount of weight even 5 pounds without feeling CHUN.    Past Medical History:   Diagnosis Date    Asthma     Hyperlipidemia     Hypertension     Sinus congestion       Past Surgical History:   Procedure Laterality Date    CARDIAC CATHETERIZATION Left 1/16/2025    Procedure: Cardiac Left Heart Cath;  Surgeon: Tammy Woodard DO;  Location: [facility];  Service: Cardiology    CARDIAC CATHETERIZATION N/A 1/16/2025    Procedure: Cardiac Coronary Angiogram;  Surgeon: Tammy Woodard DO;  Location: [facility];  Service: Cardiology    CARDIAC CATHETERIZATION  1/16/2025    Procedure: Left Heart Catherization;  Surgeon: Tammy Woodard DO;  Location: [facility];  Service: Cardiology    CARDIAC CATHETERIZATION N/A 1/16/2025    Procedure: Cardiac PCI;  Surgeon: Tammy Woodard DO;  Location: [facility];  Service: Cardiology    CARDIAC CATHETERIZATION N/A 1/24/2025    Procedure: Cardiac PCI;  Surgeon: Tobi Hudson MD;  Location: [facility];  Service: Cardiology    CARDIAC CATHETERIZATION N/A 1/24/2025    Procedure: Cardiac Coronary Angiogram;  Surgeon: Tobi Hudson MD;  Location: [facility];  Service: Cardiology    CARPAL TUNNEL RELEASE      right hand      No family history on file.   Current Outpatient Medications:     acetaminophen-codeine (TYLENOL with CODEINE #3) 300-30 MG per tablet, Take 1 tablet by mouth every 4 (four) hours as needed for moderate pain or severe pain, Disp: 16 tablet, Rfl: 2    albuterol (PROVENTIL HFA,VENTOLIN HFA) 90 mcg/act inhaler, Inhale 1 puff every 6 (six) hours as needed for wheezing, Disp: 8.5 g, Rfl: 5    aspirin 81 mg chewable  "tablet, Chew 1 tablet (81 mg total) daily, Disp: 30 tablet, Rfl: 11    atorvastatin (LIPITOR) 80 mg tablet, Take 1 tablet (80 mg total) by mouth daily with dinner, Disp: , Rfl:     Blood Pressure Monitoring ([employer] BP Monitor/Arm) KELLY, Use daily as needed (dizziness), Disp: 1 each, Rfl: 0    fluticasone (FLONASE) 50 mcg/act nasal spray, 1 spray into each nostril daily, Disp: 15.8 mL, Rfl: 5    fluticasone-salmeterol (Advair HFA) 230-21 MCG/ACT inhaler, Inhale 2 puffs 2 (two) times a day Rinse mouth after use., Disp: 24 g, Rfl: 3    furosemide (LASIX) 20 mg tablet, TAKE 1 TABLET (20 MG TOTAL) BY MOUTH EVERY OTHER DAY AS NEEDED (LOWER EXTREMITY EDEMA), Disp: 30 tablet, Rfl: 3    isosorbide mononitrate (IMDUR) 30 mg 24 hr tablet, Take 1 tablet (30 mg total) by mouth daily, Disp: 30 tablet, Rfl: 11    losartan (COZAAR) 50 mg tablet, Take 1 tablet (50 mg total) by mouth daily after lunch, Disp: , Rfl:     metFORMIN (GLUCOPHAGE-XR) 500 mg 24 hr tablet, Take 1 tablet (500 mg total) by mouth daily with dinner, Disp: 90 tablet, Rfl: 0    metoprolol tartrate (LOPRESSOR) 25 mg tablet, Take 2 tablets (50 mg total) by mouth every 12 (twelve) hours, Disp: 120 tablet, Rfl: 11    prasugrel (EFFIENT) tablet, Take 6 pills 24 hours after last Brilinta dose, then start 10 mg daily the following morning, Disp: 36 tablet, Rfl: 0    spironolactone (ALDACTONE) 50 mg tablet, Take 1 tablet (50 mg total) by mouth daily, Disp: , Rfl:     traZODone (DESYREL) 50 mg tablet, Take 1 tablet (50 mg total) by mouth daily at bedtime as needed for sleep, Disp: 30 tablet, Rfl: 2     No Known Allergies    Physical Exam:  Vitals:    05/05/25 1039   BP: 130/70   Pulse: 88   SpO2: 97%     Vitals:    05/05/25 1039   Weight: 90.7 kg (200 lb)     Height: 5' 6\" (167.6 cm) Body mass index is 32.28 kg/m².    Physical Exam  Vitals and nursing note reviewed.   Constitutional:       General: He is not in acute distress.     Appearance: He is not " ill-appearing.   HENT:      Head: Normocephalic and atraumatic.      Nose: No congestion.      Mouth/Throat:      Mouth: Mucous membranes are moist.   Eyes:      Conjunctiva/sclera: Conjunctivae normal.   Neck:      Vascular: No carotid bruit.      Comments: - JVD  Cardiovascular:      Rate and Rhythm: Normal rate and regular rhythm.      Heart sounds: S1 normal and S2 normal. No murmur heard.  Pulmonary:      Effort: Pulmonary effort is normal.      Breath sounds: No wheezing, rhonchi or rales.   Abdominal:      Comments: + central obesity   Musculoskeletal:      Comments: Trace edema bl le in the ankles   Skin:     General: Skin is warm and dry.   Neurological:      General: No focal deficit present.      Mental Status: He is alert.   Psychiatric:         Behavior: Behavior normal.     Lilian Goode PA-C  [facility]

## 2025-04-28 NOTE — TELEPHONE ENCOUNTER
We sent a prior auth. If he says it was denied, then it was denied. We will have to discuss other weight loss options or I can give him a referral to the Weight Management program.

## 2025-04-29 ENCOUNTER — PATIENT OUTREACH (OUTPATIENT)
Dept: FAMILY MEDICINE CLINIC | Facility: CLINIC | Age: 60
End: 2025-04-29

## 2025-04-29 NOTE — PROGRESS NOTES
Outgoing Call:  4/29/2025    CMOC made third attempt to contact Pt. Pt's wife, Luli answered call and informed she will give Pt message and have him call CMOC. [facility] is attempting to assist Pt in applying for [facility]. [facility] informed a final attempt will be made next week. Luli expressed understanding and informed he will call by end of day. Note routed to Kristie.     Final outreach is scheduled for 5/6/2025.

## 2025-05-01 NOTE — TELEPHONE ENCOUNTER
The dentist is the one who prescribed this and it was just sent two days ago. So I am not sure that refill is appropriate. If the dentist feels it is, they can refill it.

## 2025-05-05 ENCOUNTER — PATIENT OUTREACH (OUTPATIENT)
Dept: FAMILY MEDICINE CLINIC | Facility: CLINIC | Age: 60
End: 2025-05-05

## 2025-05-05 ENCOUNTER — OFFICE VISIT (OUTPATIENT)
Dept: CARDIOLOGY CLINIC | Facility: CLINIC | Age: 60
End: 2025-05-05
Payer: COMMERCIAL

## 2025-05-05 VITALS
WEIGHT: 200 LBS | SYSTOLIC BLOOD PRESSURE: 130 MMHG | OXYGEN SATURATION: 97 % | HEART RATE: 88 BPM | BODY MASS INDEX: 32.14 KG/M2 | DIASTOLIC BLOOD PRESSURE: 70 MMHG | HEIGHT: 66 IN

## 2025-05-05 DIAGNOSIS — I21.02 STEMI INVOLVING LEFT ANTERIOR DESCENDING CORONARY ARTERY (HCC): Primary | ICD-10-CM

## 2025-05-05 DIAGNOSIS — Z01.21 ENCOUNTER FOR DENTAL EXAMINATION AND CLEANING WITH ABNORMAL FINDINGS: ICD-10-CM

## 2025-05-05 PROCEDURE — 99215 OFFICE O/P EST HI 40 MIN: CPT | Performed by: PHYSICIAN ASSISTANT

## 2025-05-05 RX ORDER — PSEUDOEPHED/ACETAMINOPH/DIPHEN 30MG-500MG
TABLET ORAL
Qty: 30 TABLET | Refills: 0 | Status: SHIPPED | OUTPATIENT
Start: 2025-05-05 | End: 2025-05-12

## 2025-05-05 RX ORDER — PRASUGREL 10 MG/1
TABLET, FILM COATED ORAL
Qty: 36 TABLET | Refills: 0 | Status: SHIPPED | OUTPATIENT
Start: 2025-05-05

## 2025-05-05 NOTE — PROGRESS NOTES
JUSTIN POTTER had called the patient via phone. JUSTIN POTTER had spoke in the preferred language, Syriac. Patient stated he has been busy and apologized for not getting back to [facility] Madhuri Wade. JUSTIN POTTER informed patient that Madhuri cannot apply for [employer]anta [employer] if he does not respond to calls. JUSTIN POTTER provided Madhuri's contact number which is [phone]. Patient stated he would call today. JUSTIN POTETR routed note to Madhuri. JUSTIN POTTER will continue f/u.

## 2025-05-06 ENCOUNTER — PATIENT OUTREACH (OUTPATIENT)
Dept: FAMILY MEDICINE CLINIC | Facility: CLINIC | Age: 60
End: 2025-05-06

## 2025-05-06 ENCOUNTER — TELEPHONE (OUTPATIENT)
Dept: CARDIOLOGY CLINIC | Facility: CLINIC | Age: 60
End: 2025-05-06

## 2025-05-06 NOTE — PROGRESS NOTES
Web:  5/6/2025    [facility] completed a [employer] application via [facility] website. Note routed to Kristie POTTER. Patient was referred on Findhelp to [employer] for transportation services.     Next outreach is scheduled for 5/9/2025.

## 2025-05-06 NOTE — TELEPHONE ENCOUNTER
PA for prasugrel 10 mg  all strengths APPROVED     Date(s) approved 5/6/25-5/6/26    Case #    Patient advised by          []MyChart Message  [x]Phone call twice with  patient picked up and hangs up  []LMOM  []L/M to call office as no active Communication consent on file  [x]Unable to leave detailed message as VM not approved on Communication consent       Pharmacy advised by    [x]Fax  []Phone call  []Secure Chat    Specialty Pharmacy    []     Approval letter scanned into Media Yes

## 2025-05-06 NOTE — TELEPHONE ENCOUNTER
PA for prasugrel 10 mg SUBMITTED to [employer]     via    [x]CMM-KEY: [id number]  []Surescripts-Case ID #   []Availity-Auth ID # NDC #   []Faxed to plan   []Other website   []Phone call Case ID #     [x]PA sent as URGENT    All office notes, labs and other pertaining documents and studies sent. Clinical questions answered. Awaiting determination from insurance company.     Turnaround time for your insurance to make a decision on your Prior Authorization can take 7-21 business days.

## 2025-05-06 NOTE — TELEPHONE ENCOUNTER
----- Message from Lilian Goode PA-C sent at 5/5/2025  4:34 PM EDT -----  Regarding: RE: check price  Ok how do we start prior auth        Lilian  ----- Message -----  From: Ysabel Belcher MA  Sent: 5/5/2025   3:06 PM EDT  To: Lilian Goode PA-C  Subject: RE: check price                                  Contact was made with [facility] pharmacy. The lady states that the insurance is covering 30 pills for 30 days with no copay. But if he would need more than that, then he needs a prior authorization done.  ----- Message -----  From: Lilian Goode PA-C  Sent: 5/5/2025  12:56 PM EDT  To: Ysabel Belcher MA  Subject: check price                                      Hi can you please call his pharmacy to check the price of effient thank you    [phone]      Lilian

## 2025-05-07 ENCOUNTER — TELEPHONE (OUTPATIENT)
Dept: FAMILY MEDICINE CLINIC | Facility: CLINIC | Age: 60
End: 2025-05-07

## 2025-05-08 ENCOUNTER — PATIENT OUTREACH (OUTPATIENT)
Dept: FAMILY MEDICINE CLINIC | Facility: CLINIC | Age: 60
End: 2025-05-08

## 2025-05-08 NOTE — PROGRESS NOTES
Outgoing Calls:  5/8/2025    [facility] received a message via Epic Chart from MRAgnies[employer] informing Pt had come to [facility] yesterday and requested assistance with transportation. OC thanked her for the notice.     OC had previously completed Pt's [employer] application via [facility] website. Pt is required to schedule an evaluation with Lanta to determine eligibility. [facility] had previously informed Pt of this. [facility] did attempt to call Pt and inform of this. Detailed VM left requesting a call in return. [facility] called Lanta and spoke with Soledad who informed she had attempted to call him yesterday and will try again today. She informed if Pt does not call back by end of day to schedule his evaluation she will send a notice in the mail.     [facility] called Pt again and he answered call. Pt informed he has been trying to get transportation assistance and no one has been assisting him with this. [facility] reminded Pt she spoke with him on 5/5/25 and informed of application process for Lanta. [facility] also informed the application was completed and Lanta has attempted to call him. Pt denies receiving any calls. [facility] offered to facilitate a three way call to [facility] to schedule his evaluation. Pt agreed to this.     [facility] called [facility] and spoke with Soledad. An appointment was scheduled for 5/13/25 at 9 AM of which Pt agreed to. [facility] will provide transportation. Pt thanked [facility] for her assistance.     Note routed to Kristie POTTER.     Next outreach is scheduled for 5/13/25.

## 2025-05-12 DIAGNOSIS — I21.3 STEMI (ST ELEVATION MYOCARDIAL INFARCTION) (HCC): ICD-10-CM

## 2025-05-12 RX ORDER — LOSARTAN POTASSIUM 50 MG/1
50 TABLET ORAL
Qty: 90 TABLET | Refills: 0 | Status: SHIPPED | OUTPATIENT
Start: 2025-05-12 | End: 2025-08-10

## 2025-05-13 ENCOUNTER — PATIENT OUTREACH (OUTPATIENT)
Dept: FAMILY MEDICINE CLINIC | Facility: CLINIC | Age: 60
End: 2025-05-13

## 2025-05-13 ENCOUNTER — TELEPHONE (OUTPATIENT)
Dept: CARDIOLOGY CLINIC | Facility: CLINIC | Age: 60
End: 2025-05-13

## 2025-05-13 NOTE — PROGRESS NOTES
Outgoing Calls:  5/13/2025    CMOC called [facility] and spoke with Soledad to confirm Pt's scheduled evaluation was completed this morning. Soledad informed Pt cancelled at the door once Fátima arrived to his home. Soledad informed Pt will need to call back and reschedule.     CMOC called Pt and he informed he did not have a scheduled appointment. CMOC reminded Pt she called with him last week to schedule the assessment. Pt then states he has no idea what CMOC was referencing to. CMOC reminded Pt of [facility] referral and asked Pt if he is still interested in moving forward with application. Pt stated he is. Pt then informed he had another appointment to go to for legal issues and could not cancel that appointment. CMOC assisted Pt in calling Soledad and rescheduled his evaluation for 5/20/2025 at 11 AM. [facility] to provide transportation. Pt thanked [facility] for her assistance.     Note routed to Kristie POTTER.     Next outreach is scheduled for 5/20/2025.

## 2025-05-13 NOTE — TELEPHONE ENCOUNTER
Called pt and spoke with his spouse. Informed him that due to a change in the providers schedule we had to reschedule the appointment from 07/24/25 and she chose to come in 06/10/25 . She agreed to the date change

## 2025-05-14 DIAGNOSIS — E11.9 TYPE 2 DIABETES MELLITUS WITHOUT COMPLICATION, WITHOUT LONG-TERM CURRENT USE OF INSULIN (HCC): ICD-10-CM

## 2025-05-14 RX ORDER — METFORMIN HYDROCHLORIDE 500 MG/1
500 TABLET, EXTENDED RELEASE ORAL
Qty: 30 TABLET | Refills: 0 | Status: SHIPPED | OUTPATIENT
Start: 2025-05-14 | End: 2025-08-12

## 2025-05-16 ENCOUNTER — TELEPHONE (OUTPATIENT)
Dept: CARDIAC REHAB | Facility: CLINIC | Age: 60
End: 2025-05-16

## 2025-05-19 ENCOUNTER — PATIENT OUTREACH (OUTPATIENT)
Dept: FAMILY MEDICINE CLINIC | Facility: CLINIC | Age: 60
End: 2025-05-19

## 2025-05-19 NOTE — PROGRESS NOTES
Incoming Call:  5/19/2025    CMOC received a call from Soledad at [facility] and she informed she reached out to Pt to confirm tomorrow's rescheduled [facility] evaluation. Soledad states she spoke with Pt's wife, Luli who confirmed the appointment. CMOC will follow up with Pt tomorrow.     Note routed to Kristie POTTER.     Next outreach is scheduled for 5/21/2025.

## 2025-05-22 ENCOUNTER — PATIENT OUTREACH (OUTPATIENT)
Dept: FAMILY MEDICINE CLINIC | Facility: CLINIC | Age: 60
End: 2025-05-22

## 2025-05-22 NOTE — PROGRESS NOTES
OP TARIQ had contacted the patient via phone. OP SW following up regarding Lanta Van evaluation if patient attended it. OP TARIQ left a detailed voicemail in Lebanese. OP SW will attempt to call again at a later date and time. OP TARIQ routed note to CM. OP TARIQ will continue to f/u.

## 2025-05-27 ENCOUNTER — OFFICE VISIT (OUTPATIENT)
Dept: FAMILY MEDICINE CLINIC | Facility: CLINIC | Age: 60
End: 2025-05-27

## 2025-05-27 ENCOUNTER — PATIENT OUTREACH (OUTPATIENT)
Dept: FAMILY MEDICINE CLINIC | Facility: CLINIC | Age: 60
End: 2025-05-27

## 2025-05-27 VITALS
DIASTOLIC BLOOD PRESSURE: 70 MMHG | BODY MASS INDEX: 32.62 KG/M2 | OXYGEN SATURATION: 97 % | HEART RATE: 102 BPM | RESPIRATION RATE: 18 BRPM | WEIGHT: 203 LBS | HEIGHT: 66 IN | TEMPERATURE: 98.3 F | SYSTOLIC BLOOD PRESSURE: 128 MMHG

## 2025-05-27 DIAGNOSIS — N64.4 NIPPLE PAIN: Primary | ICD-10-CM

## 2025-05-27 PROCEDURE — 3074F SYST BP LT 130 MM HG: CPT

## 2025-05-27 PROCEDURE — 3078F DIAST BP <80 MM HG: CPT

## 2025-05-27 PROCEDURE — 99213 OFFICE O/P EST LOW 20 MIN: CPT

## 2025-05-27 NOTE — PROGRESS NOTES
Outgoing Call:  5/27/2025    CMOC called [facility] to confirm evaluation was completed for [facility] services. Soledad informed Pt did attend his scheduled evaluation and [facility] has 21 days to make a decision on eligibility. CMOC to follow up.     Note routed to Kristie POTTER.     Next outreach is scheduled for 6/6/2025.

## 2025-05-27 NOTE — PROGRESS NOTES
"Name: Segun Alberts      : 1965      MRN: [id number]  Encounter Provider: MAREN Ramirez  Encounter Date: 2025   Encounter department: [facility]  :  Assessment & Plan  Nipple pain  Patient reports pain in his nipples for the past two months. No color changes, no discharge, no enlarging breast tissue.   Exam is unremarkable and no abnormality of the nipple or breast area appreciated.   Will trial Triple Paste for symptomatic relief.     Orders:    white petrolatum-corn starch-lanolin (TRIPLE PASTE) 12.8 % ointment; Apply topically as needed for irritation           History of Present Illness         Segun Alberts  is a 59 year old male with a history of HTN, HLD, asthma, T2DM, STEMI. He presents to the office today for complaint of nipple pain. He states this pain started 2 months ago. He denies any nipple color changes, discharge, changing breast tissue including increasing size, lumps or wounds. He denies fever, body aches, chills, malaise, sore throat, nausea, vomiting, diarrhea, rash.         Review of Systems   Constitutional:  Negative for chills and fever.   HENT:  Negative for ear pain and sore throat.    Eyes:  Negative for pain and visual disturbance.   Respiratory:  Negative for cough and shortness of breath.    Cardiovascular:  Negative for chest pain and palpitations.   Gastrointestinal:  Negative for abdominal pain and vomiting.   Genitourinary:  Negative for dysuria and hematuria.   Musculoskeletal:  Negative for arthralgias and back pain.   Skin:  Negative for color change and rash.   Neurological:  Negative for seizures and syncope.   All other systems reviewed and are negative.      Objective   /70 (BP Location: Left arm, Patient Position: Sitting, Cuff Size: Large)   Pulse 102   Temp 98.3 °F (36.8 °C) (Temporal)   Resp 18   Ht 5' 6\" (1.676 m)   Wt 92.1 kg (203 lb)   SpO2 97%   BMI 32.77 kg/m²      Physical " Exam  Vitals and nursing note reviewed.   Constitutional:       General: He is not in acute distress.     Appearance: He is well-developed.   HENT:      Head: Normocephalic and atraumatic.     Eyes:      Conjunctiva/sclera: Conjunctivae normal.       Cardiovascular:      Rate and Rhythm: Normal rate and regular rhythm.      Heart sounds: No murmur heard.  Pulmonary:      Effort: Pulmonary effort is normal. No respiratory distress.      Breath sounds: Normal breath sounds.   Abdominal:      Palpations: Abdomen is soft.      Tenderness: There is no abdominal tenderness.     Musculoskeletal:         General: No swelling.      Cervical back: Neck supple.     Skin:     General: Skin is warm and dry.      Capillary Refill: Capillary refill takes less than 2 seconds.      Findings: No erythema, lesion or rash.     Neurological:      Mental Status: He is alert.     Psychiatric:         Mood and Affect: Mood normal.

## 2025-06-06 ENCOUNTER — PATIENT OUTREACH (OUTPATIENT)
Dept: FAMILY MEDICINE CLINIC | Facility: CLINIC | Age: 60
End: 2025-06-06

## 2025-06-06 NOTE — PROGRESS NOTES
Outgoing Calls:  6/6/2025    CMOC called [facility] to request status of [employer] application. Nancy informed Pt has been approved however required to pay for trips since his name on state ID does not match name on [place] card. Pt is required to provide copy if his SS card and state ID to request name is updated on [email or web address] system. Pt's name on state ID shows two last names however [place] card only has one last name. CMOC called Pt and informed of this. CMOC offered to assist with providing information to DPW. Pt agreed to this.     Note routed to CM Team.     Next outreach is scheduled for 6/9/2025 at 2 PM at [facility].

## 2025-06-09 ENCOUNTER — PATIENT OUTREACH (OUTPATIENT)
Dept: FAMILY MEDICINE CLINIC | Facility: CLINIC | Age: 60
End: 2025-06-09

## 2025-06-09 NOTE — PROGRESS NOTES
Outgoing Call:  6/9/2025    CMOC called Pt and apportionment to meet today was rescheduled for 6/11/2025 at 2 PM at [facility].     Note routed to CM Team.

## 2025-06-10 ENCOUNTER — OFFICE VISIT (OUTPATIENT)
Dept: CARDIOLOGY CLINIC | Facility: CLINIC | Age: 60
End: 2025-06-10
Payer: COMMERCIAL

## 2025-06-10 VITALS
WEIGHT: 195.6 LBS | HEIGHT: 66 IN | HEART RATE: 84 BPM | BODY MASS INDEX: 31.43 KG/M2 | SYSTOLIC BLOOD PRESSURE: 110 MMHG | DIASTOLIC BLOOD PRESSURE: 70 MMHG

## 2025-06-10 DIAGNOSIS — I21.02 STEMI INVOLVING LEFT ANTERIOR DESCENDING CORONARY ARTERY (HCC): ICD-10-CM

## 2025-06-10 DIAGNOSIS — E78.5 DYSLIPIDEMIA: ICD-10-CM

## 2025-06-10 DIAGNOSIS — I25.10 CORONARY ARTERY DISEASE INVOLVING NATIVE CORONARY ARTERY OF NATIVE HEART WITHOUT ANGINA PECTORIS: Primary | ICD-10-CM

## 2025-06-10 DIAGNOSIS — I10 PRIMARY HYPERTENSION: ICD-10-CM

## 2025-06-10 DIAGNOSIS — G47.33 MODERATE OBSTRUCTIVE SLEEP APNEA: ICD-10-CM

## 2025-06-10 DIAGNOSIS — E11.59 TYPE 2 DIABETES MELLITUS WITH OTHER CIRCULATORY COMPLICATION, WITHOUT LONG-TERM CURRENT USE OF INSULIN (HCC): ICD-10-CM

## 2025-06-10 PROCEDURE — 99214 OFFICE O/P EST MOD 30 MIN: CPT | Performed by: INTERNAL MEDICINE

## 2025-06-10 RX ORDER — PRASUGREL 10 MG/1
10 TABLET, FILM COATED ORAL DAILY
Qty: 90 TABLET | Refills: 3 | Status: SHIPPED | OUTPATIENT
Start: 2025-06-10

## 2025-06-10 NOTE — ASSESSMENT & PLAN NOTE
Refill prescription for prasugrel.  Orders:    prasugrel (EFFIENT) tablet; Take 1 tablet (10 mg total) by mouth daily

## 2025-06-10 NOTE — PATIENT INSTRUCTIONS
Assessment & Plan  Coronary artery disease involving native coronary artery of native heart without angina pectoris  Complaints of generalized atypical pains in the breast tissue and surrounding regions.  Blood pressure is well-controlled.  Reviewing medications it is noted that he was accidentally taking 6 pills of prasugrel every day and that is why he ran out of the medications prematurely.  Physical examination is overall unremarkable except for increased BMI.  Advising to take medications regularly.  Corrected the dosing of lisinopril to 10 mg once daily.         Moderate obstructive sleep apnea  Has appointment with sleep medicine tomorrow.       Type 2 diabetes mellitus with other circulatory complication, without long-term current use of insulin ([facility])    Lab Results   Component Value Date    HGBA1C 6.1 04/21/2025   Diabetes is well-controlled.  Advised to try to lose weight through diet and physical changes.         Dyslipidemia  Has severe dyslipidemia.  Is on high intensity statin.  Will add ezetimibe 10 mg daily.       Body mass index (BMI) 32.0-32.9, adult  Encourage weight loss through dietary changes and physical activity.       Primary hypertension  Blood pressure is well-controlled.  Is on multiple antihypertensive medications.  Needs follow-up blood work within the next 3 months.  Advised to follow-up with primary care physician in 3 months.       STEMI involving left anterior descending coronary artery ([facility])  Refill prescription for prasugrel.  Orders:    prasugrel (EFFIENT) tablet; Take 1 tablet (10 mg total) by mouth daily

## 2025-06-10 NOTE — ASSESSMENT & PLAN NOTE
Complaints of generalized atypical pains in the breast tissue and surrounding regions.  Blood pressure is well-controlled.  Reviewing medications it is noted that he was accidentally taking 6 pills of prasugrel every day and that is why he ran out of the medications prematurely.  Physical examination is overall unremarkable except for increased BMI.  Advising to take medications regularly.  Corrected the dosing of lisinopril to 10 mg once daily.

## 2025-06-10 NOTE — ASSESSMENT & PLAN NOTE
Lab Results   Component Value Date    HGBA1C 6.1 04/21/2025   Diabetes is well-controlled.  Advised to try to lose weight through diet and physical changes.

## 2025-06-10 NOTE — ASSESSMENT & PLAN NOTE
Blood pressure is well-controlled.  Is on multiple antihypertensive medications.  Needs follow-up blood work within the next 3 months.  Advised to follow-up with primary care physician in 3 months.

## 2025-06-10 NOTE — PROGRESS NOTES
Name: Segun Abdul      : 1965      MRN: [id number]  Encounter Provider: Maninder Mcguire MD  Encounter Date: 6/10/2025   Encounter department: [facility]    DIAGNOSES:  1. Coronary artery disease, history of NSTEMI, status post PCI of proximal LAD 2025, history of STEMI 2025 weight 100% occlusion due to stent thrombosis, status post PCI with thrombectomy and drug-eluting stent placement.  2. Hypertensive heart disease without heart failure, severe left ventricular hypertrophy  3. Diabetes mellitus  4. Obesity  5. Dyslipidemia  6. Asthma  7. Obstructive sleep apnea, on CPAP therapy    ECHOCARDIOGRAM 3/26/2025:  Normal left ventricular size, low normal left ventricular systolic function, grade 1 diastolic dysfunction.  EF 50%.  Akinesis of the apex and hypokinesis of the surrounding walls.  Normal RV size and function.  Normal left and right atrial size.  Mildly thickened and calcified aortic valve without stenosis or regurgitation.  Back.  No MR.  No TR or PI.  No pulmonary hypertension  No pericardial effusion.    TTE 2025: Limited study.  Left ventricular ejection fraction 50%, moderate hypokinesis of anteroseptal wall from mid to apex and anterior wall mid to apex and apical wall.  Normal RV size and function, dilated LA, normal RV size, MAC, trivial pericardial effusion.    TTE (2025):  Severe concentric left ventricular hypertrophy, normal LV function EF 68%, grade 1 diastolic dysfunction, aortic valve sclerosis and mitral valve sclerosis, no significant stenosis or regurgitation.  No left or right atrial or RV enlargement, trace tricuspid valve regurgitation, no pulmonary hypertension.    CARDIAC CATH 2025: 100% occlusion of proximal LAD stent with thrombus. uccessful PCI with thrombectomy and placement of a 3.0 x 34 mm MAGALI post dilated to 3.5 mm.     CARDIAC CATH 2025:  Left main: Moderate size, angiographically normal.  LAD: Moderate size, severe  diffuse disease throughout vessel.  95% proximal LAD stenosis, 50% mid LAD stenosis, distal LAD with 2 lesions 55 and 60%.  LCx: Moderate diffuse disease, 55% mid LCx stenosis.  RCA: Moderate size moderate diffuse disease throughout vessel.  RPDA moderate-sized with diffuse disease.  RPL B artery small with moderate diffuse disease.  Assessment & Plan  Coronary artery disease involving native coronary artery of native heart without angina pectoris  Complaints of generalized atypical pains in the breast tissue and surrounding regions.  Blood pressure is well-controlled.  Reviewing medications it is noted that he was accidentally taking 6 pills of prasugrel every day and that is why he ran out of the medications prematurely.  Physical examination is overall unremarkable except for increased BMI.  Advising to take medications regularly.  Corrected the dosing of lisinopril to 10 mg once daily.         Moderate obstructive sleep apnea  Has appointment with sleep medicine tomorrow.       Type 2 diabetes mellitus with other circulatory complication, without long-term current use of insulin ([facility])    Lab Results   Component Value Date    HGBA1C 6.1 04/21/2025   Diabetes is well-controlled.  Advised to try to lose weight through diet and physical changes.         Dyslipidemia  Has severe dyslipidemia.  Is on high intensity statin.  Will add ezetimibe 10 mg daily.       Body mass index (BMI) 32.0-32.9, adult  Encourage weight loss through dietary changes and physical activity.       Primary hypertension  Blood pressure is well-controlled.  Is on multiple antihypertensive medications.  Needs follow-up blood work within the next 3 months.  Advised to follow-up with primary care physician in 3 months.       STEMI involving left anterior descending coronary artery (HCC)  Refill prescription for prasugrel.  Orders:    prasugrel (EFFIENT) tablet; Take 1 tablet (10 mg total) by mouth daily        History of Present Illness    MAAME Abdul is a 59 y.o. male who presents regarding follow-up of his coronary artery disease, severe LVH, hypertensive heart disease without heart failure, dyslipidemia, sleep apnea and other comorbidities.  He was last seen in office on 5/5/2025 by advanced practitioner.    History obtained from: patient    He mentions that since last visit he has had no new diagnoses or hospitalizations or significant illnesses.  From a symptom perspective he reports that he continues to experience pain on the right side of the chest.  Pain is is more in the breast tissue.  Reports that it is hard in that region.  Has completed cardiac rehab.  Reports some headache at times.  Denies orthopnea or PND or worsening pedal edema    Functional capacity status: Moderately decreased   (Excellent- >10 METs; Good: (7-10 METs); Moderate (4-7 METs); Poor (<= 4 METs)    Any chronic stressors: Financial stress   (feeling of poor health, financial problems, and social isolation etc).    Tobacco or alcohol dependence: Does not smoke.  Does not drink.    Current cardiac medications: Aspirin 81 mg daily + atorvastatin 80 mg daily + isosorbide mononitrate 30 mg daily + losartan 50 mg daily + metoprolol tartrate 50 mg twice daily + spironolactone 50 mg daily + prasugrel 10 mg daily.    Last recent comprehensive blood work available:   Blood work 3/12/2025 sodium 138 potassium 4.2 chloride 103 bicarb 24 BUN 17 creatinine 1.0 GFR 79    Last lipid profile is from 1/15/2025   HDL 43 Calc   Hemoglobin A1c 6.1 on 4/21/2025  TSH 1.4 on 3/20/2023    Chest x-ray 3/10/2025: Unremarkable cardiomediastinal silhouette.  No pulmonary vascular congestion or pleural effusion.    ECG: No results found for this visit on 06/10/25.    CURRENT MEDICATIONS LIST     Current Outpatient Medications:     acetaminophen (TYLENOL) 500 mg tablet, Take 1 tablet (500 mg total) by mouth every 6 (six) hours as needed for moderate pain, Disp: 28  tablet, Rfl: 2    acetaminophen-codeine (TYLENOL with CODEINE #3) 300-30 MG per tablet, Take 1 tablet by mouth every 4 (four) hours as needed for moderate pain or severe pain, Disp: 16 tablet, Rfl: 2    albuterol (PROVENTIL HFA,VENTOLIN HFA) 90 mcg/act inhaler, Inhale 1 puff every 6 (six) hours as needed for wheezing, Disp: 8.5 g, Rfl: 5    aspirin 81 mg chewable tablet, Chew 1 tablet (81 mg total) daily, Disp: 30 tablet, Rfl: 11    atorvastatin (LIPITOR) 80 mg tablet, Take 1 tablet (80 mg total) by mouth daily with dinner, Disp: , Rfl:     fluticasone (FLONASE) 50 mcg/act nasal spray, 1 spray into each nostril daily, Disp: 15.8 mL, Rfl: 5    fluticasone-salmeterol (Advair HFA) 230-21 MCG/ACT inhaler, Inhale 2 puffs 2 (two) times a day Rinse mouth after use., Disp: 24 g, Rfl: 3    furosemide (LASIX) 20 mg tablet, TAKE 1 TABLET (20 MG TOTAL) BY MOUTH EVERY OTHER DAY AS NEEDED (LOWER EXTREMITY EDEMA), Disp: 30 tablet, Rfl: 3    ibuprofen (MOTRIN) 600 mg tablet, Take 1 tablet (600 mg total) by mouth every 6 (six) hours as needed for moderate pain If pain is not controlled, then add on acetaminophen 500 mg to ibuprofen dose., Disp: 28 tablet, Rfl: 2    ibuprofen (MOTRIN) 600 mg tablet, Take 1 tablet (600 mg total) by mouth every 6 (six) hours as needed for mild pain, Disp: 30 tablet, Rfl: 2    isosorbide mononitrate (IMDUR) 30 mg 24 hr tablet, Take 1 tablet (30 mg total) by mouth daily, Disp: 30 tablet, Rfl: 11    losartan (COZAAR) 50 mg tablet, TAKE 1 TABLET (50 MG TOTAL) BY MOUTH DAILY AFTER LUNCH, Disp: 90 tablet, Rfl: 0    metFORMIN (GLUCOPHAGE-XR) 500 mg 24 hr tablet, TAKE 1 TABLET (500 MG TOTAL) BY MOUTH DAILY WITH DINNER, Disp: 30 tablet, Rfl: 0    metoprolol tartrate (LOPRESSOR) 25 mg tablet, Take 2 tablets (50 mg total) by mouth every 12 (twelve) hours, Disp: 120 tablet, Rfl: 11    spironolactone (ALDACTONE) 50 mg tablet, Take 1 tablet (50 mg total) by mouth daily, Disp: , Rfl:     traZODone (DESYREL) 50 mg  "tablet, Take 1 tablet (50 mg total) by mouth daily at bedtime as needed for sleep, Disp: 30 tablet, Rfl: 2    white petrolatum-corn starch-lanolin (TRIPLE PASTE) 12.8 % ointment, Apply topically as needed for irritation, Disp: 57 g, Rfl: 0    Blood Pressure Monitoring ([employer] BP Monitor/Arm) KELLY, Use daily as needed (dizziness), Disp: 1 each, Rfl: 0    prasugrel (EFFIENT) tablet, Take 6 pills 24 hours after last Brilinta dose, then start 10 mg daily the following morning (Patient not taking: Reported on 6/10/2025), Disp: 36 tablet, Rfl: 0       ALLERGIES   No Known Allergies    *-*-*-*-*-*-*-*-*-*-*-*-*-*-*-*-*-*-*-*-*-*-*-*-*-*-*-*-*-*-*-*-*-*-*-*-*-*-*-*-*-*-*-*-*-*-*-*-*-*-*-*-*-*-      Review of Systems   Constitutional:  Positive for fatigue.   HENT: Negative.     Respiratory:  Negative for cough, chest tightness, shortness of breath and wheezing.    Cardiovascular:  Positive for chest pain.   Gastrointestinal: Negative.    Endocrine: Negative.    Genitourinary: Negative.    Musculoskeletal:  Positive for arthralgias, back pain and myalgias.   Skin: Negative.    Neurological: Negative.  Negative for dizziness.   Hematological: Negative.    Psychiatric/Behavioral: Negative.            Objective   /70 (BP Location: Right arm, Patient Position: Sitting, Cuff Size: Adult)   Pulse 84   Ht 5' 6\" (1.676 m)   Wt 88.7 kg (195 lb 9.6 oz)   BMI 31.57 kg/m²      Physical Exam  Constitutional:       General: He is not in acute distress.     Appearance: He is obese. He is not ill-appearing.   HENT:      Head: Atraumatic.      Mouth/Throat:      Mouth: Mucous membranes are moist.   Neck:      Vascular: No carotid bruit.     Cardiovascular:      Rate and Rhythm: Normal rate and regular rhythm.      Heart sounds: Normal heart sounds. No murmur heard.  Pulmonary:      Effort: Pulmonary effort is normal.      Breath sounds: Examination of the right-upper field reveals decreased breath sounds. Examination of the " left-upper field reveals decreased breath sounds. Examination of the right-middle field reveals decreased breath sounds. Examination of the left-middle field reveals decreased breath sounds. Examination of the right-lower field reveals decreased breath sounds. Examination of the left-lower field reveals decreased breath sounds. Decreased breath sounds present. No wheezing, rhonchi or rales.   Abdominal:      Palpations: Abdomen is soft.     Musculoskeletal:      Cervical back: Neck supple.     Skin:     General: Skin is warm and dry.      Capillary Refill: Capillary refill takes more than 3 seconds.     Neurological:      Mental Status: He is oriented to person, place, and time. Mental status is at baseline.     Psychiatric:         Mood and Affect: Mood normal.         Behavior: Behavior normal.

## 2025-06-11 ENCOUNTER — PATIENT OUTREACH (OUTPATIENT)
Dept: FAMILY MEDICINE CLINIC | Facility: CLINIC | Age: 60
End: 2025-06-11

## 2025-06-11 NOTE — PROGRESS NOTES
In Person:  6/11/2025    CMOC met with Pt at [facility] for scheduled appointment. Pt arrived 20 minutes early. Pt provided his state ID, SS card and health insurance card for CMOC to scan and submit with letter to [employer]. Pt applied for [employer] and was approved however required to pay for services since his name on ID does not match name on insurance card. Letter was written as well and submitted with said documents via secured email to [facility] Assistance office. CMOC to follow up.     Pt brought 4 packets from [employer] for his SSI applications. Each packet was in excess of 30 pages long. Pt states no one has helped him to complete them. CMOC informed Pt he was referred by his SW to [employer]. Pt denies receiving a call from them. [facility] provided Pt with [employer] phone number and will forward message to SW. Pt thanked CMOC for her time.     Note routed to CM Team.     Next outreach is scheduled for 6/16/2025.

## 2025-06-13 ENCOUNTER — PATIENT OUTREACH (OUTPATIENT)
Dept: FAMILY MEDICINE CLINIC | Facility: CLINIC | Age: 60
End: 2025-06-13

## 2025-06-13 NOTE — PROGRESS NOTES
OP SW reviewed the care plan for patient. CMOC working on [employer] for transportation. Patient approved for services but has to pay since name on ID does not match insurance. Letter was written and submitted regarding this issue. Next review is scheduled in 30 days to reassess progress and adjust the care plan as needed. OP [facility] routed note to CMOC. OP [facility] will continue to f/u.

## 2025-06-16 ENCOUNTER — PATIENT OUTREACH (OUTPATIENT)
Dept: FAMILY MEDICINE CLINIC | Facility: CLINIC | Age: 60
End: 2025-06-16

## 2025-06-16 NOTE — PROGRESS NOTES
Outgoing Call:  6/16/2025    CMOC completed Promise check to seek status of name update requested with [employer] to receive MATP services. [employer] has updated Pt's name which will make him eligible for MATP. CMOC called [employer] and spoke with Nancy and she informed she will forward application to Melinda for review and update. CMOC to follow up.     Note routed to CM Team.     Next outreach is scheduled for 6/18/2025.

## 2025-06-18 ENCOUNTER — OFFICE VISIT (OUTPATIENT)
Dept: FAMILY MEDICINE CLINIC | Facility: CLINIC | Age: 60
End: 2025-06-18

## 2025-06-18 VITALS
TEMPERATURE: 97.6 F | RESPIRATION RATE: 16 BRPM | HEIGHT: 66 IN | WEIGHT: 201 LBS | SYSTOLIC BLOOD PRESSURE: 110 MMHG | OXYGEN SATURATION: 95 % | DIASTOLIC BLOOD PRESSURE: 70 MMHG | BODY MASS INDEX: 32.3 KG/M2 | HEART RATE: 92 BPM

## 2025-06-18 DIAGNOSIS — N52.9 ERECTILE DYSFUNCTION, UNSPECIFIED ERECTILE DYSFUNCTION TYPE: ICD-10-CM

## 2025-06-18 DIAGNOSIS — B37.0 ORAL THRUSH: Primary | ICD-10-CM

## 2025-06-18 PROCEDURE — 3074F SYST BP LT 130 MM HG: CPT

## 2025-06-18 PROCEDURE — 3078F DIAST BP <80 MM HG: CPT

## 2025-06-18 PROCEDURE — 99213 OFFICE O/P EST LOW 20 MIN: CPT

## 2025-06-18 RX ORDER — NYSTATIN 100000 [USP'U]/ML
500000 SUSPENSION ORAL 4 TIMES DAILY
Qty: 140 ML | Refills: 0 | Status: SHIPPED | OUTPATIENT
Start: 2025-06-18 | End: 2025-06-25

## 2025-06-18 NOTE — PROGRESS NOTES
"Name: Segun Carballo      : 1965      MRN: [id number]  Encounter Provider: MAREN Ramirez  Encounter Date: 2025   Encounter department: [facility]  :  Assessment & Plan  Oral thrush  Exam is notable for white patches on the tongue and buccal mucosa.   Patient reports he has not been rinnsing his mouth out after using Advair.   Counseled on importance of rinsing his mouth out after using the Advair.   Will treat with oral nystatin rinse and swallow.     Orders:    nystatin (MYCOSTATIN) 500,000 units/5 mL suspension; Apply 5 mL (500,000 Units total) to the mouth or throat 4 (four) times a day for 7 days    Erectile dysfunction, unspecified erectile dysfunction type  Patient reports ever since his MI, he has lost interest in sex and is having ED. He is on Imdur which contraindicates use of Viagra or Cialis.   Will refer to Urology for further discussion of treatment options.     Orders:    Ambulatory Referral to Urology; Future           History of Present Illness         Segun Milligan is a a 59 year old male with a history of HTN, HLD, asthma, T2DM, STEMI. He presents to the office today for complaint of tongue pain. He reports it started several days ago and has progressively gotten worse. He is having pain with eating and drinking and speaking.       Review of Systems   Constitutional:  Negative for chills and fever.   HENT:  Positive for mouth sores.    Respiratory:  Negative for cough and shortness of breath.    Cardiovascular:  Negative for chest pain, palpitations and leg swelling.   Gastrointestinal:  Negative for abdominal pain.   Genitourinary:  Negative for penile discharge, penile pain, penile swelling, scrotal swelling and testicular pain.       Objective   /70 (BP Location: Left arm, Patient Position: Sitting, Cuff Size: Standard)   Pulse 92   Temp 97.6 °F (36.4 °C) (Temporal)   Resp 16   Ht 5' 6\" (1.676 m)   Wt 91.2 kg (201 " lb)   SpO2 95%   BMI 32.44 kg/m²      Physical Exam  Vitals and nursing note reviewed.   Constitutional:       General: He is not in acute distress.     Appearance: He is well-developed.   HENT:      Head: Normocephalic and atraumatic.      Mouth/Throat:      Mouth: Mucous membranes are moist.      Tongue: Lesions present.      Comments: white patches on the tongue and buccal mucosa    Eyes:      Conjunctiva/sclera: Conjunctivae normal.       Cardiovascular:      Rate and Rhythm: Normal rate and regular rhythm.      Heart sounds: No murmur heard.  Pulmonary:      Effort: Pulmonary effort is normal. No respiratory distress.      Breath sounds: Normal breath sounds.   Abdominal:      Palpations: Abdomen is soft.      Tenderness: There is no abdominal tenderness.     Musculoskeletal:         General: No swelling.      Cervical back: Neck supple.     Skin:     General: Skin is warm and dry.      Capillary Refill: Capillary refill takes less than 2 seconds.     Neurological:      Mental Status: He is alert.     Psychiatric:         Mood and Affect: Mood normal.

## 2025-06-19 ENCOUNTER — PATIENT OUTREACH (OUTPATIENT)
Dept: FAMILY MEDICINE CLINIC | Facility: CLINIC | Age: 60
End: 2025-06-19

## 2025-06-19 NOTE — PROGRESS NOTES
Outgoing Calls:  6/19/2025    [facility] called [employer] to check on status of Pt's [employer] application. Nancy informed he is now covered under [employer] program. CMOC called Pt and provided this update. Pt states he was aware since [employer] had called him. Pt states he is thankful for [facility]'s assistance and is aware this referral will be closed today.     [employer] updated. Note routed to CM Team.     No further outreach is scheduled.

## 2025-06-20 ENCOUNTER — PATIENT OUTREACH (OUTPATIENT)
Dept: FAMILY MEDICINE CLINIC | Facility: CLINIC | Age: 60
End: 2025-06-20

## 2025-06-20 NOTE — PROGRESS NOTES
OP SW had received an in basket from [facility] yesterday. Per chart, patient approved for [employer] Archuleta. [facility] closed case as goal was completed. OP SW responded to [facility]. OP SW closed case as well. Please reconsult as needed.

## 2025-06-24 DIAGNOSIS — I21.3 STEMI (ST ELEVATION MYOCARDIAL INFARCTION) (HCC): ICD-10-CM

## 2025-06-25 RX ORDER — SPIRONOLACTONE 50 MG/1
50 TABLET, FILM COATED ORAL DAILY
Qty: 90 TABLET | Refills: 0 | Status: SHIPPED | OUTPATIENT
Start: 2025-06-25

## 2025-07-10 ENCOUNTER — TELEPHONE (OUTPATIENT)
Age: 60
End: 2025-07-10

## 2025-07-10 NOTE — TELEPHONE ENCOUNTER
New Patient      Insurance   Current Insurance? [employer]    Insurance E-verified? Y    History   Reason for appointment/active symptoms?  Erectile dysfunction, unspecified erectile dysfunction type     Has the patient had any previous Urologist(s)? Unk     Was the patient seen in the ED? N     Labs/Imaging(Including Out Of Network)? Y     Records Requested? N  Records Visible in EPIC? Y     Personal history of cancer? N     Appointment   Office location preference:  [place]  ?   Appointment Details   Date:  7/30  Time:  3 PM (needs 2:30 PM or later)  Location:  [place]  Provider:  Niurka  Does the appointment need further review? N

## 2025-07-14 ENCOUNTER — TELEPHONE (OUTPATIENT)
Dept: CARDIAC REHAB | Facility: CLINIC | Age: 60
End: 2025-07-14

## 2025-07-17 ENCOUNTER — TELEPHONE (OUTPATIENT)
Dept: CARDIAC REHAB | Facility: CLINIC | Age: 60
End: 2025-07-17

## 2025-07-18 ENCOUNTER — TELEPHONE (OUTPATIENT)
Dept: CARDIAC REHAB | Facility: CLINIC | Age: 60
End: 2025-07-18

## 2025-07-23 ENCOUNTER — TELEPHONE (OUTPATIENT)
Dept: CARDIAC REHAB | Facility: CLINIC | Age: 60
End: 2025-07-23

## 2025-07-29 DIAGNOSIS — G47.00 INSOMNIA, UNSPECIFIED TYPE: ICD-10-CM

## 2025-07-29 RX ORDER — TRAZODONE HYDROCHLORIDE 50 MG/1
50 TABLET ORAL
Qty: 30 TABLET | Refills: 2 | Status: SHIPPED | OUTPATIENT
Start: 2025-07-29

## 2025-07-30 ENCOUNTER — CONSULT (OUTPATIENT)
Dept: UROLOGY | Facility: MEDICAL CENTER | Age: 60
End: 2025-07-30
Payer: COMMERCIAL

## 2025-07-30 VITALS
HEIGHT: 66 IN | HEART RATE: 92 BPM | SYSTOLIC BLOOD PRESSURE: 140 MMHG | BODY MASS INDEX: 32.3 KG/M2 | DIASTOLIC BLOOD PRESSURE: 80 MMHG | OXYGEN SATURATION: 96 % | WEIGHT: 201 LBS

## 2025-07-30 DIAGNOSIS — N52.9 ERECTILE DYSFUNCTION, UNSPECIFIED ERECTILE DYSFUNCTION TYPE: ICD-10-CM

## 2025-07-30 PROCEDURE — 99204 OFFICE O/P NEW MOD 45 MIN: CPT | Performed by: STUDENT IN AN ORGANIZED HEALTH CARE EDUCATION/TRAINING PROGRAM

## 2025-08-01 ENCOUNTER — TELEPHONE (OUTPATIENT)
Dept: FAMILY MEDICINE CLINIC | Facility: CLINIC | Age: 60
End: 2025-08-01

## 2025-08-01 DIAGNOSIS — I21.3 STEMI (ST ELEVATION MYOCARDIAL INFARCTION) (HCC): ICD-10-CM

## 2025-08-04 RX ORDER — METOPROLOL TARTRATE 25 MG/1
50 TABLET, FILM COATED ORAL EVERY 12 HOURS SCHEDULED
Qty: 240 TABLET | Refills: 0 | Status: SHIPPED | OUTPATIENT
Start: 2025-08-04

## 2025-08-13 ENCOUNTER — OFFICE VISIT (OUTPATIENT)
Dept: DENTISTRY | Facility: CLINIC | Age: 60
End: 2025-08-13

## 2025-08-14 ENCOUNTER — TELEPHONE (OUTPATIENT)
Dept: DENTISTRY | Facility: CLINIC | Age: 60
End: 2025-08-14

## 2025-08-21 DIAGNOSIS — J45.20 MILD INTERMITTENT ASTHMA, UNSPECIFIED WHETHER COMPLICATED: ICD-10-CM

## 2025-08-22 RX ORDER — ALBUTEROL SULFATE 90 UG/1
1 INHALANT RESPIRATORY (INHALATION) EVERY 6 HOURS PRN
Qty: 8.5 G | Refills: 5 | Status: SHIPPED | OUTPATIENT
Start: 2025-08-22

## (undated) DEVICE — BALLOON EUPHORA RX 2.5 X 15MM

## (undated) DEVICE — CATH THROMBECTOMY EXPORT 6FR 140CM

## (undated) DEVICE — PINNACLE INTRODUCER SHEATH: Brand: PINNACLE

## (undated) DEVICE — CATH GUIDE LAUNCHER 6FR EBU 3.5

## (undated) DEVICE — DGW .035 FC J3MM 260CM TEF: Brand: EMERALD

## (undated) DEVICE — GUIDEWIRE WHOLEY HI TORQUE INTERM MOD J .035 145CM

## (undated) DEVICE — RUNTHROUGH NS EXTRA FLOPPY PTCA GUIDEWIRE: Brand: RUNTHROUGH

## (undated) DEVICE — BALLOON EUPHORA RX 3 X 15MM

## (undated) DEVICE — DGW .035 FC J3MM 150CM TEF: Brand: EMERALD

## (undated) DEVICE — MICROPUNCTURE INTRODUCER SET SILHOUETTE TRANSITIONLESS PUSH-PLUS DESIGN - STIFFENED CANNULA WITH NITINOL WIRE GUIDE: Brand: MICROPUNCTURE

## (undated) DEVICE — RADIFOCUS OPTITORQUE ANGIOGRAPHIC CATHETER: Brand: OPTITORQUE

## (undated) DEVICE — GLIDESHEATH BASIC HYDROPHILIC COATED INTRODUCER SHEATH: Brand: GLIDESHEATH

## (undated) DEVICE — CATH F6INF TL JR 4 100CM: Brand: INFINITI

## (undated) DEVICE — Device